# Patient Record
Sex: MALE | Race: WHITE | Employment: FULL TIME | URBAN - METROPOLITAN AREA
[De-identification: names, ages, dates, MRNs, and addresses within clinical notes are randomized per-mention and may not be internally consistent; named-entity substitution may affect disease eponyms.]

---
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
## 2017-02-24 RX ORDER — AMLODIPINE BESYLATE 10 MG/1
TABLET ORAL
Qty: 90 TAB | Refills: 3 | Status: SHIPPED | OUTPATIENT
Start: 2017-02-24 | End: 2018-03-04 | Stop reason: SDUPTHER

## 2017-04-07 RX ORDER — PEN NEEDLE, DIABETIC 31 GX5/16"
NEEDLE, DISPOSABLE MISCELLANEOUS
Qty: 200 PEN NEEDLE | Refills: 11 | Status: SHIPPED | OUTPATIENT
Start: 2017-04-07 | End: 2018-10-30 | Stop reason: ALTCHOICE

## 2017-04-17 ENCOUNTER — LAB ONLY (OUTPATIENT)
Dept: ENDOCRINOLOGY | Age: 47
End: 2017-04-17

## 2017-04-17 DIAGNOSIS — E55.9 VITAMIN D DEFICIENCY: ICD-10-CM

## 2017-04-17 DIAGNOSIS — E10.65 UNCONTROLLED TYPE 1 DIABETES MELLITUS WITH HYPERGLYCEMIA (HCC): Primary | ICD-10-CM

## 2017-04-17 DIAGNOSIS — E78.5 HYPERLIPIDEMIA LDL GOAL <70: ICD-10-CM

## 2017-04-18 LAB
25(OH)D3+25(OH)D2 SERPL-MCNC: 39.2 NG/ML (ref 30–100)
ALBUMIN SERPL-MCNC: 4.4 G/DL (ref 3.5–5.5)
ALBUMIN/GLOB SERPL: 1.6 {RATIO} (ref 1.2–2.2)
ALP SERPL-CCNC: 94 IU/L (ref 39–117)
ALT SERPL-CCNC: 14 IU/L (ref 0–44)
AST SERPL-CCNC: 18 IU/L (ref 0–40)
BILIRUB SERPL-MCNC: <0.2 MG/DL (ref 0–1.2)
BUN SERPL-MCNC: 27 MG/DL (ref 6–24)
BUN/CREAT SERPL: 25 (ref 9–20)
CALCIUM SERPL-MCNC: 9.1 MG/DL (ref 8.7–10.2)
CHLORIDE SERPL-SCNC: 98 MMOL/L (ref 96–106)
CHOLEST SERPL-MCNC: 204 MG/DL (ref 100–199)
CO2 SERPL-SCNC: 19 MMOL/L (ref 18–29)
CREAT SERPL-MCNC: 1.08 MG/DL (ref 0.76–1.27)
EST. AVERAGE GLUCOSE BLD GHB EST-MCNC: 192 MG/DL
GLOBULIN SER CALC-MCNC: 2.7 G/DL (ref 1.5–4.5)
GLUCOSE SERPL-MCNC: 204 MG/DL (ref 65–99)
HBA1C MFR BLD: 8.3 % (ref 4.8–5.6)
HDLC SERPL-MCNC: 36 MG/DL
INTERPRETATION, 910389: NORMAL
LDLC SERPL CALC-MCNC: 117 MG/DL (ref 0–99)
POTASSIUM SERPL-SCNC: 4.5 MMOL/L (ref 3.5–5.2)
PROT SERPL-MCNC: 7.1 G/DL (ref 6–8.5)
SODIUM SERPL-SCNC: 136 MMOL/L (ref 134–144)
TRIGL SERPL-MCNC: 253 MG/DL (ref 0–149)
VLDLC SERPL CALC-MCNC: 51 MG/DL (ref 5–40)

## 2017-04-21 ENCOUNTER — OFFICE VISIT (OUTPATIENT)
Dept: ENDOCRINOLOGY | Age: 47
End: 2017-04-21

## 2017-04-21 VITALS
DIASTOLIC BLOOD PRESSURE: 72 MMHG | HEART RATE: 96 BPM | HEIGHT: 64 IN | SYSTOLIC BLOOD PRESSURE: 142 MMHG | BODY MASS INDEX: 36.4 KG/M2 | WEIGHT: 213.2 LBS

## 2017-04-21 DIAGNOSIS — I10 ESSENTIAL HYPERTENSION, BENIGN: ICD-10-CM

## 2017-04-21 DIAGNOSIS — E78.5 HYPERLIPIDEMIA LDL GOAL <70: ICD-10-CM

## 2017-04-21 DIAGNOSIS — E10.65 UNCONTROLLED TYPE 1 DIABETES MELLITUS WITH HYPERGLYCEMIA (HCC): Primary | ICD-10-CM

## 2017-04-21 DIAGNOSIS — E55.9 VITAMIN D DEFICIENCY: ICD-10-CM

## 2017-04-21 NOTE — PROGRESS NOTES
Chief Complaint   Patient presents with    Diabetes     pcp and pharmacy confirmed     History of Present Illness: Corrinne Corning is a 48 y.o. male here for follow up of diabetes. Weight up 7 lbs since last visit in 11/16. After last visit, split the levemir to 40 units bid and after about a week went up to 45 bid and has stayed at this dose. Has stayed on the 9:30-6 shift so has been been able to eat dinner earlier. Checking sugars fasting and seeing 175-225 and before dinner is 105-120. Compliant with BP and lipid regimen. Has not been doing any walking and willing to start. Wife is now 2 years out from liver transplant and doing well. Current Outpatient Prescriptions   Medication Sig    insulin detemir (LEVEMIR FLEXTOUCH) 100 unit/mL (3 mL) inpn Inject 45 units twice daily. Increase as directed to max dose of 100 units per day--replaces lantus--Dose change 4/20/17--updated med list--did not send prescription to the pharmacy    BD INSULIN PEN NEEDLE UF MINI 31 gauge x 3/16\" ndle AS DIRECTED UP TO 5 TIMES DAILY FOR BOTH HUMALOG AND LANTUS    amLODIPine (NORVASC) 10 mg tablet TAKE 1 TABLET BY MOUTH DAILY    atorvastatin (LIPITOR) 40 mg tablet TAKE ONE (1) TABLET(S) DAILY    insulin lispro (HUMALOG KWIKPEN) 200 unit/mL (3 mL) inpn 20 Units by SubCUTAneous route three (3) times daily. + 4 units for every 50 mg/dl above 150 mg/dl. Max 100 units per day    hydrochlorothiazide (HYDRODIURIL) 25 mg tablet TAKE 1 TABLET BY MOUTH ONCE DAILY    lisinopril (PRINIVIL, ZESTRIL) 40 mg tablet TAKE 1 TABLET BY MOUTH ONCE DAILY    insulin syringe-needle U-100 1 mL 31 x 15/64\" syrg 1 Syringe by SubCUTAneous route five (5) times daily.  aspirin 81 mg chewable tablet Take 81 mg by mouth daily.  ONETOUCH ULTRA TEST strip Test up to 6 times daily. Dx: 250.03    ONE TOUCH DELICA 33 gauge misc Use as directed up to 6 times daily.   Dx: 250.03    cholecalciferol, vitamin D3, (VITAMIN D3) 2,000 unit Tab Take  by mouth daily.  MV-MN/FA/D3/LYCOPENE/LUT/COQ10 (DAILY MULTIVITAMIN PO) Take  by mouth.  cyanocobalamin (VITAMIN B-12) 500 mcg tablet Take 1,000 mcg by mouth daily. No current facility-administered medications for this visit. No Known Allergies     Review of Systems:  - Eyes: no blurry vision or double vision  - Cardiovascular: no chest pain  - Respiratory: no shortness of breath  - Musculoskeletal: no myalgias  - Neurological: no numbness/tingling in extremities    Physical Examination:  Blood pressure 143/72, pulse 96, height 5' 4\" (1.626 m), weight 213 lb 3.2 oz (96.7 kg). Repeat manually 142/72 in right arm.  - General: pleasant, no distress, good eye contact   - Neck: no carotid bruits  - Cardiovascular: regular, normal rate, nl s1 and s2, no m/r/g,   - Respiratory: clear bilaterally  - Integumentary: no edema,   - Psychiatric: normal mood and affect    Data Reviewed:   Component      Latest Ref Rng & Units 4/17/2017 4/17/2017 4/17/2017 4/17/2017          10:36 AM 10:36 AM 10:36 AM 10:36 AM   Glucose      65 - 99 mg/dL   204 (H)    BUN      6 - 24 mg/dL   27 (H)    Creatinine      0.76 - 1.27 mg/dL   1.08    GFR est non-AA      >59 mL/min/1.73   81    GFR est AA      >59 mL/min/1.73   93    BUN/Creatinine ratio      9 - 20   25 (H)    Sodium      134 - 144 mmol/L   136    Potassium      3.5 - 5.2 mmol/L   4.5    Chloride      96 - 106 mmol/L   98    CO2      18 - 29 mmol/L   19    Calcium      8.7 - 10.2 mg/dL   9.1    Protein, total      6.0 - 8.5 g/dL   7.1    Albumin      3.5 - 5.5 g/dL   4.4    GLOBULIN, TOTAL      1.5 - 4.5 g/dL   2.7    A-G Ratio      1.2 - 2.2   1.6    Bilirubin, total      0.0 - 1.2 mg/dL   <0.2    Alk.  phosphatase      39 - 117 IU/L   94    AST      0 - 40 IU/L   18    ALT      0 - 44 IU/L   14    Cholesterol, total      100 - 199 mg/dL  204 (H)     Triglyceride      0 - 149 mg/dL  253 (H)     HDL Cholesterol      >39 mg/dL  36 (L)     VLDL, calculated      5 - 40 mg/dL 51 (H)     LDL, calculated      0 - 99 mg/dL  117 (H)     Hemoglobin A1c, (calculated)      4.8 - 5.6 %    8.3 (H)   Estimated average glucose      mg/dL    192   VITAMIN D, 25-HYDROXY      30.0 - 100.0 ng/mL 39.2          Assessment/Plan:     1. DM w/o complication type I, uncontrolled (250.03) his most recent Hgb A1c was 8.3% in 4/17 down from 8.8% in 11/16 up from 8.3% in 6/16 down from 8.6% in 2/16 up from 8.3% in 9/15 down from 9.4% in 4/15 up from 9.3% in 1/14 down from 10.4% in 9/13 up from 10.1% in 5/13 up from 8.1% in 1/13 down from 8.7% in Oct up from 8.3% in May up from 7.7% in February 2012 up from 6.8% in November 2011 down from 9% in September. Overall control is improving with splitting the levemir and will try going up on night dose to get fasting sugars under 130.  - decrease levemir to 40 units before breakfast and increase night dose to 50 units  - cont humalog 20 units before each meal + 4 units for every 50 mg/dl above 150 mg/dl   - check bs up to 6 times per day due to fluctuating blood sugars  - foot exam done 6/16  - optho UTD 11/16  - microalbumin nl 2/12 and 5/12, up to 37 in 5/13, down to 23 in 6/16--repeat today  - check A1c and cmp prior to next visit     2. HTN (hypertension) (401. 9AF) his BP was just goal < 140/90. Will focus on weight loss. - cont norvasc 10 mg daily  - cont lisinopril 40 mg daily  - cont hctz 25 mg daily     3. Other and unspecified hyperlipidemia (272.4) Given DM and TIA, Goal LDL < 70, non-HDL < 100, and TG < 150. LDL 94 in November 2011 and 97 in February 2012. Up to 106 in May despite switching to lipitor but back to 97 in Oct 2012 and 65 in 1/13 but up to 131 in 5/13 due to diet. Had been off lipitor for 12 days in 9/13 and LDL was unable to be calculated due to TG of 565 and non-HDL of 228.  in 1/14. Couldn't be calculated in 4/15 due to high TG of 874 while off lipitor.  and  in 9/15.    and  in 2/16 and 175 and 231 and LDL 96 in 6/16.  and  in 11/16 due to being off lipitor for 1 week but down to TC of 204 and TG of 253 in 4/17.  - cont lipitor 40 mg daily  - check lipids prior to next visit     4. Vitamin D deficiency (268.9) His level was 17.2 in February and up to 29.9 in May and 39.4 in Oct 2012 and 45 in 1/13 and 36 in 5/13. Down to 25.6 in 9/13 but had been off vitamin D and up to 31.9 in 1/14. Currently off vitamin D and level 14.7 in 4/15 and 19.7 in 9/15. Back on since then and 43 in 2/16 and 41 in 6/16 and 39 in 4/17. Goal > 30.       - cont vitamin D 2000 units daily       - check Vitamin D 25-OH level in 4/18      Patient Instructions   1) Take 50 units of levemir at night and 40 units in the morning. Give this one week and if still having fasting sugars over 130, try 55 units at night and stay at 40 in the morning. My goal is to get your fasting sugars under 130 to get your A1c  to 7% or less. 2) Your cholesterol is 100 points lower. 3) Your BUN was just slightly high likely due to mild dehydration but your liver function was normal and vitamin D was normal.    4) Send me the name of your eye doctor and we'll track down your report. 5) Try to get back into walking 10-15 minutes a day 2-3 times a week and work up to 30 minutes 5 times a week. This does not have to be done altogether but can be split up throughout the day. 6) I will send you a reminder letter 3-4 weeks prior to your next visit and you will have the order already in the [facility] system so you can just go sometime in the 3-7 days before the next visit to have your labs drawn. Follow-up Disposition:  Return in about 5 months (around 9/21/2017). Copy sent to:  Dr. Surjit Brown [phone]    Lab follow up: 4/23/17  Component      Latest Ref Rng & Units 4/21/2017           4:37 PM   Creatinine, urine      Not Estab. mg/dL 102.5   Microalbumin, urine      Not Estab. ug/mL 48.3   Microalbumin/Creat.  Ratio 0.0 - 30.0 mg/g creat 47.1 (H)     Sent him the following message through backstitch:  Microalbumin/creatinine ratio is a marker of the amount of protein in your urine. Goal is less than 30. Your value is slightly abnormal. This indicates that your kidneys are being slightly affected by your uncontrolled diabetes and/or blood pressure. Hopefully with improving your A1c, we'll prevent your kidneys from getting any worse. Continue to take lisinopril to help protect your kidneys from the effects of diabetes and high blood pressure.

## 2017-04-21 NOTE — PATIENT INSTRUCTIONS
1) Take 50 units of levemir at night and 40 units in the morning. Give this one week and if still having fasting sugars over 130, try 55 units at night and stay at 40 in the morning. My goal is to get your fasting sugars under 130 to get your A1c  to 7% or less. 2) Your cholesterol is 100 points lower. 3) Your BUN was just slightly high likely due to mild dehydration but your liver function was normal and vitamin D was normal.    4) Send me the name of your eye doctor and we'll track down your report. 5) Try to get back into walking 10-15 minutes a day 2-3 times a week and work up to 30 minutes 5 times a week. This does not have to be done altogether but can be split up throughout the day. 6) I will send you a reminder letter 3-4 weeks prior to your next visit and you will have the order already in the [email or web address] system so you can just go sometime in the 3-7 days before the next visit to have your labs drawn.

## 2017-04-21 NOTE — MR AVS SNAPSHOT
Visit Information Date & Time Provider Department Dept. Phone Encounter #  
 4/21/2017  3:50 PM Chandra Cameron, [address] [place] [address] Diabetes and Endocrinology [phone] [id number] Follow-up Instructions Return in about 5 months (around 9/21/2017). Upcoming Health Maintenance Date Due Pneumococcal 19-64 Medium Risk (1 of 1 - PPSV23) 12/14/1987 DTaP/Tdap/Td series (1 - Tdap) 12/14/1989 EYE EXAM RETINAL OR DILATED Q1 2/1/2016 INFLUENZA AGE 9 TO ADULT 8/1/2016 MICROALBUMIN Q1 6/20/2017 FOOT EXAM Q1 6/24/2017 HEMOGLOBIN A1C Q6M 10/17/2017 LIPID PANEL Q1 4/17/2018 Allergies as of 4/21/2017  Review Complete On: 4/21/2017 By: Chandra Cameron MD  
 No Known Allergies Current Immunizations  Reviewed on 9/6/2011 No immunizations on file. Not reviewed this visit Vitals BP Pulse Height(growth percentile) Weight(growth percentile) BMI Smoking Status 143/72 (BP 1 Location: Left arm, BP Patient Position: Sitting) 96 5' 4\" (1.626 m) 213 lb 3.2 oz (96.7 kg) 36.6 kg/m2 Never Smoker Vitals History BMI and BSA Data Body Mass Index Body Surface Area  
 36.6 kg/m 2 2.09 m 2 Preferred Pharmacy Pharmacy Name Phone [facility], [address] [place] [address] AT [address] [place] [address] [phone] Your Updated Medication List  
  
   
This list is accurate as of: 4/21/17  4:14 PM.  Always use your most recent med list. amLODIPine 10 mg tablet Commonly known as:  Brigida Rafter TAKE 1 TABLET BY MOUTH DAILY  
  
 aspirin 81 mg chewable tablet Take 81 mg by mouth daily. atorvastatin 40 mg tablet Commonly known as:  LIPITOR  
TAKE ONE (1) TABLET(S) DAILY  
  
 BD INSULIN PEN NEEDLE UF MINI 31 gauge x 3/16\" Ndle Generic drug:  Insulin Needles (Disposable) AS DIRECTED UP TO 5 TIMES DAILY FOR BOTH HUMALOG AND LANTUS  
  
 DAILY MULTIVITAMIN PO Take  by mouth. hydroCHLOROthiazide 25 mg tablet Commonly known as:  HYDRODIURIL  
TAKE 1 TABLET BY MOUTH ONCE DAILY  
  
 insulin detemir 100 unit/mL (3 mL) Inpn Commonly known as:  Caron Severs Inject 40 units in the morning and 50 units at night. Increase as directed to max dose of 100 units per day--replaces lantus--Dose change 4/20/17--updated med list--did not send prescription to the pharmacy  
  
 insulin lispro 200 unit/mL (3 mL) Inpn Commonly known as:  HumaLOG KwikPen 20 Units by SubCUTAneous route three (3) times daily. + 4 units for every 50 mg/dl above 150 mg/dl. Max 100 units per day  
  
 insulin syringe-needle U-100 1 mL 31 gauge x 15/64\" Syrg 1 Syringe by SubCUTAneous route five (5) times daily. lisinopril 40 mg tablet Commonly known as:  PRINIVIL, ZESTRIL  
TAKE 1 TABLET BY MOUTH ONCE DAILY One Touch Delica 33 gauge Misc Generic drug:  lancets Use as directed up to 6 times daily. Dx: 250.03  
  
 ONETOUCH ULTRA TEST strip Generic drug:  glucose blood VI test strips Test up to 6 times daily. Dx: 250.03  
  
 VITAMIN B-12 500 mcg tablet Generic drug:  cyanocobalamin Take 1,000 mcg by mouth daily. VITAMIN D3 2,000 unit Tab Generic drug:  cholecalciferol (vitamin D3) Take  by mouth daily. Follow-up Instructions Return in about 5 months (around 9/21/2017). Patient Instructions 1) Take 50 units of levemir at night and 40 units in the morning. Give this one week and if still having fasting sugars over 130, try 55 units at night and stay at 40 in the morning. My goal is to get your fasting sugars under 130 to get your A1c  to 7% or less. 2) Your cholesterol is 100 points lower. 3) Your BUN was just slightly high likely due to mild dehydration but your liver function was normal and vitamin D was normal. 
 
4) Send me the name of your eye doctor and we'll track down your report. 5) Try to get back into walking 10-15 minutes a day 2-3 times a week and work up to 30 minutes 5 times a week. This does not have to be done altogether but can be split up throughout the day. 6) I will send you a reminder letter 3-4 weeks prior to your next visit and you will have the order already in the [facility] system so you can just go sometime in the 3-7 days before the next visit to have your labs drawn. Introducing [facility]! Dear Melanie Worthington: 
Thank you for requesting a [employer] account. Our records indicate that you already have an active In[employer] account. You can access your account anytime at [email or web address] Did you know that you can access your hospital and ER discharge instructions at any time in [employer]? You can also review all of your test results from your hospital stay or ER visit. Additional Information If you have questions, please visit the Frequently Asked Questions section of the Innot[employer] website at [email or web address] Remember, In[employer] is NOT to be used for urgent needs. For medical emergencies, dial 911. Now available from your iPhone and Android! Please provide this summary of care documentation to your next provider. Your primary care clinician is listed as Timothy Aleman. If you have any questions after today's visit, please call [phone].

## 2017-04-22 LAB
ALBUMIN/CREAT UR: 47.1 MG/G CREAT (ref 0–30)
CREAT UR-MCNC: 102.5 MG/DL
MICROALBUMIN UR-MCNC: 48.3 UG/ML

## 2017-06-02 RX ORDER — HYDROCHLOROTHIAZIDE 25 MG/1
TABLET ORAL
Qty: 90 TAB | Refills: 3 | Status: SHIPPED | OUTPATIENT
Start: 2017-06-02 | End: 2018-03-08 | Stop reason: SDUPTHER

## 2017-06-02 RX ORDER — LISINOPRIL 40 MG/1
TABLET ORAL
Qty: 90 TAB | Refills: 3 | Status: SHIPPED | OUTPATIENT
Start: 2017-06-02 | End: 2018-03-08 | Stop reason: SDUPTHER

## 2017-08-30 DIAGNOSIS — E10.65 UNCONTROLLED TYPE 1 DIABETES MELLITUS WITH HYPERGLYCEMIA (HCC): ICD-10-CM

## 2017-08-30 DIAGNOSIS — E55.9 VITAMIN D DEFICIENCY: ICD-10-CM

## 2017-08-30 DIAGNOSIS — E78.5 HYPERLIPIDEMIA LDL GOAL <70: ICD-10-CM

## 2017-08-30 DIAGNOSIS — I10 ESSENTIAL HYPERTENSION, BENIGN: ICD-10-CM

## 2017-09-12 LAB
ALBUMIN SERPL-MCNC: 4.8 G/DL (ref 3.5–5.5)
ALBUMIN/GLOB SERPL: 1.9 {RATIO} (ref 1.2–2.2)
ALP SERPL-CCNC: 102 IU/L (ref 39–117)
ALT SERPL-CCNC: 24 IU/L (ref 0–44)
AST SERPL-CCNC: 25 IU/L (ref 0–40)
BILIRUB SERPL-MCNC: 0.5 MG/DL (ref 0–1.2)
BUN SERPL-MCNC: 21 MG/DL (ref 6–24)
BUN/CREAT SERPL: 18 (ref 9–20)
CALCIUM SERPL-MCNC: 9.6 MG/DL (ref 8.7–10.2)
CHLORIDE SERPL-SCNC: 99 MMOL/L (ref 96–106)
CHOLEST SERPL-MCNC: 188 MG/DL (ref 100–199)
CO2 SERPL-SCNC: 24 MMOL/L (ref 18–29)
CREAT SERPL-MCNC: 1.16 MG/DL (ref 0.76–1.27)
EST. AVERAGE GLUCOSE BLD GHB EST-MCNC: 166 MG/DL
GLOBULIN SER CALC-MCNC: 2.5 G/DL (ref 1.5–4.5)
GLUCOSE SERPL-MCNC: 124 MG/DL (ref 65–99)
HBA1C MFR BLD: 7.4 % (ref 4.8–5.6)
HDLC SERPL-MCNC: 37 MG/DL
LDLC SERPL CALC-MCNC: 112 MG/DL (ref 0–99)
POTASSIUM SERPL-SCNC: 5.4 MMOL/L (ref 3.5–5.2)
PROT SERPL-MCNC: 7.3 G/DL (ref 6–8.5)
SODIUM SERPL-SCNC: 138 MMOL/L (ref 134–144)
TRIGL SERPL-MCNC: 195 MG/DL (ref 0–149)
VLDLC SERPL CALC-MCNC: 39 MG/DL (ref 5–40)

## 2017-09-25 ENCOUNTER — OFFICE VISIT (OUTPATIENT)
Dept: ENDOCRINOLOGY | Age: 47
End: 2017-09-25

## 2017-09-25 VITALS
HEART RATE: 91 BPM | SYSTOLIC BLOOD PRESSURE: 156 MMHG | BODY MASS INDEX: 36.33 KG/M2 | HEIGHT: 64 IN | WEIGHT: 212.8 LBS | DIASTOLIC BLOOD PRESSURE: 78 MMHG

## 2017-09-25 DIAGNOSIS — E78.5 HYPERLIPIDEMIA LDL GOAL <70: ICD-10-CM

## 2017-09-25 DIAGNOSIS — I10 ESSENTIAL HYPERTENSION, BENIGN: ICD-10-CM

## 2017-09-25 DIAGNOSIS — E10.65 UNCONTROLLED TYPE 1 DIABETES MELLITUS WITH HYPERGLYCEMIA (HCC): Primary | ICD-10-CM

## 2017-09-25 DIAGNOSIS — E55.9 VITAMIN D DEFICIENCY: ICD-10-CM

## 2017-09-25 NOTE — MR AVS SNAPSHOT
Visit Information Date & Time Provider Department Dept. Phone Encounter #  
 9/25/2017  4:10 PM Tamika Brown, [address] [facility] Diabetes and Endocrinology [phone] [id number] Follow-up Instructions Return in about 6 months (around 3/25/2018). Upcoming Health Maintenance Date Due Pneumococcal 19-64 Medium Risk (1 of 1 - PPSV23) 12/14/1987 DTaP/Tdap/Td series (1 - Tdap) 12/14/1989 EYE EXAM RETINAL OR DILATED Q1 2/1/2016 FOOT EXAM Q1 6/24/2017 INFLUENZA AGE 9 TO ADULT 8/1/2017 HEMOGLOBIN A1C Q6M 3/11/2018 MICROALBUMIN Q1 4/21/2018 LIPID PANEL Q1 9/11/2018 Allergies as of 9/25/2017  Review Complete On: 9/25/2017 By: Tamika Brown MD  
 No Known Allergies Current Immunizations  Reviewed on 9/6/2011 No immunizations on file. Not reviewed this visit You Were Diagnosed With   
  
 Codes Comments Uncontrolled type 1 diabetes mellitus with hyperglycemia (HCC)    -  Primary ICD-10-CM: E10.65 ICD-9-CM: 250.83, 790.29 Essential hypertension, benign     ICD-10-CM: I10 
ICD-9-CM: 401.1 Hyperlipidemia LDL goal <70     ICD-10-CM: E78.5 ICD-9-CM: 272.4 Vitamin D deficiency     ICD-10-CM: E55.9 ICD-9-CM: 268.9 Vitals BP Pulse Height(growth percentile) Weight(growth percentile) BMI Smoking Status 156/78 91 5' 4\" (1.626 m) 212 lb 12.8 oz (96.5 kg) 36.53 kg/m2 Never Smoker Vitals History BMI and BSA Data Body Mass Index Body Surface Area  
 36.53 kg/m 2 2.09 m 2 Preferred Pharmacy Pharmacy Name Phone [facility], [address] [facility] [address] [phone] Your Updated Medication List  
  
   
This list is accurate as of: 9/25/17  4:59 PM.  Always use your most recent med list. amLODIPine 10 mg tablet Commonly known as:  Jasmin Grebe TAKE 1 TABLET BY MOUTH DAILY  
  
 aspirin 81 mg chewable tablet Take 81 mg by mouth daily. atorvastatin 40 mg tablet Commonly known as:  LIPITOR  
TAKE ONE (1) TABLET(S) DAILY  
  
 BD INSULIN PEN NEEDLE UF MINI 31 gauge x 3/16\" Ndle Generic drug:  Insulin Needles (Disposable) AS DIRECTED UP TO 5 TIMES DAILY FOR BOTH HUMALOG AND LANTUS  
  
 DAILY MULTIVITAMIN PO Take  by mouth. hydroCHLOROthiazide 25 mg tablet Commonly known as:  HYDRODIURIL  
TAKE 1 TABLET BY MOUTH EVERY DAY  
  
 insulin detemir 100 unit/mL (3 mL) Inpn Commonly known as:  Sergo Clark Inject 46 units in the morning and 50 units at night. Increase as directed to max dose of 100 units per day--Dose change 9/25/17--updated med list--did not send prescription to the pharmacy  
  
 insulin lispro 200 unit/mL (3 mL) Inpn Commonly known as:  HumaLOG KwikPen 10 Units by SubCUTAneous route three (3) times daily. + 2 units for every 50 mg/dl above 150 mg/dl. Max 100 units per day--Dose change 9/25/17--updated med list--did not send prescription to the pharmacy  
  
 insulin syringe-needle U-100 1 mL 31 gauge x 15/64\" Syrg 1 Syringe by SubCUTAneous route five (5) times daily. lisinopril 40 mg tablet Commonly known as:  PRINIVIL, ZESTRIL  
TAKE 1 TABLET BY MOUTH EVERY DAY One Touch Delica 33 gauge Misc Generic drug:  lancets Use as directed up to 6 times daily. Dx: 250.03  
  
 ONETOUCH ULTRA TEST strip Generic drug:  glucose blood VI test strips Test up to 6 times daily. Dx: 250.03  
  
 VITAMIN B-12 500 mcg tablet Generic drug:  cyanocobalamin Take 1,000 mcg by mouth daily. VITAMIN D3 2,000 unit Tab Generic drug:  cholecalciferol (vitamin D3) Take  by mouth daily. Follow-up Instructions Return in about 6 months (around 3/25/2018). To-Do List   
 02/25/2018 Lab:  HEMOGLOBIN A1C WITH EAG   
  
 02/25/2018 Lab:  LIPID PANEL   
  
 02/25/2018 Lab:  METABOLIC PANEL, COMPREHENSIVE   
  
 02/25/2018 Lab: MICROALBUMIN, UR, RAND W/ MICROALBUMIN/CREA RATIO   
  
 02/25/2018 Lab:  VITAMIN D, 25 HYDROXY Patient Instructions 1) Dose your humalog as listed below: 
Blood sugar Breakfast/Lunch/Dinner Bedtime (or when not eating) Less than 90 Eat first, take 8    ---- 
   10       ---- 
151-200  12        Take 2 only 201-250  14       Take 4 only 251-300  16       Take 6 only 301-350  18       Take 8 only 351-400  20       Take 10 only 401-450  22       Take 12 only 2) Decrease your levemir to 46 units in the morning and 50 units at night. If you are still having low sugars under 80 during the day when you are not eating, then further decrease the morning dose by 4 units as needed. 3) Start monitoring blood pressure about 2-3 times per week at alternating times either in the morning or evening after resting for 5 minutes and sitting upright in a chair with your arm at heart level. Please let me know if you are having readings over 140 on the top number or 90 on the bottom number. 4) Try to shoot for 30 grams of carbs or less per meal.  If you eat a larger meal with 45-60 grams like going out to eat, then plan on using the old scale for humalog. 5) Your cholesterol has come down with better diabetes control. Introducing [facility] & [facility]! Dear Marty Ortiz: 
Thank you for requesting a [employer] account. Our records indicate that you already have an active [employer] account. You can access your account anytime at [email or web address] Did you know that you can access your hospital and ER discharge instructions at any time in [employer]? You can also review all of your test results from your hospital stay or ER visit. Additional Information If you have questions, please visit the Frequently Asked Questions section of the [employer] website at [email or web address] Remember, [employer] is NOT to be used for urgent needs.  For medical emergencies, dial 911. Now available from your iPhone and Android! Please provide this summary of care documentation to your next provider. Your primary care clinician is listed as Timothy Aleman. If you have any questions after today's visit, please call [phone].

## 2017-09-25 NOTE — PROGRESS NOTES
Chief Complaint   Patient presents with    Diabetes     pcp and pharmacy confirmed    Diabetic Foot Exam     due    Other     consent form signed for eye report     History of Present Illness: David Curry is a 48 y.o. male here for follow up of diabetes. Weight down 1 lbs since last visit in 4/17. Has increased the morning dose of levemir up to 50 units and stayed at 50 at night and this helped eliminate the afternoon higher sugars. About 3 weeks ago, started cutting back on carbs to eat more protein and veggies but will still have fruit as his carb and this had allowed his fasting sugars to improve to  and can be  during the day. Has had some readings under 70 with not eating as many carbs but still following the same scale for the humalog. Compliant with BP and lipid regimen. Hasn't checked his BP at home but is willing to start doing so. Has had more arthritis pain in his hands recently. Current Outpatient Prescriptions   Medication Sig    insulin detemir (LEVEMIR FLEXTOUCH) 100 unit/mL (3 mL) inpn Inject 50 units in the morning and 50 units at night. Increase as directed to max dose of 100 units per day--Dose change 9/25/17--updated med list--did not send prescription to the pharmacy    insulin lispro (HUMALOG KWIKPEN) 200 unit/mL (3 mL) inpn 20 Units by SubCUTAneous route three (3) times daily. + 4 units for every 50 mg/dl above 150 mg/dl. Max 100 units per day    hydroCHLOROthiazide (HYDRODIURIL) 25 mg tablet TAKE 1 TABLET BY MOUTH EVERY DAY    lisinopril (PRINIVIL, ZESTRIL) 40 mg tablet TAKE 1 TABLET BY MOUTH EVERY DAY    BD INSULIN PEN NEEDLE UF MINI 31 gauge x 3/16\" ndle AS DIRECTED UP TO 5 TIMES DAILY FOR BOTH HUMALOG AND LANTUS    amLODIPine (NORVASC) 10 mg tablet TAKE 1 TABLET BY MOUTH DAILY    atorvastatin (LIPITOR) 40 mg tablet TAKE ONE (1) TABLET(S) DAILY    insulin syringe-needle U-100 1 mL 31 x 15/64\" syrg 1 Syringe by SubCUTAneous route five (5) times daily.     aspirin 81 mg chewable tablet Take 81 mg by mouth daily.  ONETOUCH ULTRA TEST strip Test up to 6 times daily. Dx: 250.03    ONE TOUCH DELICA 33 gauge misc Use as directed up to 6 times daily. Dx: 250.03    cholecalciferol, vitamin D3, (VITAMIN D3) 2,000 unit Tab Take  by mouth daily.  MV-MN/FA/D3/LYCOPENE/LUT/COQ10 (DAILY MULTIVITAMIN PO) Take  by mouth.  cyanocobalamin (VITAMIN B-12) 500 mcg tablet Take 1,000 mcg by mouth daily. No current facility-administered medications for this visit. No Known Allergies     Review of Systems:  - Eyes: no blurry vision or double vision  - Cardiovascular: no chest pain  - Respiratory: no shortness of breath  - Musculoskeletal: no myalgias  - Neurological: no numbness/tingling in extremities    Physical Examination:  Blood pressure 156/78, pulse 91, height 5' 4\" (1.626 m), weight 212 lb 12.8 oz (96.5 kg).   - General: pleasant, no distress, good eye contact   - Neck: no carotid bruits  - Cardiovascular: regular, normal rate, nl s1 and s2, no m/r/g,   - Respiratory: clear bilaterally  - Integumentary: no edema,   - Psychiatric: normal mood and affect         Diabetic foot exam performed by Karuna Freed MD     Measurement  Response Nurse Comment Physician Comment   Monofilament  R - reduced sensation with micro filament  L - reduced sensation with micro filament     Pulse DP R - 2+ (normal)  L - 2+ (normal)     Pulse TP R -  slightly decreased    L -  slightly decreased       Structural deformity R - None  L - None     Skin Integrity / Deformity R - Mild - callus  L - Mild - callus        Reviewed by:                 Data Reviewed: Component      Latest Ref Rng & Units 9/11/2017 9/11/2017 9/11/2017           8:55 AM  8:55 AM  8:55 AM   Glucose      65 - 99 mg/dL  124 (H)    BUN      6 - 24 mg/dL  21    Creatinine      0.76 - 1.27 mg/dL  1.16    GFR est non-AA      >59 mL/min/1.73  74    GFR est AA      >59 mL/min/1.73  86    BUN/Creatinine ratio      9 - 20  18    Sodium      134 - 144 mmol/L  138    Potassium      3.5 - 5.2 mmol/L  5.4 (H)    Chloride      96 - 106 mmol/L  99    CO2      18 - 29 mmol/L  24    Calcium      8.7 - 10.2 mg/dL  9.6    Protein, total      6.0 - 8.5 g/dL  7.3    Albumin      3.5 - 5.5 g/dL  4.8    GLOBULIN, TOTAL      1.5 - 4.5 g/dL  2.5    A-G Ratio      1.2 - 2.2  1.9    Bilirubin, total      0.0 - 1.2 mg/dL  0.5    Alk. phosphatase      39 - 117 IU/L  102    AST      0 - 40 IU/L  25    ALT (SGPT)      0 - 44 IU/L  24    Cholesterol, total      100 - 199 mg/dL 188     Triglyceride      0 - 149 mg/dL 195 (H)     HDL Cholesterol      >39 mg/dL 37 (L)     VLDL, calculated      5 - 40 mg/dL 39     LDL, calculated      0 - 99 mg/dL 112 (H)     Hemoglobin A1c, (calculated)      4.8 - 5.6 %   7.4 (H)   Estimated average glucose      mg/dL   166       Assessment/Plan:     1. DM w/o complication type I, uncontrolled (250.03) his most recent Hgb A1c was 7.4% in 9/17 down from 8.3% in 4/17 down from 8.8% in 11/16 up from 8.3% in 6/16 down from 8.6% in 2/16 up from 8.3% in 9/15 down from 9.4% in 4/15 up from 9.3% in 1/14 down from 10.4% in 9/13 up from 10.1% in 5/13 up from 8.1% in 1/13 down from 8.7% in Oct up from 8.3% in May up from 7.7% in February 2012 up from 6.8% in November 2011 down from 9% in September. A1c is the best it's been in 4 years so praised him for this. Will slightly decrease morning levemir to avoid lows now that he is eating less carbs and make his humalog scale less aggressive  - decrease levemir to 46 units before breakfast and cont night dose of 50 units  - decrease humalog to 10 units before each meal + 2 units for every 50 mg/dl above 150 mg/dl   - check bs up to 6 times per day due to fluctuating blood sugars  - foot exam done 9/17  - optho UTD 11/16  - microalbumin nl 2/12 and 5/12, up to 37 in 5/13, down to 23 in 6/16, up to 47 in 4/17  - check A1c and cmp and microalbumin prior to next visit     2.  HTN (hypertension) (401. 9AF) his BP was above goal < 140/90. Will focus on weight loss. - cont norvasc 10 mg daily  - cont lisinopril 40 mg daily  - cont hctz 25 mg daily  - monitor home readings       3. Other and unspecified hyperlipidemia (272.4) Given DM and TIA, Goal LDL < 70, non-HDL < 100, and TG < 150. LDL 94 in November 2011 and 97 in February 2012. Up to 106 in May despite switching to lipitor but back to 97 in Oct 2012 and 65 in 1/13 but up to 131 in 5/13 due to diet. Had been off lipitor for 12 days in 9/13 and LDL was unable to be calculated due to TG of 565 and non-HDL of 228.  in 1/14. Couldn't be calculated in 4/15 due to high TG of 874 while off lipitor.  and  in 9/15.  and  in 2/16 and 175 and 231 and LDL 96 in 6/16.  and  in 11/16 due to being off lipitor for 1 week but down to TC of 204 and TG of 253 in 4/17.  and  in 9/17  - cont lipitor 40 mg daily  - check lipids prior to next visit     4. Vitamin D deficiency (268.9) His level was 17.2 in February and up to 29.9 in May and 39.4 in Oct 2012 and 45 in 1/13 and 36 in 5/13. Down to 25.6 in 9/13 but had been off vitamin D and up to 31.9 in 1/14. Currently off vitamin D and level 14.7 in 4/15 and 19.7 in 9/15. Back on since then and 43 in 2/16 and 41 in 6/16 and 39 in 4/17. Goal > 30.       - cont vitamin D 2000 units daily       - check Vitamin D 25-OH level prior to next visit          Patient Instructions   1) Dose your humalog as listed below:  Blood sugar Breakfast/Lunch/Dinner Bedtime (or when not eating)  Less than 90 Eat first, take 8    ----     10       ----  151-200  12        Take 2 only   201-250  14       Take 4 only  251-300  16       Take 6 only  301-350  18       Take 8 only  351-400  20       Take 10 only  401-450  22       Take 12 only    2) Decrease your levemir to 46 units in the morning and 50 units at night.   If you are still having low sugars under 80 during the day when you are not eating, then further decrease the morning dose by 4 units as needed. 3) Start monitoring blood pressure about 2-3 times per week at alternating times either in the morning or evening after resting for 5 minutes and sitting upright in a chair with your arm at heart level. Please let me know if you are having readings over 140 on the top number or 90 on the bottom number. 4) Try to shoot for 30 grams of carbs or less per meal.  If you eat a larger meal with 45-60 grams like going out to eat, then plan on using the old scale for humalog. 5) Your cholesterol has come down with better diabetes control. Follow-up Disposition:  Return in about 6 months (around 3/25/2018).     Copy sent to:  Dr. Ester Loja [phone]

## 2017-09-25 NOTE — PATIENT INSTRUCTIONS
1) Dose your humalog as listed below:  Blood sugar Breakfast/Lunch/Dinner Bedtime (or when not eating)  Less than 90 Eat first, take 8    ----     10       ----  151-200  12        Take 2 only   201-250  14       Take 4 only  251-300  16       Take 6 only  301-350  18       Take 8 only  351-400  20       Take 10 only  401-450  22       Take 12 only    2) Decrease your levemir to 46 units in the morning and 50 units at night. If you are still having low sugars under 80 during the day when you are not eating, then further decrease the morning dose by 4 units as needed. 3) Start monitoring blood pressure about 2-3 times per week at alternating times either in the morning or evening after resting for 5 minutes and sitting upright in a chair with your arm at heart level. Please let me know if you are having readings over 140 on the top number or 90 on the bottom number. 4) Try to shoot for 30 grams of carbs or less per meal.  If you eat a larger meal with 45-60 grams like going out to eat, then plan on using the old scale for humalog. 5) Your cholesterol has come down with better diabetes control.

## 2017-11-14 ENCOUNTER — TELEPHONE (OUTPATIENT)
Dept: ENDOCRINOLOGY | Age: 47
End: 2017-11-14

## 2017-11-14 NOTE — TELEPHONE ENCOUNTER
----- Message from Beena Silver sent at 11/13/2017  5:14 PM EST -----  Regarding: Dr. Jeanette Mixon, with [facility].  [phone] Fax [phone] is requesting clarification on the application for medication assistance for the Humalog Quick Pen. There was no dosage listed.

## 2017-11-14 NOTE — TELEPHONE ENCOUNTER
Per Gemma Cox with [employer] they do not have the front application with the humalog sig written on it. He asked that it be refaxed. He also stated that they need for Ms. Ranjana Sanders to submit a document showing proof that he does not have insurance. I left a message for patient to call.

## 2017-11-15 ENCOUNTER — TELEPHONE (OUTPATIENT)
Dept: ENDOCRINOLOGY | Age: 47
End: 2017-11-15

## 2017-11-16 NOTE — TELEPHONE ENCOUNTER
We had the following e-mail exchange:    [id number] Lisa Dalton I printed a prescription for you to  and have the savings card with it. We'll see if this works.  ===View-only below this line===      ----- Message -----     From: Leandra Sicard     Sent: 11/16/2017  3:23 AM EST       To: Gauri Mcgregor MD  Subject: Prescription Question    Dr Lavern Vazquez  I need a written prescription for Humalog so I can pick it up. The other route I was trying to take denied me. I am going to see if I can use the $25 discount card from [employer]. The levemir was approved so they will be shipping the insulin to your office so I can pick it up. 4 months worth. They   will also be sending a refill notice to your office too. Thanks for all your help with this and let me know when the prescription is   ready to be picked up.

## 2017-11-19 ENCOUNTER — PATIENT MESSAGE (OUTPATIENT)
Dept: ENDOCRINOLOGY | Age: 47
End: 2017-11-19

## 2017-11-20 NOTE — TELEPHONE ENCOUNTER
From: Jo Ann Langley  To: Oliva Juarez MD  Sent: 11/19/2017 12:40 PM EST  Subject: Prescription Question    Well the saving card only would save me $100 so Humalog would still cost me $1100 for three months. I don't have that kind of money. The pharmacist said I could get novolin R at [facility] for $25 a vial over the counter. Is it alright for me to switch back to that until something else comes along? I guess I will need a prescription for that if it is okay to take. Let me know.  Thanks

## 2017-11-27 ENCOUNTER — TELEPHONE (OUTPATIENT)
Dept: ENDOCRINOLOGY | Age: 47
End: 2017-11-27

## 2017-11-27 RX ORDER — INSULIN ASPART 100 [IU]/ML
INJECTION, SOLUTION INTRAVENOUS; SUBCUTANEOUS
Qty: 12 VIAL | Refills: 3 | Status: SHIPPED | OUTPATIENT
Start: 2017-11-27 | End: 2018-10-30 | Stop reason: ALTCHOICE

## 2017-11-27 NOTE — TELEPHONE ENCOUNTER
Please call [employer] patient assistance [phone] and ask if it's possible to add novolog vials onto his already approved application for levemir. Does he need a prescription faxed or can a verbal order be taken over the phone? I have printed a prescription to be faxed but if verbal can be taken over the phone, it can be read directly from the prescription.

## 2017-11-27 NOTE — TELEPHONE ENCOUNTER
Per Jeancarlos Ran with [employer] a form will need to be filled out and faxed back. He stated that we could call back in 24-48 hours to see if it was approved and it would then take 5-7 business days for it to be shipped out.

## 2017-12-07 ENCOUNTER — TELEPHONE (OUTPATIENT)
Dept: ENDOCRINOLOGY | Age: 47
End: 2017-12-07

## 2017-12-07 NOTE — TELEPHONE ENCOUNTER
Sent him the following message through [employer]:    I understand your novolog arrived today. I'm pretty sure my nurse who was covering for [facility] (named Bhupinder Ellsi) called you today to let you know but if not, please come pick this up at your convenience. Take care.

## 2018-02-22 ENCOUNTER — TELEPHONE (OUTPATIENT)
Dept: ENDOCRINOLOGY | Age: 48
End: 2018-02-22

## 2018-03-04 RX ORDER — AMLODIPINE BESYLATE 10 MG/1
TABLET ORAL
Qty: 90 TAB | Refills: 3 | Status: SHIPPED | OUTPATIENT
Start: 2018-03-04 | End: 2018-03-05 | Stop reason: SDUPTHER

## 2018-03-05 RX ORDER — ATORVASTATIN CALCIUM 40 MG/1
TABLET, FILM COATED ORAL
Qty: 90 TAB | Refills: 3 | Status: SHIPPED | OUTPATIENT
Start: 2018-03-05 | End: 2018-03-08 | Stop reason: SDUPTHER

## 2018-03-05 RX ORDER — AMLODIPINE BESYLATE 10 MG/1
TABLET ORAL
Qty: 90 TAB | Refills: 3 | Status: SHIPPED | OUTPATIENT
Start: 2018-03-05 | End: 2018-03-08 | Stop reason: SDUPTHER

## 2018-03-05 NOTE — TELEPHONE ENCOUNTER
From: Corrinne Griffins  To: Gabrielle Stanley MD  Sent: 3/4/2018 4:07 PM EST  Subject: Medication Renewal Request    Original authorizing provider: Lorren Pia, MD Corrinne Griffins would like a refill of the following medications:  atorvastatin (LIPITOR) 40 mg tablet Gabrielle Stanley MD]    Preferred pharmacy: [address] [place] [address], [address] [place] [address] AT [address]:

## 2018-03-05 NOTE — TELEPHONE ENCOUNTER
From: Garcia Ball  To: Mirain Siddiqui MD  Sent: 3/4/2018 4:10 PM EST  Subject: Medication Renewal Request    Original authorizing provider: MD Garcia Keller would like a refill of the following medications:  amLODIPine (NORVASC) 10 mg tablet Mirian Siddiqui MD]    Preferred pharmacy: [facility], [address] [place] [address] AT [address]:

## 2018-03-08 RX ORDER — HYDROCHLOROTHIAZIDE 25 MG/1
TABLET ORAL
Qty: 90 TAB | Refills: 3 | Status: SHIPPED | OUTPATIENT
Start: 2018-03-08 | End: 2018-10-30 | Stop reason: SDUPTHER

## 2018-03-08 RX ORDER — AMLODIPINE BESYLATE 10 MG/1
TABLET ORAL
Qty: 90 TAB | Refills: 3 | Status: SHIPPED | OUTPATIENT
Start: 2018-03-08 | End: 2018-10-30 | Stop reason: SDUPTHER

## 2018-03-08 RX ORDER — ATORVASTATIN CALCIUM 40 MG/1
TABLET, FILM COATED ORAL
Qty: 90 TAB | Refills: 3 | Status: SHIPPED | OUTPATIENT
Start: 2018-03-08 | End: 2018-10-30 | Stop reason: ALTCHOICE

## 2018-03-08 RX ORDER — LISINOPRIL 40 MG/1
TABLET ORAL
Qty: 90 TAB | Refills: 3 | Status: SHIPPED | OUTPATIENT
Start: 2018-03-08 | End: 2018-10-30 | Stop reason: SDUPTHER

## 2018-03-08 NOTE — TELEPHONE ENCOUNTER
----- Message from Lawrence Warner sent at 3/8/2018  1:19 PM EST -----  Regarding: Prescription Question  Contact: [phone]  Dr Martinez Henderson rx said they sent you guys refills for the pills that I take. I just wanted to let you know to look out for them. They wouldn't let me get them at [facility] without paying full price for them so this is my only option.  Thanks

## 2018-03-22 ENCOUNTER — TELEPHONE (OUTPATIENT)
Dept: ENDOCRINOLOGY | Age: 48
End: 2018-03-22

## 2018-03-22 DIAGNOSIS — E78.5 HYPERLIPIDEMIA LDL GOAL <70: ICD-10-CM

## 2018-03-22 DIAGNOSIS — I10 ESSENTIAL HYPERTENSION, BENIGN: ICD-10-CM

## 2018-03-22 DIAGNOSIS — E55.9 VITAMIN D DEFICIENCY: ICD-10-CM

## 2018-03-22 DIAGNOSIS — E10.65 UNCONTROLLED TYPE 1 DIABETES MELLITUS WITH HYPERGLYCEMIA (HCC): Primary | ICD-10-CM

## 2018-03-22 NOTE — TELEPHONE ENCOUNTER
----- Message from Aure Barrett sent at 3/22/2018  4:07 PM EDT -----  Regarding: Non-Urgent Medical Question  Contact: [phone]  Dr Mayte De La Rosa can you send Perry Jasmine a copy of my lab order for next week. I will be doing labs for him too and he doesn't want to duplicate the labs. His fax number is [phone] in case you need it.  Thanks

## 2018-03-29 LAB
CREATININE, EXTERNAL: 1
HBA1C MFR BLD HPLC: 8.1 %
LDL-C, EXTERNAL: 129
MICROALBUMIN UR TEST STR-MCNC: 148 MG/DL

## 2018-03-30 ENCOUNTER — OFFICE VISIT (OUTPATIENT)
Dept: ENDOCRINOLOGY | Age: 48
End: 2018-03-30

## 2018-03-30 VITALS
HEIGHT: 64 IN | DIASTOLIC BLOOD PRESSURE: 80 MMHG | WEIGHT: 203.2 LBS | SYSTOLIC BLOOD PRESSURE: 142 MMHG | HEART RATE: 92 BPM | BODY MASS INDEX: 34.69 KG/M2

## 2018-03-30 DIAGNOSIS — E78.5 HYPERLIPIDEMIA LDL GOAL <70: ICD-10-CM

## 2018-03-30 DIAGNOSIS — I10 ESSENTIAL HYPERTENSION, BENIGN: ICD-10-CM

## 2018-03-30 DIAGNOSIS — E55.9 VITAMIN D DEFICIENCY: ICD-10-CM

## 2018-03-30 DIAGNOSIS — E10.65 UNCONTROLLED TYPE 1 DIABETES MELLITUS WITH HYPERGLYCEMIA (HCC): Primary | ICD-10-CM

## 2018-03-30 PROBLEM — E11.21 TYPE 2 DIABETES WITH NEPHROPATHY (HCC): Status: ACTIVE | Noted: 2018-03-30

## 2018-03-30 NOTE — PROGRESS NOTES
Chief Complaint   Patient presents with    Diabetes     pcp and pharmacy confirmed     History of Present Illness: Maria De Jesus Scott is a 50 y.o. male here for follow up of diabetes. Weight down 9 lbs since last visit in 9/17. Went back up in the morning to 50 units of levemir and still taking 50 units and is not having low sugars on a regular basis during the day. Still following scale for novolog. Started working 2 jobs in 10/17. Goes to bed around 6pm and wakes up at 1am so his fasting sugars can vary between  but most are around 180. Tends to eat dinner right before going to bed. Sugars are around 120 at 5pm and then eats dinner. Did get the flu and bronchitis for most of the month of February and his sugars were higher that month. Compliant with BP and lipid regimen. Current Outpatient Prescriptions   Medication Sig    insulin detemir U-100 (LEVEMIR FLEXTOUCH U-100 INSULN) 100 unit/mL (3 mL) inpn Inject 50 units in the morning and 50 units at night. Increase as directed to max dose of 100 units per day--Dose change 3/30/18--updated med list--did not send prescription to the pharmacy    amLODIPine (NORVASC) 10 mg tablet TAKE 1 TABLET BY MOUTH DAILY    lisinopril (PRINIVIL, ZESTRIL) 40 mg tablet TAKE 1 TABLET BY MOUTH EVERY DAY    atorvastatin (LIPITOR) 40 mg tablet TAKE ONE (1) TABLET(S) DAILY    hydroCHLOROthiazide (HYDRODIURIL) 25 mg tablet TAKE 1 TABLET BY MOUTH EVERY DAY    NOVOLOG 100 unit/mL injection 10 Units by SubCUTAneous route three (3) times daily. + 2 units for every 50 mg/dl above 150 mg/dl. Max 100 units per day    BD INSULIN PEN NEEDLE UF MINI 31 gauge x 3/16\" ndle AS DIRECTED UP TO 5 TIMES DAILY FOR BOTH HUMALOG AND LANTUS    insulin syringe-needle U-100 1 mL 31 x 15/64\" syrg 1 Syringe by SubCUTAneous route five (5) times daily.  aspirin 81 mg chewable tablet Take 81 mg by mouth daily.  ONETOUCH ULTRA TEST strip Test up to 6 times daily.   Dx: 250.03   [facility] DELICA 33 gauge misc Use as directed up to 6 times daily. Dx: 250.03    cholecalciferol, vitamin D3, (VITAMIN D3) 2,000 unit Tab Take  by mouth daily.  MV-MN/FA/D3/LYCOPENE/LUT/COQ10 (DAILY MULTIVITAMIN PO) Take  by mouth.  cyanocobalamin (VITAMIN B-12) 500 mcg tablet Take 1,000 mcg by mouth daily. No current facility-administered medications for this visit. No Known Allergies     Review of Systems:  - Eyes: no blurry vision or double vision  - Cardiovascular: no chest pain  - Respiratory: no shortness of breath  - Musculoskeletal: no myalgias  - Neurological: occ numbness/tingling in extremities    Physical Examination:  Blood pressure 142/80, pulse 92, height 5' 4\" (1.626 m), weight 203 lb 3.2 oz (92.2 kg). - General: pleasant, no distress, good eye contact   - Neck: no carotid bruits  - Cardiovascular: regular, normal rate, nl s1 and s2, no m/r/g,   - Respiratory: clear bilaterally  - Integumentary: no edema,   - Psychiatric: normal mood and affect    Data Reviewed:   - Hgb A1c 8.1%  - lipids: total 219 ,  HDL 43, ,   - ALT 17, AST 29  - BUN/Cr 25/1.00  - microalbumin 148  - vitamin D 33      Assessment/Plan:     1. DM w/o complication type I, uncontrolled (250.03) his most recent Hgb A1c was 8.1% in 3/18 up from 7.4% in 9/17 down from 8.3% in 4/17 down from 8.8% in 11/16 up from 8.3% in 6/16 down from 8.6% in 2/16 up from 8.3% in 9/15 down from 9.4% in 4/15 up from 9.3% in 1/14 down from 10.4% in 9/13 up from 10.1% in 5/13 up from 8.1% in 1/13 down from 8.7% in Oct up from 8.3% in May up from 7.7% in February 2012 up from 6.8% in November 2011 down from 9% in September. A1c is up a little bit due to flu in 2/18 so will keep his doses the same and discussed sick day management.   - cont levemir 50 units before breakfast and 50 units at night  - cont novolog 10 units before each meal + 2 units for every 50 mg/dl above 150 mg/dl   - check bs up to 6 times per day due to fluctuating blood sugars  - foot exam done 9/17  - optho UTD 11/16  - microalbumin nl 2/12 and 5/12, up to 37 in 5/13, down to 23 in 6/16, up to 47 in 4/17 and 148 in 3/18  - check A1c and cmp prior to next visit (send to [address])       2. HTN (hypertension) (401. 9AF) his BP was just above goal < 140/90. Will focus on weight loss. - cont norvasc 10 mg daily  - cont lisinopril 40 mg daily  - cont hctz 25 mg daily  - monitor home readings       3. Other and unspecified hyperlipidemia (272.4) Given DM and TIA, Goal LDL < 70, non-HDL < 100, and TG < 150. LDL 94 in November 2011 and 97 in February 2012. Up to 106 in May despite switching to lipitor but back to 97 in Oct 2012 and 65 in 1/13 but up to 131 in 5/13 due to diet. Had been off lipitor for 12 days in 9/13 and LDL was unable to be calculated due to TG of 565 and non-HDL of 228.  in 1/14. Couldn't be calculated in 4/15 due to high TG of 874 while off lipitor.  and  in 9/15.  and  in 2/16 and 175 and 231 and LDL 96 in 6/16.  and  in 11/16 due to being off lipitor for 1 week but down to TC of 204 and TG of 253 in 4/17.  and  in 9/17.  and  in 3/18  - cont lipitor 40 mg daily  - check lipids prior to next visit     4. Vitamin D deficiency (268.9) His level was 17.2 in February and up to 29.9 in May and 39.4 in Oct 2012 and 45 in 1/13 and 36 in 5/13. Down to 25.6 in 9/13 but had been off vitamin D and up to 31.9 in 1/14. Currently off vitamin D and level 14.7 in 4/15 and 19.7 in 9/15. Back on since then and 43 in 2/16 and 41 in 6/16 and 39 in 4/17 and 33 in 3/18. Goal > 30.       - cont vitamin D 2000 units daily       - check Vitamin D 25-OH level in 3/19      Patient Instructions   1) In the future when you are sick or on antibiotics, plan on increasing your levemir to 60 units twice daily for the duration of the illness and then go back to 50 twice daily.     2) Your A1c is likely slightly higher due to the infection in 2/18. 3) Your cholesterol looks good. Your blood pressure is almost at goal.        Follow-up Disposition:  Return in about 6 months (around 9/30/2018).     Copy sent to:  Dr. Amanda Ferraro [phone]

## 2018-03-30 NOTE — MR AVS SNAPSHOT
[address] [id number] [phone] Patient: Cyndi Plunkett MRN: [id number] :1970 Visit Information Date & Time Provider Department Dept. Phone Encounter #  
 3/30/2018  4:10 PM Tristan Wilson, [address] [place] [address] Diabetes and Endocrinology [phone] [id number] Follow-up Instructions Return in about 6 months (around 2018). Upcoming Health Maintenance Date Due Pneumococcal 19-64 Medium Risk (1 of 1 - PPSV23) 1987 DTaP/Tdap/Td series (1 - Tdap) 1989 Influenza Age 5 to Adult 2017 EYE EXAM RETINAL OR DILATED Q1 11/10/2017 HEMOGLOBIN A1C Q6M 3/11/2018 MICROALBUMIN Q1 2018 LIPID PANEL Q1 2018 FOOT EXAM Q1 2018 Allergies as of 3/30/2018  Review Complete On: 3/30/2018 By: Tristan Wilson MD  
 Not on File Current Immunizations  Reviewed on 2011 No immunizations on file. Not reviewed this visit You Were Diagnosed With   
  
 Codes Comments Uncontrolled type 1 diabetes mellitus with hyperglycemia (HCC)    -  Primary ICD-10-CM: E10.65 ICD-9-CM: 250.83, 790.29 Essential hypertension, benign     ICD-10-CM: I10 
ICD-9-CM: 401.1 Hyperlipidemia LDL goal <70     ICD-10-CM: E78.5 ICD-9-CM: 272.4 Vitamin D deficiency     ICD-10-CM: E55.9 ICD-9-CM: 268.9 Vitals BP Pulse Height(growth percentile) Weight(growth percentile) BMI Smoking Status 142/80 92 5' 4\" (1.626 m) 203 lb 3.2 oz (92.2 kg) 34.88 kg/m2 Never Smoker Vitals History BMI and BSA Data Body Mass Index Body Surface Area 34.88 kg/m 2 2.04 m 2 Preferred Pharmacy Pharmacy Name Phone [address] [facility], [address] [address] [place] [address] [address] Yahir Forrester 134 Your Updated Medication List  
  
   
This list is accurate as of 3/30/18  5:13 PM.  Always use your most recent med list.  
  
  
  
  
 amLODIPine 10 mg tablet Commonly known as:  Alvarado Boast TAKE 1 TABLET BY MOUTH DAILY  
  
 aspirin 81 mg chewable tablet Take 81 mg by mouth daily. atorvastatin 40 mg tablet Commonly known as:  LIPITOR  
TAKE ONE (1) TABLET(S) DAILY  
  
 BD INSULIN PEN NEEDLE UF MINI 31 gauge x 3/16\" Ndle Generic drug:  Insulin Needles (Disposable) AS DIRECTED UP TO 5 TIMES DAILY FOR BOTH HUMALOG AND LANTUS  
  
 DAILY MULTIVITAMIN PO Take  by mouth. hydroCHLOROthiazide 25 mg tablet Commonly known as:  HYDRODIURIL  
TAKE 1 TABLET BY MOUTH EVERY DAY  
  
 insulin detemir U-100 100 unit/mL (3 mL) Inpn Commonly known as:  LEVEMIR FLEXTOUCH U-100 INSULN Inject 50 units in the morning and 50 units at night. Increase as directed to max dose of 100 units per day--Dose change 3/30/18--updated med list--did not send prescription to the pharmacy  
  
 insulin syringe-needle U-100 1 mL 31 gauge x 15/64\" Syrg 1 Syringe by SubCUTAneous route five (5) times daily. lisinopril 40 mg tablet Commonly known as:  PRINIVIL, ZESTRIL  
TAKE 1 TABLET BY MOUTH EVERY DAY NovoLOG U-100 Insulin aspart 100 unit/mL injection Generic drug:  insulin aspart U-100  
10 Units by SubCUTAneous route three (3) times daily. + 2 units for every 50 mg/dl above 150 mg/dl. Max 100 units per day One Touch Delica 33 gauge Misc Generic drug:  lancets Use as directed up to 6 times daily. Dx: 250.03  
  
 ONETOUCH ULTRA TEST strip Generic drug:  glucose blood VI test strips Test up to 6 times daily. Dx: 250.03  
  
 VITAMIN B-12 500 mcg tablet Generic drug:  cyanocobalamin Take 1,000 mcg by mouth daily. VITAMIN D3 2,000 unit Tab Generic drug:  cholecalciferol (vitamin D3) Take  by mouth daily. We Performed the Following HEMOGLOBIN A1C WITH EAG [22313 CPT(R)] LIPID PANEL [52178 CPT(R)] METABOLIC PANEL, COMPREHENSIVE [51784 CPT(R)] Follow-up Instructions Return in about 6 months (around 9/30/2018). Patient Instructions 1) In the future when you are sick or on antibiotics, plan on increasing your levemir to 60 units twice daily for the duration of the illness and then go back to 50 twice daily. 2) Your A1c is likely slightly higher due to the infection in 2/18. 3) Your cholesterol looks good. Your blood pressure is almost at goal. 
 
 
 
 
  
Introducing [facility] SERVICES! Dear Amberly Hyde: 
Thank you for requesting a mywaves account. Our records indicate that you already have an active mywaves account. You can access your account anytime at [email or web address] Did you know that you can access your hospital and ER discharge instructions at any time in mywaves? You can also review all of your test results from your hospital stay or ER visit. Additional Information If you have questions, please visit the Frequently Asked Questions section of the mywaves website at [email or web address] Remember, mywaves is NOT to be used for urgent needs. For medical emergencies, dial 911. Now available from your iPhone and Android! Please provide this summary of care documentation to your next provider. Your primary care clinician is listed as Timothy Aleman. If you have any questions after today's visit, please call [phone].

## 2018-06-07 ENCOUNTER — TELEPHONE (OUTPATIENT)
Dept: ENDOCRINOLOGY | Age: 48
End: 2018-06-07

## 2018-06-07 NOTE — TELEPHONE ENCOUNTER
Patient's Levemir and Novolog vials have arrived from [facility] as 12 vials each.

## 2018-07-02 RX ORDER — SYRINGE AND NEEDLE,INSULIN,1ML 31GX15/64"
1 SYRINGE, EMPTY DISPOSABLE MISCELLANEOUS
Qty: 200 PEN NEEDLE | Refills: 11 | Status: SHIPPED | OUTPATIENT
Start: 2018-07-02

## 2018-07-03 NOTE — TELEPHONE ENCOUNTER
From: Phong Pacheco  To: Emory Manjarrez MD  Sent: 7/2/2018 3:26 PM EDT  Subject: Medication Renewal Request    Original authorizing provider: MD Phong Ochoa would like a refill of the following medications:  insulin syringe-needle U-100 1 mL 31 x 15/64\" syrg Emory Manjarrez MD]    Preferred pharmacy: [facility], [address] [place] [address] AT [address]:

## 2018-10-12 ENCOUNTER — TELEPHONE (OUTPATIENT)
Dept: ENDOCRINOLOGY | Age: 48
End: 2018-10-12

## 2018-10-12 NOTE — TELEPHONE ENCOUNTER
Please call him to schedule a fasting lab appointment prior to his visit as his insurance now requires [facility].  Thanks.

## 2018-10-16 ENCOUNTER — LAB ONLY (OUTPATIENT)
Dept: ENDOCRINOLOGY | Age: 48
End: 2018-10-16

## 2018-10-16 DIAGNOSIS — E11.21 TYPE 2 DIABETES WITH NEPHROPATHY (HCC): Primary | ICD-10-CM

## 2018-10-16 DIAGNOSIS — E78.5 HYPERLIPIDEMIA LDL GOAL <70: ICD-10-CM

## 2018-10-17 LAB
ALB/GLOBRATIO, 58C: 1.8 (CALC) (ref 1–2.5)
ALBUMIN SERPL-MCNC: 4.6 G/DL (ref 3.6–5.1)
ALP SERPL-CCNC: 106 U/L (ref 40–115)
ALT SERPL-CCNC: 13 U/L (ref 9–46)
AST SERPL W P-5'-P-CCNC: 19 U/L (ref 10–40)
BILIRUB SERPL-MCNC: 0.4 MG/DL (ref 0.2–1.2)
BUN SERPL-MCNC: 24 MG/DL (ref 7–25)
BUN/CREATININE RATIO,BUCR: ABNORMAL (CALC) (ref 6–22)
CALCIUM SERPL-MCNC: 9.6 MG/DL (ref 8.6–10.3)
CHLORIDE SERPL-SCNC: 98 MMOL/L (ref 98–110)
CHOL/HDL RATIO,CHHDX: 6 (CALC)
CHOLEST SERPL-MCNC: 197 MG/DL
CO2 SERPL-SCNC: 28 MMOL/L (ref 20–32)
CREAT SERPL-MCNC: 1.16 MG/DL (ref 0.6–1.35)
EAG (MG/DL),9916804: 174 (CALC)
EAG (MMOL/L),9916805: 9.7 (CALC)
GLOBULIN,GLOB: 2.5 G/DL (CALC) (ref 1.9–3.7)
GLUCOSE SERPL-MCNC: 235 MG/DL (ref 65–99)
HBA1C MFR BLD HPLC: 7.7 % OF TOTAL HGB
HDLC SERPL-MCNC: 33 MG/DL
LDL-CHOLESTEROL: 120 MG/DL (CALC)
NON-HDL CHOLESTEROL, 011976: 164 MG/DL (CALC)
POTASSIUM SERPL-SCNC: 5 MMOL/L (ref 3.5–5.3)
PROT SERPL-MCNC: 7.1 G/DL (ref 6.1–8.1)
SODIUM SERPL-SCNC: 135 MMOL/L (ref 135–146)
TRIGL SERPL-MCNC: 283 MG/DL (ref ?–150)

## 2018-10-30 ENCOUNTER — OFFICE VISIT (OUTPATIENT)
Dept: ENDOCRINOLOGY | Age: 48
End: 2018-10-30

## 2018-10-30 VITALS
BODY MASS INDEX: 35.94 KG/M2 | HEART RATE: 94 BPM | SYSTOLIC BLOOD PRESSURE: 153 MMHG | DIASTOLIC BLOOD PRESSURE: 77 MMHG | HEIGHT: 64 IN | WEIGHT: 210.5 LBS

## 2018-10-30 DIAGNOSIS — E78.5 HYPERLIPIDEMIA LDL GOAL <70: ICD-10-CM

## 2018-10-30 DIAGNOSIS — I10 ESSENTIAL HYPERTENSION, BENIGN: ICD-10-CM

## 2018-10-30 DIAGNOSIS — E10.65 UNCONTROLLED TYPE 1 DIABETES MELLITUS WITH HYPERGLYCEMIA (HCC): Primary | ICD-10-CM

## 2018-10-30 DIAGNOSIS — E55.9 VITAMIN D DEFICIENCY: ICD-10-CM

## 2018-10-30 PROBLEM — E66.01 SEVERE OBESITY (HCC): Status: ACTIVE | Noted: 2018-10-30

## 2018-10-30 RX ORDER — HYDROCHLOROTHIAZIDE 25 MG/1
TABLET ORAL
Qty: 90 TAB | Refills: 3 | Status: SHIPPED | OUTPATIENT
Start: 2018-10-30 | End: 2019-11-07 | Stop reason: SDUPTHER

## 2018-10-30 RX ORDER — INSULIN DEGLUDEC INJECTION 200 U/ML
INJECTION, SOLUTION SUBCUTANEOUS
Qty: 54 ML | Refills: 3 | Status: SHIPPED | OUTPATIENT
Start: 2018-10-30 | End: 2019-11-02 | Stop reason: SDUPTHER

## 2018-10-30 RX ORDER — PEN NEEDLE, DIABETIC 31 GX3/16"
NEEDLE, DISPOSABLE MISCELLANEOUS
Qty: 300 PEN NEEDLE | Refills: 3 | Status: SHIPPED | OUTPATIENT
Start: 2018-10-30 | End: 2020-12-01 | Stop reason: SDUPTHER

## 2018-10-30 RX ORDER — LISINOPRIL 40 MG/1
TABLET ORAL
Qty: 90 TAB | Refills: 3 | Status: SHIPPED | OUTPATIENT
Start: 2018-10-30 | End: 2019-11-02 | Stop reason: SDUPTHER

## 2018-10-30 RX ORDER — INSULIN ASPART 100 [IU]/ML
INJECTION, SOLUTION INTRAVENOUS; SUBCUTANEOUS
Qty: 90 ML | Refills: 3 | Status: SHIPPED | OUTPATIENT
Start: 2018-10-30 | End: 2019-11-02 | Stop reason: SDUPTHER

## 2018-10-30 RX ORDER — ROSUVASTATIN CALCIUM 20 MG/1
20 TABLET, COATED ORAL
Qty: 90 TAB | Refills: 3 | Status: SHIPPED | OUTPATIENT
Start: 2018-10-30 | End: 2019-01-17

## 2018-10-30 RX ORDER — AMLODIPINE BESYLATE 10 MG/1
TABLET ORAL
Qty: 90 TAB | Refills: 3 | Status: SHIPPED | OUTPATIENT
Start: 2018-10-30 | End: 2019-11-02 | Stop reason: SDUPTHER

## 2018-10-30 NOTE — PROGRESS NOTES
Chief Complaint   Patient presents with    Diabetes     pcp and pharmacy confirmed    Other     eye exam is due     History of Present Illness: Akshat Palacios is a 50 y.o. male here for follow up of diabetes. Weight up 7 lbs since last visit in 3/18. Still working 2 jobs and takes a dose at [employer] and then can't get his next dose until close to 4pm and tends to notice his sugars are rising by the time of the next dose of levemir. Has increased morning dose of levemir to 60 units and still taking 50 units at night. His insurance changed to [facility] and he would like to get back on the pens. His fasting sugars are usually around 110 but can rise over 200 in the afternoon prior to the evening meal.  Only eating 2 meals at this time. Compliant with BP regimen. Current Outpatient Medications   Medication Sig    insulin syringe-needle U-100 1 mL 31 gauge x 15/64\" syrg 1 Syringe by SubCUTAneous route five (5) times daily.  insulin detemir U-100 (LEVEMIR FLEXTOUCH U-100 INSULN) 100 unit/mL (3 mL) inpn Inject 50 units in the morning and 50 units at night. Increase as directed to max dose of 100 units per day--Dose change 3/30/18--updated med list--did not send prescription to the pharmacy    amLODIPine (NORVASC) 10 mg tablet TAKE 1 TABLET BY MOUTH DAILY    lisinopril (PRINIVIL, ZESTRIL) 40 mg tablet TAKE 1 TABLET BY MOUTH EVERY DAY    atorvastatin (LIPITOR) 40 mg tablet TAKE ONE (1) TABLET(S) DAILY    hydroCHLOROthiazide (HYDRODIURIL) 25 mg tablet TAKE 1 TABLET BY MOUTH EVERY DAY    NOVOLOG 100 unit/mL injection 10 Units by SubCUTAneous route three (3) times daily. + 2 units for every 50 mg/dl above 150 mg/dl. Max 100 units per day    BD INSULIN PEN NEEDLE UF MINI 31 gauge x 3/16\" ndle AS DIRECTED UP TO 5 TIMES DAILY FOR BOTH HUMALOG AND LANTUS    aspirin 81 mg chewable tablet Take 81 mg by mouth daily.  ONETOUCH ULTRA TEST strip Test up to 6 times daily.   Dx: 250.03    ONE TOUCH DELICA 33 gauge misc Use as directed up to 6 times daily. Dx: 250.03    cholecalciferol, vitamin D3, (VITAMIN D3) 2,000 unit Tab Take  by mouth daily.  MV-MN/FA/D3/LYCOPENE/LUT/COQ10 (DAILY MULTIVITAMIN PO) Take  by mouth.  cyanocobalamin (VITAMIN B-12) 500 mcg tablet Take 1,000 mcg by mouth daily. No current facility-administered medications for this visit. No Known Allergies     Review of Systems:  - Eyes: no blurry vision or double vision  - Cardiovascular: no chest pain  - Respiratory: no shortness of breath  - Musculoskeletal: no myalgias  - Neurological: no numbness/tingling in extremities    Physical Examination:  Blood pressure 153/77, pulse 94, height 5' 4\" (1.626 m), weight 210 lb 8 oz (95.5 kg). - General: pleasant, no distress, good eye contact   - Neck: no carotid bruits  - Cardiovascular: regular, normal rate, nl s1 and s2, no m/r/g,   - Respiratory: clear bilaterally  - Integumentary: no edema,   - Psychiatric: normal mood and affect    Data Reviewed:   Component      Latest Ref Rng & Units 10/16/2018 10/16/2018 10/16/2018          12:37 PM 12:37 PM 12:37 PM   Glucose      65 - 99 mg/dL  235 (H)    BUN      7 - 25 mg/dL  24    Creatinine      0.60 - 1.35 mg/dL  1.16    GFR est non-AA      > OR = 60 mL/min/1.73m2  74    GFR est AA      > OR = 60 mL/min/1.73m2  85    BUN/Creatinine ratio      6 - 22 (calc)  NOT APPLICABLE    Sodium      135 - 146 mmol/L  135    Potassium      3.5 - 5.3 mmol/L  5.0    Chloride      98 - 110 mmol/L  98    CO2      20 - 32 mmol/L  28    Calcium      8.6 - 10.3 mg/dL  9.6    Protein, total      6.1 - 8.1 g/dL  7.1    Albumin      3.6 - 5.1 g/dL  4.6    Globulin      1.9 - 3.7 g/dL (calc)  2.5    ALB/GLOBRATIO      1.0 - 2.5 (calc)  1.8    Bilirubin, total      0.2 - 1.2 mg/dL  0.4    Alk.  phosphatase      40 - 115 U/L  106    AST      10 - 40 U/L  19    ALT (SGPT)      9 - 46 U/L  13    Cholesterol, total      <200 mg/dL 197     HDL Cholesterol      >40 mg/dL 33 (L) Triglyceride      <150 mg/dL 283 (H)     LDL-CHOLESTEROL      mg/dL (calc) 120 (H)     Cholesterol/HDL ratio      <5.0 (calc) 6.0 (H)     Non-HDL Cholesterol      <130 mg/dL (calc) 164 (H)     Hemoglobin A1c, (calculated)      <5.7 % of total Hgb   7.7 (H)   eAG (mg/dL)      (calc)   174   eAG (mmol/L)      (calc)   9.7       Assessment/Plan:     1. DM w/o complication type I, uncontrolled (250.03) his most recent Hgb A1c was 7.7% in 10/18 down from 8.1% in 3/18 up from 7.4% in 9/17 down from 8.3% in 4/17 down from 8.8% in 11/16 up from 8.3% in 6/16 down from 8.6% in 2/16 up from 8.3% in 9/15 down from 9.4% in 4/15 up from 9.3% in 1/14 down from 10.4% in 9/13 up from 10.1% in 5/13 up from 8.1% in 1/13 down from 8.7% in Oct up from 8.3% in May up from 7.7% in February 2012 up from 6.8% in November 2011 down from 9% in September. A1c is coming down but will change to tresiba for better basal insulin coverage  - stop levemir 50 units before breakfast and 50 units at night  - begin tresiba 110 units in the morning  - cont novolog 10 units before each meal + 2 units for every 50 mg/dl above 150 mg/dl   - check bs up to 6 times per day due to fluctuating blood sugars  - foot exam done 7/18 by podiatry  [facility]  - microalbumin nl 2/12 and 5/12, up to 37 in 5/13, down to 23 in 6/16, up to 47 in 4/17 and 148 in 3/18  - check A1c and cmp and microalbumin prior to next visit (send to [address] [place])       2. HTN (hypertension) (401. 9AF) his BP was above goal < 140/90. Will focus on weight loss. - cont norvasc 10 mg daily  - cont lisinopril 40 mg daily  - cont hctz 25 mg daily  - monitor home readings       3. Other and unspecified hyperlipidemia (272.4) Given DM and TIA, Goal LDL < 70, non-HDL < 100, and TG < 150. LDL 94 in November 2011 and 97 in February 2012. Up to 106 in May despite switching to lipitor but back to 97 in Oct 2012 and 65 in 1/13 but up to 131 in 5/13 due to diet.   Had been off lipitor for 12 days in 9/13 and LDL was unable to be calculated due to TG of 565 and non-HDL of 228.  in 1/14. Couldn't be calculated in 4/15 due to high TG of 874 while off lipitor.  and  in 9/15.  and  in 2/16 and 175 and 231 and LDL 96 in 6/16.  and  in 11/16 due to being off lipitor for 1 week but down to TC of 204 and TG of 253 in 4/17.  and  in 9/17.  and  in 3/18.  and  in 10/18 so will stop lipitor and change to crestor 20 mg   - begin crestor 20 mg at night  - stop lipitor 40 mg daily  - check lipids prior to next visit     4. Vitamin D deficiency (268.9) His level was 17.2 in February and up to 29.9 in May and 39.4 in Oct 2012 and 45 in 1/13 and 36 in 5/13. Down to 25.6 in 9/13 but had been off vitamin D and up to 31.9 in 1/14. Currently off vitamin D and level 14.7 in 4/15 and 19.7 in 9/15. Back on since then and 43 in 2/16 and 41 in 6/16 and 39 in 4/17 and 33 in 3/18. Goal > 30.       - cont vitamin D 2000 units daily       - check Vitamin D 25-OH level prior to next visit      Patient Instructions   1) Your LDL (bad cholesterol) is 120 and goal is still to get this under 100 as close to 70 or less as possible. Let's stop the atorvastatin 40 mg tabs when the new prescription for rosuvastatin (generic for crestor) arrives. You will take one 20 mg tab at night. 2) Your A1c has dropped from 8.1% to 7.7%    3) We will use tresiba in a pen to replace the levemir as your long acting insulin. Try taking 110 units at 1am and see if this helps keep your sugar under 130 at 1am and if so, this is a good dose. If you are dropping under 90 without eating, this is too much and try cutting back to 100 units daily. If you are rising over 150 consistently without eating, try 120 units in the morning. 4) Your BUN and creatinine are markers of kidney function. Your values are normal.    5) Your ALT and AST are markers of liver function. Your values are normal.    6) I will send novolog pens and pen needles to [facility]. Follow-up Disposition:  Return in about 6 months (around 4/30/2019) for fasting labs--[facility].     Copy sent to:  Dr. Coates Goes [phone]

## 2018-10-30 NOTE — PATIENT INSTRUCTIONS
1) Your LDL (bad cholesterol) is 120 and goal is still to get this under 100 as close to 70 or less as possible. Let's stop the atorvastatin 40 mg tabs when the new prescription for rosuvastatin (generic for crestor) arrives. You will take one 20 mg tab at night. 2) Your A1c has dropped from 8.1% to 7.7%    3) We will use tresiba in a pen to replace the levemir as your long acting insulin. Try taking 110 units at 1am and see if this helps keep your sugar under 130 at 1am and if so, this is a good dose. If you are dropping under 90 without eating, this is too much and try cutting back to 100 units daily. If you are rising over 150 consistently without eating, try 120 units in the morning. 4) Your BUN and creatinine are markers of kidney function. Your values are normal.    5) Your ALT and AST are markers of liver function. Your values are normal.    6) I will send novolog pens and pen needles to [facility].

## 2019-01-16 ENCOUNTER — TELEPHONE (OUTPATIENT)
Dept: ENDOCRINOLOGY | Age: 49
End: 2019-01-16

## 2019-01-17 RX ORDER — ROSUVASTATIN CALCIUM 20 MG/1
TABLET, COATED ORAL
Qty: 90 TAB | Refills: 3
Start: 2019-01-17 | End: 2020-12-01 | Stop reason: SDUPTHER

## 2019-01-17 NOTE — TELEPHONE ENCOUNTER
Please call Dr. Matias Vee office to confirm receipt of my last office note that was electronically faxed tonight. If not received, please manually fax.

## 2019-01-17 NOTE — TELEPHONE ENCOUNTER
Called Dr. Talon Dye office this morning and spoke to [place]. They had not received your last office note, so I faxed it manually. Confirmed receipt with Lay when I called back.

## 2019-04-23 ENCOUNTER — LAB ONLY (OUTPATIENT)
Dept: ENDOCRINOLOGY | Age: 49
End: 2019-04-23

## 2019-04-23 DIAGNOSIS — E78.5 HYPERLIPIDEMIA LDL GOAL <70: ICD-10-CM

## 2019-04-23 DIAGNOSIS — E10.65 UNCONTROLLED TYPE 1 DIABETES MELLITUS WITH HYPERGLYCEMIA (HCC): Primary | ICD-10-CM

## 2019-04-24 LAB
25(OH)D3 SERPL-MCNC: 36 NG/ML (ref 30–100)
ALB/GLOBRATIO, 58C: 1.7 (CALC) (ref 1–2.5)
ALBUMIN SERPL-MCNC: 4.5 G/DL (ref 3.6–5.1)
ALP SERPL-CCNC: 93 U/L (ref 40–115)
ALT SERPL-CCNC: 14 U/L (ref 9–46)
AST SERPL W P-5'-P-CCNC: 19 U/L (ref 10–35)
BILIRUB SERPL-MCNC: 0.3 MG/DL (ref 0.2–1.2)
BUN SERPL-MCNC: 30 MG/DL (ref 7–25)
BUN/CREATININE RATIO,BUCR: 26 (CALC) (ref 6–22)
CALCIUM SERPL-MCNC: 9.9 MG/DL (ref 8.6–10.3)
CHLORIDE SERPL-SCNC: 97 MMOL/L (ref 98–110)
CHOL/HDL RATIO,CHHDX: 6.1 (CALC)
CHOLEST SERPL-MCNC: 218 MG/DL
CO2 SERPL-SCNC: 27 MMOL/L (ref 20–32)
CREAT SERPL-MCNC: 1.16 MG/DL (ref 0.7–1.33)
EAG (MG/DL),9916804: 237 (CALC)
EAG (MMOL/L),9916805: 13.2 (CALC)
GLOBULIN,GLOB: 2.6 G/DL (CALC) (ref 1.9–3.7)
GLUCOSE SERPL-MCNC: 284 MG/DL (ref 65–99)
HBA1C MFR BLD HPLC: 9.9 % OF TOTAL HGB
HDLC SERPL-MCNC: 36 MG/DL
LDL-CHOLESTEROL: 141 MG/DL (CALC)
NON-HDL CHOLESTEROL, 011976: 182 MG/DL (CALC)
POTASSIUM SERPL-SCNC: 5.3 MMOL/L (ref 3.5–5.3)
PROT SERPL-MCNC: 7.1 G/DL (ref 6.1–8.1)
SODIUM SERPL-SCNC: 132 MMOL/L (ref 135–146)
TRIGL SERPL-MCNC: 264 MG/DL (ref ?–150)

## 2019-04-30 ENCOUNTER — OFFICE VISIT (OUTPATIENT)
Dept: ENDOCRINOLOGY | Age: 49
End: 2019-04-30

## 2019-04-30 VITALS
HEART RATE: 96 BPM | DIASTOLIC BLOOD PRESSURE: 74 MMHG | SYSTOLIC BLOOD PRESSURE: 141 MMHG | BODY MASS INDEX: 36.6 KG/M2 | WEIGHT: 214.4 LBS | HEIGHT: 64 IN

## 2019-04-30 DIAGNOSIS — E55.9 VITAMIN D DEFICIENCY: ICD-10-CM

## 2019-04-30 DIAGNOSIS — E10.65 UNCONTROLLED TYPE 1 DIABETES MELLITUS WITH HYPERGLYCEMIA (HCC): Primary | ICD-10-CM

## 2019-04-30 DIAGNOSIS — I10 ESSENTIAL HYPERTENSION, BENIGN: ICD-10-CM

## 2019-04-30 DIAGNOSIS — E78.5 HYPERLIPIDEMIA LDL GOAL <70: ICD-10-CM

## 2019-04-30 NOTE — PATIENT INSTRUCTIONS
1) Starting tomorrow take 110 units of tresiba and see if this helps keep your sugar under 130 at 1am and if so, this is a good dose. If you are dropping under 90 without eating, this is too much and try cutting back to 100 units daily. If you are rising over 150 consistently without eating, try 120 units in the morning. 2) Your A1c was higher but hopefully with going back on the tresiba, your value will come back down. 3) Your cholesterol was higher due to the higher A1c.

## 2019-04-30 NOTE — PROGRESS NOTES
Chief Complaint   Patient presents with    Diabetes     pcp and pharmacy confirmed     History of Present Illness: Cami Barrera is a 46 y.o. male here for follow up of diabetes. Weight up 4 lbs since last visit in 10/18. Tried to start the keto diet after last visit and was having a lot of lows so he cut his tresiba down to 84 units daily and then got off track with the diet with the holidays and never increased his tresiba back up and started having more readings over 200. Intermittently had to fast for a colonoscopy and then for a cataract surgery and during these times, his blood sugar was better but mostly has been running high. Has been taking crestor but contacted me in 1/19 that he was having more myalgias so advised to taking 1/2 tabs daily and has been on this dose. Compliant with BP regimen and states his BP has been under 140 at other locations recently. Current Outpatient Medications   Medication Sig    rosuvastatin (CRESTOR) 20 mg tablet Take 1/2 tab daily--Dose change 1/17/19--updated med list--did not send prescription to the pharmacy    TRESIBA FLEXTOUCH U-200 200 unit/mL (3 mL) inpn Inject 110-120 units in the morning--dispense 18 pens for 90 day supply    insulin aspart U-100 (NOVOLOG FLEXPEN U-100 INSULIN) 100 unit/mL inpn 10 Units by SubCUTAneous route three (3) times daily. + 2 units for every 50 mg/dl above 150 mg/dl. Max 100 units per day    Insulin Needles, Disposable, (BD ULTRA-FINE MINI PEN NEEDLE) 31 gauge x 3/16\" ndle Use as directed 3 times daily    lisinopril (PRINIVIL, ZESTRIL) 40 mg tablet TAKE 1 TABLET BY MOUTH EVERY DAY    hydroCHLOROthiazide (HYDRODIURIL) 25 mg tablet TAKE 1 TABLET BY MOUTH EVERY DAY    amLODIPine (NORVASC) 10 mg tablet TAKE 1 TABLET BY MOUTH DAILY    insulin syringe-needle U-100 1 mL 31 gauge x 15/64\" syrg 1 Syringe by SubCUTAneous route five (5) times daily.  aspirin 81 mg chewable tablet Take 81 mg by mouth daily.     ONETOUCH ULTRA TEST strip Test up to 6 times daily. Dx: 250.03    ONE TOUCH DELICA 33 gauge misc Use as directed up to 6 times daily. Dx: 250.03    cholecalciferol, vitamin D3, (VITAMIN D3) 2,000 unit Tab Take  by mouth daily.  MV-MN/FA/D3/LYCOPENE/LUT/COQ10 (DAILY MULTIVITAMIN PO) Take  by mouth.  cyanocobalamin (VITAMIN B-12) 500 mcg tablet Take 1,000 mcg by mouth daily. No current facility-administered medications for this visit. No Known Allergies     Review of Systems:  - Eyes: no blurry vision or double vision  - Cardiovascular: no chest pain  - Respiratory: no shortness of breath  - Musculoskeletal: no myalgias  - Neurological: no numbness/tingling in extremities    Physical Examination:  Blood pressure 141/74, pulse 96, height 5' 4\" (1.626 m), weight 214 lb 6.4 oz (97.3 kg). - General: pleasant, no distress, good eye contact   - Neck: no carotid bruits  - Cardiovascular: regular, normal rate, nl s1 and s2, no m/r/g,   - Respiratory: clear bilaterally  - Integumentary: no edema,   - Psychiatric: normal mood and affect    Data Reviewed:   Component      Latest Ref Rng & Units 4/23/2019 4/23/2019 4/23/2019 4/23/2019           4:15 PM  4:15 PM  4:15 PM  4:15 PM   Glucose      65 - 99 mg/dL   284 (H)    BUN      7 - 25 mg/dL   30 (H)    Creatinine      0.70 - 1.33 mg/dL   1.16    GFR est non-AA      > OR = 60 mL/min/1.73m2   73    GFR est AA      > OR = 60 mL/min/1.73m2   85    BUN/Creatinine ratio      6 - 22 (calc)   26 (H)    Sodium      135 - 146 mmol/L   132 (L)    Potassium      3.5 - 5.3 mmol/L   5.3    Chloride      98 - 110 mmol/L   97 (L)    CO2      20 - 32 mmol/L   27    Calcium      8.6 - 10.3 mg/dL   9.9    Protein, total      6.1 - 8.1 g/dL   7.1    Albumin      3.6 - 5.1 g/dL   4.5    Globulin      1.9 - 3.7 g/dL (calc)   2.6    ALB/GLOBRATIO      1.0 - 2.5 (calc)   1.7    Bilirubin, total      0.2 - 1.2 mg/dL   0.3    Alk.  phosphatase      40 - 115 U/L   93    AST      10 - 35 U/L   19    ALT (SGPT)      9 - 46 U/L   14    Cholesterol, total      <200 mg/dL  218 (H)     HDL Cholesterol      >40 mg/dL  36 (L)     Triglyceride      <150 mg/dL  264 (H)     LDL-CHOLESTEROL      mg/dL (calc)  141 (H)     Cholesterol/HDL ratio      <5.0 (calc)  6.1 (H)     Non-HDL Cholesterol      <130 mg/dL (calc)  182 (H)     Hemoglobin A1c, (calculated)      <5.7 % of total Hgb    9.9 (H)   eAG (mg/dL)      (calc)    237   eAG (mmol/L)      (calc)    13.2   VITAMIN D, 25-HYDROXY      30 - 100 ng/mL 36          Assessment/Plan:     1. DM w/o complication type I, uncontrolled (250.03) his most recent Hgb A1c was 9.9% in 4/19 up from 7.7% in 10/18 down from 8.1% in 3/18 up from 7.4% in 9/17 down from 8.3% in 4/17 down from 8.8% in 11/16 up from 8.3% in 6/16 down from 8.6% in 2/16 up from 8.3% in 9/15 down from 9.4% in 4/15 up from 9.3% in 1/14 down from 10.4% in 9/13 up from 10.1% in 5/13 up from 8.1% in 1/13 down from 8.7% in Oct up from 8.3% in May up from 7.7% in February 2012 up from 6.8% in November 2011 down from 9% in September. A1c is up due to underdosing his tresiba so will go back up on his dose to gain better control  - increase tresiba to 110 units in the morning  - cont novolog 10 units before each meal + 2 units for every 50 mg/dl above 150 mg/dl   - check bs up to 6 times per day due to fluctuating blood sugars  - foot exam done 7/18 by podiatry  - kristina ALFORD 2/19  - microalbumin nl 2/12 and 5/12, up to 37 in 5/13, down to 23 in 6/16, up to 47 in 4/17 and 148 in 3/18--repeat today  - check A1c and cmp prior to next visit (send to [address])       2. HTN (hypertension) (401. 9AF) his BP was just above goal < 140/90. Will focus on weight loss. - cont norvasc 10 mg daily  - cont lisinopril 40 mg daily  - cont hctz 25 mg daily  - monitor home readings       3. Other and unspecified hyperlipidemia (272.4) Given DM and TIA, Goal LDL < 70, non-HDL < 100, and TG < 150. LDL 94 in November 2011 and 97 in February 2012.   Up to 106 in May despite switching to lipitor but back to 97 in Oct 2012 and 65 in 1/13 but up to 131 in 5/13 due to diet. Had been off lipitor for 12 days in 9/13 and LDL was unable to be calculated due to TG of 565 and non-HDL of 228.  in 1/14. Couldn't be calculated in 4/15 due to high TG of 874 while off lipitor.  and  in 9/15.  and  in 2/16 and 175 and 231 and LDL 96 in 6/16.  and  in 11/16 due to being off lipitor for 1 week but down to TC of 204 and TG of 253 in 4/17.  and  in 9/17.  and  in 3/18.  and  in 10/18 so stopped lipitor and changed to crestor 20 mg but had myalgias so decreased to 1/2 tab in 1/19 and  and  in 4/19 with higher A1c.  - cont crestor 20 mg 1/2 tab at night  - check lipids prior to next visit     4. Vitamin D deficiency (268.9) His level was 17.2 in February and up to 29.9 in May and 39.4 in Oct 2012 and 45 in 1/13 and 36 in 5/13. Down to 25.6 in 9/13 but had been off vitamin D and up to 31.9 in 1/14. Currently off vitamin D and level 14.7 in 4/15 and 19.7 in 9/15. Back on since then and 43 in 2/16 and 41 in 6/16 and 39 in 4/17 and 33 in 3/18 and 36 in 4/19. Goal > 30.       - cont vitamin D 2000 units daily       - check Vitamin D 25-OH level in 4/20      Patient Instructions   1) Starting tomorrow take 110 units of tresiba and see if this helps keep your sugar under 130 at 1am and if so, this is a good dose. If you are dropping under 90 without eating, this is too much and try cutting back to 100 units daily. If you are rising over 150 consistently without eating, try 120 units in the morning. 2) Your A1c was higher but hopefully with going back on the tresiba, your value will come back down. 3) Your cholesterol was higher due to the higher A1c. Follow-up and Dispositions    · Return in about 6 months (around 10/30/2019) for fasting labs--[facility].                Copy sent to:  Dr. Nikita Davis     Lab follow up: 5/1/19    Component      Latest Ref Rng & Units 4/30/2019           4:43 PM   Creatinine, urine      Not Estab. mg/dL 55.9   Microalbumin, urine      Not Estab. ug/mL 78.9   Microalbumin/Creat. Ratio      0.0 - 30.0 mg/g creat 141.1 (H)     Sent him the following message through [facility]:  Microalbumin/creatinine ratio is a marker of the amount of protein in your urine. Goal is less than 30. Your value is abnormal at 141 but improved from 148 in 3/18. This indicates that your kidneys are  being slightly less affected by your uncontrolled diabetes and/or blood pressure than last year.

## 2019-05-01 LAB
ALBUMIN/CREAT UR: 141.1 MG/G CREAT (ref 0–30)
CREAT UR-MCNC: 55.9 MG/DL
MICROALBUMIN UR-MCNC: 78.9 UG/ML

## 2019-10-09 ENCOUNTER — TELEPHONE (OUTPATIENT)
Dept: ENDOCRINOLOGY | Age: 49
End: 2019-10-09

## 2019-10-09 NOTE — TELEPHONE ENCOUNTER
Pt has an appointment scheduled in November. He called stated he needs an order to do his lab. He can be reach @ [phone].

## 2019-10-09 NOTE — TELEPHONE ENCOUNTER
Ova Alberta, it appears he did not have a fasting lab scheduled along with his office visit after his last appointment in April. Please give him a fasting lab for sometime in the week before his visit with me. Thanks.

## 2019-10-22 ENCOUNTER — LAB ONLY (OUTPATIENT)
Dept: ENDOCRINOLOGY | Age: 49
End: 2019-10-22

## 2019-10-22 DIAGNOSIS — E78.5 HYPERLIPIDEMIA LDL GOAL <70: ICD-10-CM

## 2019-10-22 DIAGNOSIS — E10.65 UNCONTROLLED TYPE 1 DIABETES MELLITUS WITH HYPERGLYCEMIA (HCC): Primary | ICD-10-CM

## 2019-10-23 LAB
ALB/GLOBRATIO, 58C: 1.8 (CALC) (ref 1–2.5)
ALBUMIN SERPL-MCNC: 4.6 G/DL (ref 3.6–5.1)
ALP SERPL-CCNC: 86 U/L (ref 40–115)
ALT SERPL-CCNC: 16 U/L (ref 9–46)
AST SERPL W P-5'-P-CCNC: 26 U/L (ref 10–35)
BILIRUB SERPL-MCNC: 0.4 MG/DL (ref 0.2–1.2)
BUN SERPL-MCNC: 32 MG/DL (ref 7–25)
BUN/CREATININE RATIO,BUCR: 27 (CALC) (ref 6–22)
CALCIUM SERPL-MCNC: 10.2 MG/DL (ref 8.6–10.3)
CHLORIDE SERPL-SCNC: 100 MMOL/L (ref 98–110)
CHOL/HDL RATIO,CHHDX: 5.1 (CALC)
CHOLEST SERPL-MCNC: 190 MG/DL
CO2 SERPL-SCNC: 29 MMOL/L (ref 20–32)
CREAT SERPL-MCNC: 1.19 MG/DL (ref 0.7–1.33)
EAG (MG/DL),9916804: 186 (CALC)
EAG (MMOL/L),9916805: 10.3 (CALC)
GLOBULIN,GLOB: 2.5 G/DL (CALC) (ref 1.9–3.7)
GLUCOSE SERPL-MCNC: 43 MG/DL (ref 65–99)
HBA1C MFR BLD HPLC: 8.1 % OF TOTAL HGB
HDLC SERPL-MCNC: 37 MG/DL
LDL-CHOLESTEROL: 123 MG/DL (CALC)
NON-HDL CHOLESTEROL, 011976: 153 MG/DL (CALC)
POTASSIUM SERPL-SCNC: 4.6 MMOL/L (ref 3.5–5.3)
PROT SERPL-MCNC: 7.1 G/DL (ref 6.1–8.1)
SODIUM SERPL-SCNC: 137 MMOL/L (ref 135–146)
TRIGL SERPL-MCNC: 186 MG/DL (ref ?–150)

## 2019-11-01 ENCOUNTER — OFFICE VISIT (OUTPATIENT)
Dept: ENDOCRINOLOGY | Age: 49
End: 2019-11-01

## 2019-11-01 VITALS
HEART RATE: 87 BPM | OXYGEN SATURATION: 97 % | SYSTOLIC BLOOD PRESSURE: 148 MMHG | WEIGHT: 213 LBS | RESPIRATION RATE: 16 BRPM | HEIGHT: 64 IN | DIASTOLIC BLOOD PRESSURE: 78 MMHG | TEMPERATURE: 95.7 F | BODY MASS INDEX: 36.37 KG/M2

## 2019-11-01 DIAGNOSIS — E10.65 UNCONTROLLED TYPE 1 DIABETES MELLITUS WITH HYPERGLYCEMIA (HCC): Primary | ICD-10-CM

## 2019-11-01 DIAGNOSIS — E78.5 HYPERLIPIDEMIA LDL GOAL <70: ICD-10-CM

## 2019-11-01 DIAGNOSIS — I10 ESSENTIAL HYPERTENSION, BENIGN: ICD-10-CM

## 2019-11-01 DIAGNOSIS — E55.9 VITAMIN D DEFICIENCY: ICD-10-CM

## 2019-11-01 RX ORDER — MULTIVIT WITH MINERALS/HERBS
1 TABLET ORAL DAILY
COMMUNITY

## 2019-11-01 RX ORDER — INSULIN DEGLUDEC INJECTION 200 U/ML
INJECTION, SOLUTION SUBCUTANEOUS
Qty: 54 ML | Refills: 3
Start: 2019-11-01 | End: 2020-07-17

## 2019-11-01 RX ORDER — DULOXETIN HYDROCHLORIDE 30 MG/1
30 CAPSULE, DELAYED RELEASE ORAL DAILY
Qty: 90 CAP | Refills: 3 | Status: SHIPPED | OUTPATIENT
Start: 2019-11-01 | End: 2019-11-22

## 2019-11-01 RX ORDER — INSULIN DEGLUDEC INJECTION 200 U/ML
INJECTION, SOLUTION SUBCUTANEOUS
Qty: 54 ML | Refills: 3
Start: 2019-11-01 | End: 2019-11-01

## 2019-11-01 RX ORDER — NAPROXEN 500 MG/1
500 TABLET ORAL AS NEEDED
COMMUNITY
Start: 2019-10-30 | End: 2019-12-08

## 2019-11-01 NOTE — PROGRESS NOTES
Chief Complaint   Patient presents with    Diabetes     History of Present Illness: Olivia Tom is a 46 y.o. male here for follow up of diabetes. Weight down 1 lbs since last visit in 4/19. Went back up to 110 units of tresiba and then 112 units and has stayed on this dose. Has had times where his sugars are in the 30s-40s if he doesn't eat for long stretches and is more active. Was hypoglycemic on day of lab draw after fasting. He has been able to keep his sugars under better control on the higher dose of tresiba with most readings under 200. Compliant with BP and lipid regimen. Has had more trouble with neuropathy despite taking the b-complex vitamin and would like to start a med to help. Current Outpatient Medications   Medication Sig    naproxen (NAPROSYN) 500 mg tablet Take 500 mg by mouth as needed.  TRESIBA FLEXTOUCH U-200 200 unit/mL (3 mL) inpn Inject 112 units in the morning--dispense 18 pens for 90 day supply--Dose change 11/1/19--updated med list--did not send prescription to the pharmacy    b complex vitamins (B COMPLEX 1) tablet Take 1 Tab by mouth daily.  rosuvastatin (CRESTOR) 20 mg tablet Take 1/2 tab daily--Dose change 1/17/19--updated med list--did not send prescription to the pharmacy    insulin aspart U-100 (NOVOLOG FLEXPEN U-100 INSULIN) 100 unit/mL inpn 10 Units by SubCUTAneous route three (3) times daily. + 2 units for every 50 mg/dl above 150 mg/dl. Max 100 units per day    Insulin Needles, Disposable, (BD ULTRA-FINE MINI PEN NEEDLE) 31 gauge x 3/16\" ndle Use as directed 3 times daily    lisinopril (PRINIVIL, ZESTRIL) 40 mg tablet TAKE 1 TABLET BY MOUTH EVERY DAY    hydroCHLOROthiazide (HYDRODIURIL) 25 mg tablet TAKE 1 TABLET BY MOUTH EVERY DAY    amLODIPine (NORVASC) 10 mg tablet TAKE 1 TABLET BY MOUTH DAILY    insulin syringe-needle U-100 1 mL 31 gauge x 15/64\" syrg 1 Syringe by SubCUTAneous route five (5) times daily.     aspirin 81 mg chewable tablet Take 81 mg by mouth daily.  ONETOUCH ULTRA TEST strip Test up to 6 times daily. Dx: 250.03    ONE TOUCH DELICA 33 gauge misc Use as directed up to 6 times daily. Dx: 250.03    cholecalciferol, vitamin D3, (VITAMIN D3) 2,000 unit Tab Take  by mouth daily.  MV-MN/FA/D3/LYCOPENE/LUT/COQ10 (DAILY MULTIVITAMIN PO) Take  by mouth. No current facility-administered medications for this visit. No Known Allergies     Review of Systems:  - Eyes: no blurry vision or double vision  - Cardiovascular: no chest pain  - Respiratory: no shortness of breath  - Musculoskeletal: no myalgias  - Neurological: (+) numbness/tingling in extremities    Physical Examination:  Blood pressure 148/78, pulse 87, temperature 95.7 °F (35.4 °C), temperature source Oral, resp. rate 16, height 5' 4\" (1.626 m), weight 213 lb (96.6 kg), SpO2 97 %. - General: pleasant, no distress, good eye contact   - Neck: no carotid bruits  - Cardiovascular: regular, normal rate, nl s1 and s2, no m/r/g,   - Respiratory: clear bilaterally  - Integumentary: no edema,   - Psychiatric: normal mood and affect    Data Reviewed:   Component      Latest Ref Rng & Units 10/22/2019 10/22/2019 10/22/2019           9:01 AM  9:01 AM  9:01 AM   Glucose      65 - 99 mg/dL  43 (L)    BUN      7 - 25 mg/dL  32 (H)    Creatinine      0.70 - 1.33 mg/dL  1.19    GFR est non-AA      > OR = 60 mL/min/1.73m2  71    GFR est AA      > OR = 60 mL/min/1.73m2  82    BUN/Creatinine ratio      6 - 22 (calc)  27 (H)    Sodium      135 - 146 mmol/L  137    Potassium      3.5 - 5.3 mmol/L  4.6    Chloride      98 - 110 mmol/L  100    CO2      20 - 32 mmol/L  29    Calcium      8.6 - 10.3 mg/dL  10.2    Protein, total      6.1 - 8.1 g/dL  7.1    Albumin      3.6 - 5.1 g/dL  4.6    Globulin      1.9 - 3.7 g/dL (calc)  2.5    ALB/GLOBRATIO      1.0 - 2.5 (calc)  1.8    Bilirubin, total      0.2 - 1.2 mg/dL  0.4    Alk.  phosphatase      40 - 115 U/L  86    AST      10 - 35 U/L  26    ALT (SGPT)      9 - 46 U/L  16    Cholesterol, total      <200 mg/dL 190     HDL Cholesterol      >40 mg/dL 37 (L)     Triglyceride      <150 mg/dL 186 (H)     LDL-CHOLESTEROL      mg/dL (calc) 123 (H)     Cholesterol/HDL ratio      <5.0 (calc) 5.1 (H)     Non-HDL Cholesterol      <130 mg/dL (calc) 153 (H)     Hemoglobin A1c, (calculated)      <5.7 % of total Hgb   8.1 (H)   eAG (mg/dL)      (calc)   186   eAG (mmol/L)      (calc)   10.3       Assessment/Plan:     1. DM w/o complication type I, uncontrolled (250.03) his most recent Hgb A1c was 8.1% in 10/19 down from 9.9% in 4/19 up from 7.7% in 10/18 down from 8.1% in 3/18 up from 7.4% in 9/17 down from 8.3% in 4/17 down from 8.8% in 11/16 up from 8.3% in 6/16 down from 8.6% in 2/16 up from 8.3% in 9/15 down from 9.4% in 4/15 up from 9.3% in 1/14 down from 10.4% in 9/13 up from 10.1% in 5/13 up from 8.1% in 1/13 down from 8.7% in Oct up from 8.3% in May up from 7.7% in February 2012 up from 6.8% in November 2011 down from 9% in September. A1c is much better but still above goal of <7.5% given hypoglycemia unawareness. Will decrease tresiba to help with low blood sugars. - decrease tresiba to 100 units in the morning  - cont novolog 10 units before each meal + 2 units for every 50 mg/dl above 150 mg/dl   - check bs up to 6 times per day due to fluctuating blood sugars  - foot exam done 7/18 by podiatry  - kristina ALFORD 2/19  - microalbumin nl 2/12 and 5/12, up to 37 in 5/13, down to 23 in 6/16, up to 47 in 4/17 and 148 in 3/18 and 141 in 4/19  - check A1c and cmp and microalbumin prior to next visit (send to [facility])       2. HTN (hypertension) (401. 9AF) his BP was just above goal < 140/90. Will focus on weight loss. - cont norvasc 10 mg daily  - cont lisinopril 40 mg daily  - cont hctz 25 mg daily  - monitor home readings       3. Other and unspecified hyperlipidemia (272.4) Given DM and TIA, Goal LDL < 70, non-HDL < 100, and TG < 150.   LDL 94 in November 2011 and 97 in February 2012. Up to 106 in May despite switching to lipitor but back to 97 in Oct 2012 and 65 in 1/13 but up to 131 in 5/13 due to diet. Had been off lipitor for 12 days in 9/13 and LDL was unable to be calculated due to TG of 565 and non-HDL of 228.  in 1/14. Couldn't be calculated in 4/15 due to high TG of 874 while off lipitor.  and  in 9/15.  and  in 2/16 and 175 and 231 and LDL 96 in 6/16.  and  in 11/16 due to being off lipitor for 1 week but down to TC of 204 and TG of 253 in 4/17.  and  in 9/17.  and  in 3/18.  and  in 10/18 so stopped lipitor and changed to crestor 20 mg but had myalgias so decreased to 1/2 tab in 1/19 and  and  in 4/19 with higher A1c.  and  in 10/19 with lower A1c.  - cont crestor 20 mg 1/2 tab at night  - check lipids prior to next visit     4. Vitamin D deficiency (268.9) His level was 17.2 in February and up to 29.9 in May and 39.4 in Oct 2012 and 45 in 1/13 and 36 in 5/13. Down to 25.6 in 9/13 but had been off vitamin D and up to 31.9 in 1/14. Currently off vitamin D and level 14.7 in 4/15 and 19.7 in 9/15. Back on since then and 43 in 2/16 and 41 in 6/16 and 39 in 4/17 and 33 in 3/18 and 36 in 4/19. Goal > 30.       - cont vitamin D 2000 units daily       - check Vitamin D 25-OH level prior to next visit      Patient Instructions   1) We try cymbalta 30 mg at bedtime for nerve pain. This will be ready for  at the pharmacy today. If you have taken for 2 weeks and are still having pain, you can increase to 2 caps at bedtime and if this is working better, let me know so I can rewrite the prescription for 60 mg capsules and let me know if you want this sent to [facility]. 2) Your A1c is much better at 8.1% but I'm concerned about low sugars. Cut back on tresiba to 100 units daily. 3) Your cholesterol is much better with lower sugar.     4) Your creatinine (kidney test) is normal but your BUN (marker of hydration) is still slightly high so keep drinking plenty of water. 5) ALT and AST are markers of liver function. Your values are normal.    6) Your blood pressure was up but normally is better. Follow-up and Dispositions    · Return in about 6 months (around 5/1/2020) for fasting labs--[facility].                Copy sent to:  Dr. Deepa Reynoso

## 2019-11-01 NOTE — PROGRESS NOTES
Identified pt with two pt identifiers(name and ). Reviewed record in preparation for visit and have obtained necessary documentation. Chief Complaint   Patient presents with    Diabetes        Visit Vitals  /78 (BP 1 Location: Left arm, BP Patient Position: Sitting)   Pulse 87   Temp 95.7 °F (35.4 °C) (Oral)   Resp 16   Ht 5' 4\" (1.626 m)   Wt 213 lb (96.6 kg)   SpO2 97%   BMI 36.56 kg/m²       Pain Scale: 0 - No pain/10  Pain Location:     1) Have you been to an emergency room, urgent care, or hospitalized since your last visit? If yes, where when, and reason for visit? no       2. Have seen or consulted any other health care provider since your last visit? If yes, where when, and reason for visit?   NO

## 2019-11-01 NOTE — PATIENT INSTRUCTIONS
1) We try cymbalta 30 mg at bedtime for nerve pain. This will be ready for  at the pharmacy today. If you have taken for 2 weeks and are still having pain, you can increase to 2 caps at bedtime and if this is working better, let me know so I can rewrite the prescription for 60 mg capsules and let me know if you want this sent to [facility]. 2) Your A1c is much better at 8.1% but I'm concerned about low sugars. Cut back on tresiba to 100 units daily. 3) Your cholesterol is much better with lower sugar. 4) Your creatinine (kidney test) is normal but your BUN (marker of hydration) is still slightly high so keep drinking plenty of water. 5) ALT and AST are markers of liver function. Your values are normal.    6) Your blood pressure was up but normally is better.

## 2019-11-02 RX ORDER — AMLODIPINE BESYLATE 10 MG/1
TABLET ORAL
Qty: 90 TAB | Refills: 3 | Status: ON HOLD | OUTPATIENT
Start: 2019-11-02 | End: 2019-12-08 | Stop reason: SDUPTHER

## 2019-11-02 RX ORDER — LISINOPRIL 40 MG/1
TABLET ORAL
Qty: 90 TAB | Refills: 3 | Status: ON HOLD | OUTPATIENT
Start: 2019-11-02 | End: 2019-12-08 | Stop reason: SDUPTHER

## 2019-11-02 RX ORDER — INSULIN ASPART 100 [IU]/ML
INJECTION, SOLUTION INTRAVENOUS; SUBCUTANEOUS
Qty: 90 ML | Refills: 3 | Status: SHIPPED | OUTPATIENT
Start: 2019-11-02 | End: 2020-07-17

## 2019-11-02 RX ORDER — INSULIN DEGLUDEC INJECTION 200 U/ML
INJECTION, SOLUTION SUBCUTANEOUS
Qty: 54 ML | Refills: 3 | Status: SHIPPED | OUTPATIENT
Start: 2019-11-02 | End: 2019-12-08

## 2019-11-07 RX ORDER — HYDROCHLOROTHIAZIDE 25 MG/1
TABLET ORAL
Qty: 90 TAB | Refills: 3 | Status: SHIPPED | OUTPATIENT
Start: 2019-11-07 | End: 2020-07-17

## 2019-11-22 ENCOUNTER — TELEPHONE (OUTPATIENT)
Dept: ENDOCRINOLOGY | Age: 49
End: 2019-11-22

## 2019-11-22 RX ORDER — DULOXETIN HYDROCHLORIDE 30 MG/1
30 CAPSULE, DELAYED RELEASE ORAL 2 TIMES DAILY
Qty: 180 CAP | Refills: 3 | Status: SHIPPED | OUTPATIENT
Start: 2019-11-22 | End: 2020-07-17

## 2019-11-22 NOTE — TELEPHONE ENCOUNTER
----- Message from Korey Carrera sent at 11/22/2019  3:15 AM EST -----  Regarding: Prescription Question  Contact: [phone]  Dr Nacho Schneider I started taking two pills one in the morning and one before I go to bed for my foot pain. So I don't know if that is alright but it seems to be working if you want to send the prescription to mail order.  Thanks

## 2019-11-29 ENCOUNTER — APPOINTMENT (OUTPATIENT)
Dept: GENERAL RADIOLOGY | Age: 49
DRG: 871 | End: 2019-11-29
Attending: EMERGENCY MEDICINE
Payer: COMMERCIAL

## 2019-11-29 ENCOUNTER — HOSPITAL ENCOUNTER (INPATIENT)
Age: 49
LOS: 9 days | Discharge: HOME | DRG: 871 | End: 2019-12-08
Attending: EMERGENCY MEDICINE | Admitting: INTERNAL MEDICINE
Payer: COMMERCIAL

## 2019-11-29 DIAGNOSIS — J18.9 PNEUMONIA OF BOTH LUNGS DUE TO INFECTIOUS ORGANISM, UNSPECIFIED PART OF LUNG: Primary | ICD-10-CM

## 2019-11-29 PROBLEM — N17.9 AKI (ACUTE KIDNEY INJURY) (HCC): Status: ACTIVE | Noted: 2019-11-29

## 2019-11-29 PROBLEM — A41.9 SEVERE SEPSIS (HCC): Status: ACTIVE | Noted: 2019-11-29

## 2019-11-29 PROBLEM — R65.20 SEVERE SEPSIS (HCC): Status: ACTIVE | Noted: 2019-11-29

## 2019-11-29 LAB
ALBUMIN SERPL-MCNC: 3.3 G/DL (ref 3.5–5)
ALBUMIN/GLOB SERPL: 0.8 {RATIO} (ref 1.1–2.2)
ALP SERPL-CCNC: 103 U/L (ref 45–117)
ALT SERPL-CCNC: 17 U/L (ref 12–78)
ANION GAP SERPL CALC-SCNC: 9 MMOL/L (ref 5–15)
APPEARANCE UR: CLEAR
AST SERPL-CCNC: 36 U/L (ref 15–37)
BACTERIA URNS QL MICRO: ABNORMAL /HPF
BASOPHILS # BLD: 0 K/UL (ref 0–0.1)
BASOPHILS NFR BLD: 0 % (ref 0–1)
BILIRUB SERPL-MCNC: 0.6 MG/DL (ref 0.2–1)
BILIRUB UR QL: NEGATIVE
BUN SERPL-MCNC: 49 MG/DL (ref 6–20)
BUN/CREAT SERPL: 25 (ref 12–20)
CALCIUM SERPL-MCNC: 9 MG/DL (ref 8.5–10.1)
CHLORIDE SERPL-SCNC: 102 MMOL/L (ref 97–108)
CO2 SERPL-SCNC: 27 MMOL/L (ref 21–32)
COLOR UR: ABNORMAL
CREAT SERPL-MCNC: 1.96 MG/DL (ref 0.7–1.3)
DIFFERENTIAL METHOD BLD: ABNORMAL
EOSINOPHIL # BLD: 0 K/UL (ref 0–0.4)
EOSINOPHIL NFR BLD: 0 % (ref 0–7)
EPITH CASTS URNS QL MICRO: ABNORMAL /LPF
ERYTHROCYTE [DISTWIDTH] IN BLOOD BY AUTOMATED COUNT: 12.5 % (ref 11.5–14.5)
FLUAV AG NPH QL IA: NEGATIVE
FLUBV AG NOSE QL IA: NEGATIVE
GLOBULIN SER CALC-MCNC: 4.1 G/DL (ref 2–4)
GLUCOSE BLD STRIP.AUTO-MCNC: 331 MG/DL (ref 65–100)
GLUCOSE SERPL-MCNC: 317 MG/DL (ref 65–100)
GLUCOSE UR STRIP.AUTO-MCNC: >1000 MG/DL
HCT VFR BLD AUTO: 38.9 % (ref 36.6–50.3)
HGB BLD-MCNC: 12.9 G/DL (ref 12.1–17)
HGB UR QL STRIP: NEGATIVE
HYALINE CASTS URNS QL MICRO: ABNORMAL /LPF (ref 0–5)
IMM GRANULOCYTES # BLD AUTO: 0 K/UL (ref 0–0.04)
IMM GRANULOCYTES NFR BLD AUTO: 0 % (ref 0–0.5)
KETONES UR QL STRIP.AUTO: NEGATIVE MG/DL
LACTATE BLD-SCNC: 2.15 MMOL/L (ref 0.4–2)
LEUKOCYTE ESTERASE UR QL STRIP.AUTO: NEGATIVE
LYMPHOCYTES # BLD: 1.2 K/UL (ref 0.8–3.5)
LYMPHOCYTES NFR BLD: 6 % (ref 12–49)
MCH RBC QN AUTO: 30.9 PG (ref 26–34)
MCHC RBC AUTO-ENTMCNC: 33.2 G/DL (ref 30–36.5)
MCV RBC AUTO: 93.3 FL (ref 80–99)
MONOCYTES # BLD: 1 K/UL (ref 0–1)
MONOCYTES NFR BLD: 5 % (ref 5–13)
NEUTS BAND NFR BLD MANUAL: 13 %
NEUTS SEG # BLD: 17.1 K/UL (ref 1.8–8)
NEUTS SEG NFR BLD: 76 % (ref 32–75)
NITRITE UR QL STRIP.AUTO: NEGATIVE
NRBC # BLD: 0 K/UL (ref 0–0.01)
NRBC BLD-RTO: 0 PER 100 WBC
PH UR STRIP: 5 [PH] (ref 5–8)
PLATELET # BLD AUTO: 275 K/UL (ref 150–400)
PMV BLD AUTO: 11.1 FL (ref 8.9–12.9)
POTASSIUM SERPL-SCNC: 4.8 MMOL/L (ref 3.5–5.1)
PROT SERPL-MCNC: 7.4 G/DL (ref 6.4–8.2)
PROT UR STRIP-MCNC: 30 MG/DL
RBC # BLD AUTO: 4.17 M/UL (ref 4.1–5.7)
RBC #/AREA URNS HPF: ABNORMAL /HPF (ref 0–5)
RBC MORPH BLD: ABNORMAL
SERVICE CMNT-IMP: ABNORMAL
SODIUM SERPL-SCNC: 138 MMOL/L (ref 136–145)
SP GR UR REFRACTOMETRY: 1.02 (ref 1–1.03)
UA: UC IF INDICATED,UAUC: ABNORMAL
UROBILINOGEN UR QL STRIP.AUTO: 0.2 EU/DL (ref 0.2–1)
WBC # BLD AUTO: 19.3 K/UL (ref 4.1–11.1)
WBC URNS QL MICRO: ABNORMAL /HPF (ref 0–4)

## 2019-11-29 PROCEDURE — 94640 AIRWAY INHALATION TREATMENT: CPT

## 2019-11-29 PROCEDURE — 81001 URINALYSIS AUTO W/SCOPE: CPT

## 2019-11-29 PROCEDURE — 74011000258 HC RX REV CODE- 258: Performed by: EMERGENCY MEDICINE

## 2019-11-29 PROCEDURE — 96365 THER/PROPH/DIAG IV INF INIT: CPT

## 2019-11-29 PROCEDURE — 74011250637 HC RX REV CODE- 250/637: Performed by: EMERGENCY MEDICINE

## 2019-11-29 PROCEDURE — 80053 COMPREHEN METABOLIC PANEL: CPT

## 2019-11-29 PROCEDURE — 65660000000 HC RM CCU STEPDOWN

## 2019-11-29 PROCEDURE — 85025 COMPLETE CBC W/AUTO DIFF WBC: CPT

## 2019-11-29 PROCEDURE — 74011636637 HC RX REV CODE- 636/637: Performed by: INTERNAL MEDICINE

## 2019-11-29 PROCEDURE — 96375 TX/PRO/DX INJ NEW DRUG ADDON: CPT

## 2019-11-29 PROCEDURE — 71046 X-RAY EXAM CHEST 2 VIEWS: CPT

## 2019-11-29 PROCEDURE — 74011250636 HC RX REV CODE- 250/636: Performed by: INTERNAL MEDICINE

## 2019-11-29 PROCEDURE — 74011000250 HC RX REV CODE- 250: Performed by: EMERGENCY MEDICINE

## 2019-11-29 PROCEDURE — 87040 BLOOD CULTURE FOR BACTERIA: CPT

## 2019-11-29 PROCEDURE — 36415 COLL VENOUS BLD VENIPUNCTURE: CPT

## 2019-11-29 PROCEDURE — 87804 INFLUENZA ASSAY W/OPTIC: CPT

## 2019-11-29 PROCEDURE — 74011250636 HC RX REV CODE- 250/636: Performed by: EMERGENCY MEDICINE

## 2019-11-29 PROCEDURE — 83605 ASSAY OF LACTIC ACID: CPT

## 2019-11-29 PROCEDURE — 74011250637 HC RX REV CODE- 250/637: Performed by: INTERNAL MEDICINE

## 2019-11-29 PROCEDURE — 87086 URINE CULTURE/COLONY COUNT: CPT

## 2019-11-29 PROCEDURE — 82962 GLUCOSE BLOOD TEST: CPT

## 2019-11-29 RX ORDER — INSULIN GLARGINE 100 [IU]/ML
45 INJECTION, SOLUTION SUBCUTANEOUS DAILY
Status: DISCONTINUED | OUTPATIENT
Start: 2019-11-30 | End: 2019-11-29

## 2019-11-29 RX ORDER — MAGNESIUM SULFATE 100 %
4 CRYSTALS MISCELLANEOUS AS NEEDED
Status: DISCONTINUED | OUTPATIENT
Start: 2019-11-29 | End: 2019-11-30 | Stop reason: SDUPTHER

## 2019-11-29 RX ORDER — GUAIFENESIN 600 MG/1
600 TABLET, EXTENDED RELEASE ORAL EVERY 12 HOURS
Status: DISCONTINUED | OUTPATIENT
Start: 2019-11-29 | End: 2019-12-08 | Stop reason: HOSPADM

## 2019-11-29 RX ORDER — HEPARIN SODIUM 5000 [USP'U]/ML
5000 INJECTION, SOLUTION INTRAVENOUS; SUBCUTANEOUS EVERY 8 HOURS
Status: DISCONTINUED | OUTPATIENT
Start: 2019-11-29 | End: 2019-12-08 | Stop reason: HOSPADM

## 2019-11-29 RX ORDER — INSULIN GLARGINE 100 [IU]/ML
90 INJECTION, SOLUTION SUBCUTANEOUS DAILY
Status: DISCONTINUED | OUTPATIENT
Start: 2019-11-30 | End: 2019-11-29

## 2019-11-29 RX ORDER — INSULIN GLARGINE 100 [IU]/ML
45 INJECTION, SOLUTION SUBCUTANEOUS DAILY
Status: DISCONTINUED | OUTPATIENT
Start: 2019-11-30 | End: 2019-11-30

## 2019-11-29 RX ORDER — SODIUM CHLORIDE 0.9 % (FLUSH) 0.9 %
5-40 SYRINGE (ML) INJECTION AS NEEDED
Status: DISCONTINUED | OUTPATIENT
Start: 2019-11-29 | End: 2019-12-08 | Stop reason: HOSPADM

## 2019-11-29 RX ORDER — DULOXETIN HYDROCHLORIDE 30 MG/1
30 CAPSULE, DELAYED RELEASE ORAL 2 TIMES DAILY
Status: DISCONTINUED | OUTPATIENT
Start: 2019-11-29 | End: 2019-12-08 | Stop reason: HOSPADM

## 2019-11-29 RX ORDER — GUAIFENESIN 100 MG/5ML
81 LIQUID (ML) ORAL DAILY
Status: DISCONTINUED | OUTPATIENT
Start: 2019-11-30 | End: 2019-12-08 | Stop reason: HOSPADM

## 2019-11-29 RX ORDER — INSULIN LISPRO 100 [IU]/ML
10 INJECTION, SOLUTION INTRAVENOUS; SUBCUTANEOUS
Status: DISCONTINUED | OUTPATIENT
Start: 2019-11-29 | End: 2019-11-30

## 2019-11-29 RX ORDER — ACETAMINOPHEN 500 MG
1000 TABLET ORAL ONCE
Status: COMPLETED | OUTPATIENT
Start: 2019-11-29 | End: 2019-11-29

## 2019-11-29 RX ORDER — ACETAMINOPHEN 325 MG/1
650 TABLET ORAL
Status: DISCONTINUED | OUTPATIENT
Start: 2019-11-29 | End: 2019-12-08 | Stop reason: HOSPADM

## 2019-11-29 RX ORDER — GUAIFENESIN/DEXTROMETHORPHAN 100-10MG/5
10 SYRUP ORAL
Status: DISCONTINUED | OUTPATIENT
Start: 2019-11-29 | End: 2019-12-08 | Stop reason: HOSPADM

## 2019-11-29 RX ORDER — SODIUM CHLORIDE 9 MG/ML
125 INJECTION, SOLUTION INTRAVENOUS CONTINUOUS
Status: DISCONTINUED | OUTPATIENT
Start: 2019-11-29 | End: 2019-12-01

## 2019-11-29 RX ORDER — IPRATROPIUM BROMIDE 0.5 MG/2.5ML
0.5 SOLUTION RESPIRATORY (INHALATION)
Status: COMPLETED | OUTPATIENT
Start: 2019-11-29 | End: 2019-11-29

## 2019-11-29 RX ORDER — DEXTROSE MONOHYDRATE 25 G/50ML
12.5-25 INJECTION, SOLUTION INTRAVENOUS AS NEEDED
Status: DISCONTINUED | OUTPATIENT
Start: 2019-11-29 | End: 2019-12-07 | Stop reason: SDUPTHER

## 2019-11-29 RX ORDER — INSULIN LISPRO 100 [IU]/ML
INJECTION, SOLUTION INTRAVENOUS; SUBCUTANEOUS
Status: DISCONTINUED | OUTPATIENT
Start: 2019-11-29 | End: 2019-11-30

## 2019-11-29 RX ORDER — ONDANSETRON 2 MG/ML
4 INJECTION INTRAMUSCULAR; INTRAVENOUS
Status: DISCONTINUED | OUTPATIENT
Start: 2019-11-29 | End: 2019-12-08 | Stop reason: HOSPADM

## 2019-11-29 RX ORDER — INSULIN GLARGINE 100 [IU]/ML
30 INJECTION, SOLUTION SUBCUTANEOUS
Status: COMPLETED | OUTPATIENT
Start: 2019-11-29 | End: 2019-11-29

## 2019-11-29 RX ORDER — ALBUTEROL SULFATE 0.83 MG/ML
7.5 SOLUTION RESPIRATORY (INHALATION)
Status: COMPLETED | OUTPATIENT
Start: 2019-11-29 | End: 2019-11-29

## 2019-11-29 RX ORDER — SODIUM CHLORIDE 0.9 % (FLUSH) 0.9 %
5-40 SYRINGE (ML) INJECTION EVERY 8 HOURS
Status: DISCONTINUED | OUTPATIENT
Start: 2019-11-29 | End: 2019-12-08 | Stop reason: HOSPADM

## 2019-11-29 RX ADMIN — SODIUM CHLORIDE 1000 ML: 900 INJECTION, SOLUTION INTRAVENOUS at 15:32

## 2019-11-29 RX ADMIN — ACETAMINOPHEN 1000 MG: 500 TABLET ORAL at 15:34

## 2019-11-29 RX ADMIN — INSULIN LISPRO 10 UNITS: 100 INJECTION, SOLUTION INTRAVENOUS; SUBCUTANEOUS at 21:33

## 2019-11-29 RX ADMIN — GUAIFENESIN 600 MG: 600 TABLET, EXTENDED RELEASE ORAL at 20:08

## 2019-11-29 RX ADMIN — ALBUTEROL SULFATE 7.5 MG: 2.5 SOLUTION RESPIRATORY (INHALATION) at 14:33

## 2019-11-29 RX ADMIN — SODIUM CHLORIDE 125 ML/HR: 900 INJECTION, SOLUTION INTRAVENOUS at 16:58

## 2019-11-29 RX ADMIN — HEPARIN SODIUM 5000 UNITS: 5000 INJECTION INTRAVENOUS; SUBCUTANEOUS at 20:09

## 2019-11-29 RX ADMIN — IPRATROPIUM BROMIDE 0.5 MG: 0.5 SOLUTION RESPIRATORY (INHALATION) at 14:33

## 2019-11-29 RX ADMIN — CEFTRIAXONE SODIUM 2 G: 2 INJECTION, POWDER, FOR SOLUTION INTRAMUSCULAR; INTRAVENOUS at 15:33

## 2019-11-29 RX ADMIN — Medication 10 ML: at 21:34

## 2019-11-29 RX ADMIN — METHYLPREDNISOLONE SODIUM SUCCINATE 125 MG: 125 INJECTION, POWDER, FOR SOLUTION INTRAMUSCULAR; INTRAVENOUS at 15:35

## 2019-11-29 RX ADMIN — INSULIN GLARGINE 30 UNITS: 100 INJECTION, SOLUTION SUBCUTANEOUS at 20:08

## 2019-11-29 RX ADMIN — AZITHROMYCIN MONOHYDRATE 500 MG: 500 INJECTION, POWDER, LYOPHILIZED, FOR SOLUTION INTRAVENOUS at 16:57

## 2019-11-29 RX ADMIN — DULOXETINE 30 MG: 30 CAPSULE, DELAYED RELEASE ORAL at 20:08

## 2019-11-29 NOTE — ROUTINE PROCESS
TRANSFER - OUT REPORT: 
 
Verbal report given to [facility] (name) on Thi Julian  being transferred to [address] [place] [address](unit) for routine progression of care Report consisted of patients Situation, Background, Assessment and  
Recommendations(SBAR). Information from the following report(s) SBAR, Kardex, ED Summary, Intake/Output, MAR, Accordion, Recent Results, Med Rec Status and Cardiac Rhythm NsR was reviewed with the receiving nurse. Lines:  
Peripheral IV 11/29/19 Left Antecubital (Active) Site Assessment Clean, dry, & intact 11/29/2019  3:25 PM  
Phlebitis Assessment 0 11/29/2019  3:25 PM  
Infiltration Assessment 0 11/29/2019  3:25 PM  
Dressing Status Clean, dry, & intact 11/29/2019  3:25 PM  
Dressing Type Transparent 11/29/2019  3:25 PM  
Hub Color/Line Status Pink;Capped;Flushed;Patent 11/29/2019  3:25 PM  
Action Taken Blood drawn 11/29/2019  3:25 PM  
Alcohol Cap Used No 11/29/2019  3:25 PM  
  
 
Opportunity for questions and clarification was provided. Patient transported with: 
 Oxygen 2lpm nasal cannula

## 2019-11-29 NOTE — H&P
Hospitalist Admission Note    NAME: David Louie   :  1970   MRN:  [id number]     Date/Time:  2019 4:21 PM    Patient PCP: Manuel Guardado MD  ______________________________________________________________________  Given the patient's current clinical presentation, I have a high level of concern for decompensation if discharged from the emergency department. Complex decision making was performed, which includes reviewing the patient's available past medical records, laboratory results, and x-ray films. My assessment of this patient's clinical condition and my plan of care is as follows. Assessment / Plan:  Bilateral pneumonia POA  Causing severe sepsis POA  Causing Acute respiratory failure with hypoxia POA- needing 4 L of oxygen via nasal cannula at present  WBC= 19.3  Sinus tachycardia  Lactate = 2.1  CXR= Bronchitis with bilateral lower lobe pneumonia or alveolitis    Admit to medical telemetry bed  Oxygen to keep sats more than 89%, taper as able in the next 24 to 48 hours  IV antibiotics-IV ceftriaxone, IV azithromycin  Mucinex twice daily  Robitussin-DM as needed  IV fluids, serial lactate until less than 2.0  Follow blood culture    GABY POA  IV fluids as above  BMP in a.m.    DM type II uncontrolled with hyperglycemia POA  Fingersticks q. before meals and at bedtime  Continue long-acting insulin as at home lower than home dose for now   SSI lispro here        Code Status: Full code  Surrogate Decision Maker: Wife [facility] [phone]    DVT Prophylaxis: SQ heparin  GI Prophylaxis: not indicated    Baseline: Patient is ambulatory at baseline      Subjective:   CHIEF COMPLAINT: Worsening cough with sputum x 2 weeks    HISTORY OF PRESENT ILLNESS:     Dany Reese is a 46 y.o.  male who presents with above complaint from home with wife ambulatory.   Patient presents with chief complaint of progressive worsening of cough with sputum for the past 2 weeks  History of associated low-grade fever, malaise. History of diabetes managed by endocrinology Dr.Douglas Shields Job  History of wife been sick 2 weeks ago-recovering from respiratory illness now    Patient was found to have severe sepsis with mild GABY with chest x-ray showing bilateral pneumonia in the ER. We were asked to admit for work up and evaluation of the above problems. Past Medical History:   Diagnosis Date    Diabetes ([address]     Other and unspecified hyperlipidemia     Stroke [facility])     TIA September 2011    TIA (transient ischemic attack) Sept 2011    Type I (juvenile type) diabetes mellitus without mention of complication, uncontrolled Age 8    Unspecified essential hypertension     Unspecified vitamin D deficiency 2/27/2012        Past Surgical History:   Procedure Laterality Date    HX CATARACT REMOVAL Left 03/2019       Social History     Tobacco Use    Smoking status: Never Smoker    Smokeless tobacco: Never Used   Substance Use Topics    Alcohol use: Yes     Alcohol/week: 1.0 standard drinks     Types: 1 Cans of beer per week     Comment: Rare beer        Family History   Problem Relation Age of Onset    Cancer Father         Bone cancer    Heart Attack Maternal Grandmother     Heart Attack Maternal Grandfather     Stroke Maternal Grandfather     Heart Attack Paternal Grandmother     Heart Attack Paternal Grandfather     Diabetes Other         2 nieces with Type 1 diabetes    Heart Disease Neg Hx      No Known Allergies     Prior to Admission medications    Medication Sig Start Date End Date Taking? Authorizing Provider   DULoxetine (CYMBALTA) 30 mg capsule Take 1 Cap by mouth two (2) times a day.  11/22/19  Yes Don Malloy MD   hydroCHLOROthiazide (HYDRODIURIL) 25 mg tablet TAKE 1 TABLET BY MOUTH EVERY DAY 11/7/19  Yes Don aMlloy MD   lisinopril (PRINIVIL, ZESTRIL) 40 mg tablet TAKE 1 TABLET DAILY 11/2/19  Yes Don Malloy MD   insulin aspart U-100 (NOVOLOG FLEXPEN U-100 INSULIN) 100 unit/mL (3 mL) inpn FOR DIRECTIONS ON HOW TO   TAKE THIS MEDICINE, READ   THE ENCLOSED MEDICATION    INFORMATION FORM 11/2/19  Yes Mikayla Kirby MD   amLODIPine (NORVASC) 10 mg tablet TAKE 1 TABLET DAILY 11/2/19  Yes Mikayla Kirby MD   naproxen (NAPROSYN) 500 mg tablet Take 500 mg by mouth as needed. 10/30/19  Yes Provider, Historical   b complex vitamins (B COMPLEX 1) tablet Take 1 Tab by mouth daily. Yes Provider, Historical   TRESIBA FLEXTOUCH U-200 200 unit/mL (3 mL) inpn Inject 100 units in the morning--dispense 18 pens for 90 day supply--Dose change 11/1/19--updated med list--did not send prescription to the pharmacy 11/1/19  Yes Mikayla Kirby MD   rosuvastatin (CRESTOR) 20 mg tablet Take 1/2 tab daily--Dose change 1/17/19--updated med list--did not send prescription to the pharmacy 1/17/19  Yes Mikayla Kirby MD   Insulin Needles, Disposable, (BD ULTRA-FINE MINI PEN NEEDLE) 31 gauge x 3/16\" ndle Use as directed 3 times daily 10/30/18  Yes Mikayla Kirby MD   insulin syringe-needle U-100 1 mL 31 gauge x 15/64\" syrg 1 Syringe by SubCUTAneous route five (5) times daily. 7/2/18  Yes Mikayla Kirby MD   aspirin 81 mg chewable tablet Take 81 mg by mouth daily. Yes Provider, Historical   ONETOUCH ULTRA TEST strip Test up to 6 times daily. Dx: 250.03 4/2/15  Yes Mikayla Kirby MD   [facility]. 33 gauge misc Use as directed up to 6 times daily. Dx: 250.03 4/2/15  Yes Mikayla Kirby MD   cholecalciferol, vitamin D3, (VITAMIN D3) 2,000 unit Tab Take  by mouth daily. Yes Provider, Historical   MV-MN/FA/D3/LYCOPENE/LUT/COQ10 (DAILY MULTIVITAMIN PO) Take  by mouth.    Yes Provider, Historical   TRESIBA FLEXTOUCH U-200 200 unit/mL (3 mL) inpn INJECT 110  UNITS IN THE MORNING 11/2/19   Mikayla Kirby MD       REVIEW OF SYSTEMS:         Total of 12 systems reviewed as follows:       POSITIVE= underlined text  Negative = text not underlined  General:  fever, chills, sweats, generalized weakness, weight loss/gain,      loss of appetite   Eyes:    blurred vision, eye pain, loss of vision, double vision  ENT:    rhinorrhea, pharyngitis   Respiratory:   cough, sputum production, SOB, PETERSEN, wheezing, pleuritic pain   Cardiology:   chest pain, palpitations, orthopnea, PND, edema, syncope   Gastrointestinal:  abdominal pain , N/V, diarrhea, dysphagia, constipation, bleeding   Genitourinary:  frequency, urgency, dysuria, hematuria, incontinence   Muskuloskeletal :  arthralgia, myalgia, back pain  Hematology:  easy bruising, nose or gum bleeding, lymphadenopathy   Dermatological: rash, ulceration, pruritis, color change / jaundice  Endocrine:   hot flashes or polydipsia   Neurological:  headache, dizziness, confusion, focal weakness, paresthesia,     Speech difficulties, memory loss, gait difficulty  Psychological: Feelings of anxiety, depression, agitation    Objective:   VITALS:    Visit Vitals  /67 (BP 1 Location: Left arm, BP Patient Position: Sitting)   Pulse (!) 105   Temp 99 °F (37.2 °C)   Resp 18   SpO2 92%       PHYSICAL EXAM:    General:    Alert, cooperative, no distress, appears stated age. Obese +     HEENT: Atraumatic, anicteric sclerae, pink conjunctivae     No oral ulcers, mucosa moist, throat clear, dentition fair  Neck:  Supple, symmetrical,  thyroid: non tender  Lungs:   Coarse breath sounds in both lung field noted +. No rales. Chest wall:  No tenderness  No Accessory muscle use. Heart:   Regular  rhythm,  No  murmur   No edema  Abdomen:   Soft, non-tender. Not distended. Bowel sounds normal  Extremities: No cyanosis. No clubbing,      Skin turgor normal, Capillary refill normal, Radial dial pulse 2+  Skin:     Not pale. Not Jaundiced  No rashes   Psych:  Good insight. Not depressed. Not anxious or agitated. Neurologic: EOMs intact. No facial asymmetry. No aphasia or slurred speech.  Symmetrical strength, Sensation grossly intact. Alert and oriented X 4.     _______________________________________________________________________  Care Plan discussed with:    Comments   Patient x    Family  x  wife at bedside in ER   RN x    Care Manager                    Consultant:  mojgan ED MD [place]encil   _______________________________________________________________________  Expected  Disposition:   Home with Family x   HH/PT/OT/RN    SNF/LTC    MU    ________________________________________________________________________  TOTAL TIME:  64 Minutes    Critical Care Provided     Minutes non procedure based      Comments    x Reviewed previous records   >50% of visit spent in counseling and coordination of care x Discussion with patient and family and questions answered       ________________________________________________________________________  Signed: Uriel Singh MD    Procedures: see electronic medical records for all procedures/Xrays and details which were not copied into this note but were reviewed prior to creation of Plan. LAB DATA REVIEWED:    Recent Results (from the past 24 hour(s))   INFLUENZA A & B AG (RAPID TEST)    Collection Time: 11/29/19 11:00 AM   Result Value Ref Range    Influenza A Antigen NEGATIVE  NEG      Influenza B Antigen NEGATIVE  NEG     CBC WITH AUTOMATED DIFF    Collection Time: 11/29/19 12:43 PM   Result Value Ref Range    WBC 19.3 (H) 4.1 - 11.1 K/uL    RBC 4.17 4.10 - 5.70 M/uL    HGB 12.9 12.1 - 17.0 g/dL    HCT 38.9 36.6 - 50.3 %    MCV 93.3 80.0 - 99.0 FL    MCH 30.9 26.0 - 34.0 PG    MCHC 33.2 30.0 - 36.5 g/dL    RDW 12.5 11.5 - 14.5 %    PLATELET 997 363 - 896 K/uL    MPV 11.1 8.9 - 12.9 FL    NRBC 0.0 0  WBC    ABSOLUTE NRBC 0.00 0.00 - 0.01 K/uL    NEUTROPHILS 76 (H) 32 - 75 %    BAND NEUTROPHILS 13 %    LYMPHOCYTES 6 (L) 12 - 49 %    MONOCYTES 5 5 - 13 %    EOSINOPHILS 0 0 - 7 %    BASOPHILS 0 0 - 1 %    IMMATURE GRANULOCYTES 0 0.0 - 0.5 %    ABS.  NEUTROPHILS 17.1 (H) 1.8 - 8.0 K/UL    ABS. LYMPHOCYTES 1.2 0.8 - 3.5 K/UL    ABS. MONOCYTES 1.0 0.0 - 1.0 K/UL    ABS. EOSINOPHILS 0.0 0.0 - 0.4 K/UL    ABS. BASOPHILS 0.0 0.0 - 0.1 K/UL    ABS. IMM. GRANS. 0.0 0.00 - 0.04 K/UL    DF MANUAL      RBC COMMENTS NORMOCYTIC, NORMOCHROMIC     METABOLIC PANEL, COMPREHENSIVE    Collection Time: 11/29/19 12:43 PM   Result Value Ref Range    Sodium 138 136 - 145 mmol/L    Potassium 4.8 3.5 - 5.1 mmol/L    Chloride 102 97 - 108 mmol/L    CO2 27 21 - 32 mmol/L    Anion gap 9 5 - 15 mmol/L    Glucose 317 (H) 65 - 100 mg/dL    BUN 49 (H) 6 - 20 MG/DL    Creatinine 1.96 (H) 0.70 - 1.30 MG/DL    BUN/Creatinine ratio 25 (H) 12 - 20      GFR est AA 44 (L) >60 ml/min/1.73m2    GFR est non-AA 36 (L) >60 ml/min/1.73m2    Calcium 9.0 8.5 - 10.1 MG/DL    Bilirubin, total 0.6 0.2 - 1.0 MG/DL    ALT (SGPT) 17 12 - 78 U/L    AST (SGOT) 36 15 - 37 U/L    Alk.  phosphatase 103 45 - 117 U/L    Protein, total 7.4 6.4 - 8.2 g/dL    Albumin 3.3 (L) 3.5 - 5.0 g/dL    Globulin 4.1 (H) 2.0 - 4.0 g/dL    A-G Ratio 0.8 (L) 1.1 - 2.2     URINALYSIS W/ REFLEX CULTURE    Collection Time: 11/29/19 12:43 PM   Result Value Ref Range    Color YELLOW/STRAW      Appearance CLEAR CLEAR      Specific gravity 1.024 1.003 - 1.030      pH (UA) 5.0 5.0 - 8.0      Protein 30 (A) NEG mg/dL    Glucose >1,000 (A) NEG mg/dL    Ketone NEGATIVE  NEG mg/dL    Bilirubin NEGATIVE  NEG      Blood NEGATIVE  NEG      Urobilinogen 0.2 0.2 - 1.0 EU/dL    Nitrites NEGATIVE  NEG      Leukocyte Esterase NEGATIVE  NEG      WBC 0-4 0 - 4 /hpf    RBC 0-5 0 - 5 /hpf    Epithelial cells FEW FEW /lpf    Bacteria 1+ (A) NEG /hpf    UA:UC IF INDICATED URINE CULTURE ORDERED (A) CNI      Hyaline cast 5-10 0 - 5 /lpf   POC LACTIC ACID    Collection Time: 11/29/19  3:42 PM   Result Value Ref Range    Lactic Acid (POC) 2.15 (HH) 0.40 - 2.00 mmol/L

## 2019-11-29 NOTE — ED PROVIDER NOTES
EMERGENCY DEPARTMENT HISTORY AND PHYSICAL EXAM      Date: 11/29/2019  Patient Name: Aleshia Johnson    History of Presenting Illness     Chief Complaint   Patient presents with    Flu     The patient [facility] with [facility] like symptoms. History Provided By: Patient    HPI: Aleshia Johnson, 46 y.o. male with PMHx significant for diabetes, TIA, presents to the ED with cc of severe shortness of breath, productive cough, fevers, chills, and weakness over the last 24 to 48 hours. Patient reports 2 to 3 weeks of mild cough but symptoms began to worsen acutely 48 hours ago. He is unable to eat in the last 24 hours and feels severely short of breath. He was seen in a local urgent care and due to his hypoxia was sent to the ER for further evaluation. He reports multiple sick contacts with similar complaints including his wife. He has had no recent travel and no prior similar complaints. He has no other associated symptoms. No his other exacerbating or militating factors. No recent antibiotic use. There are no other complaints, changes, or physical findings at this time. PCP: Cedrick Ward MD    No current facility-administered medications on file prior to encounter. Current Outpatient Medications on File Prior to Encounter   Medication Sig Dispense Refill    DULoxetine (CYMBALTA) 30 mg capsule Take 1 Cap by mouth two (2) times a day. 180 Cap 3    hydroCHLOROthiazide (HYDRODIURIL) 25 mg tablet TAKE 1 TABLET BY MOUTH EVERY DAY 90 Tab 3    lisinopril (PRINIVIL, ZESTRIL) 40 mg tablet TAKE 1 TABLET DAILY 90 Tab 3    insulin aspart U-100 (NOVOLOG FLEXPEN U-100 INSULIN) 100 unit/mL (3 mL) inpn FOR DIRECTIONS ON HOW TO   TAKE THIS MEDICINE, READ   THE ENCLOSED MEDICATION    INFORMATION FORM 90 mL 3    amLODIPine (NORVASC) 10 mg tablet TAKE 1 TABLET DAILY 90 Tab 3    naproxen (NAPROSYN) 500 mg tablet Take 500 mg by mouth as needed.       b complex vitamins (B COMPLEX 1) tablet Take 1 Tab by mouth daily.  TRESIBA FLEXTOUCH U-200 200 unit/mL (3 mL) inpn Inject 100 units in the morning--dispense 18 pens for 90 day supply--Dose change 11/1/19--updated med list--did not send prescription to the pharmacy 54 mL 3    rosuvastatin (CRESTOR) 20 mg tablet Take 1/2 tab daily--Dose change 1/17/19--updated med list--did not send prescription to the pharmacy 90 Tab 3    Insulin Needles, Disposable, (BD ULTRA-FINE MINI PEN NEEDLE) 31 gauge x 3/16\" ndle Use as directed 3 times daily 300 Pen Needle 3    insulin syringe-needle U-100 1 mL 31 gauge x 15/64\" syrg 1 Syringe by SubCUTAneous route five (5) times daily. 200 Pen Needle 11    aspirin 81 mg chewable tablet Take 81 mg by mouth daily.  ONETOUCH ULTRA TEST strip Test up to 6 times daily. Dx: 250.03 200 Strip 11    ONE TOUCH DELICA 33 gauge misc Use as directed up to 6 times daily. Dx: 250.03 200 Package 11    cholecalciferol, vitamin D3, (VITAMIN D3) 2,000 unit Tab Take  by mouth daily.  MV-MN/FA/D3/LYCOPENE/LUT/COQ10 (DAILY MULTIVITAMIN PO) Take  by mouth.       TRESIBA FLEXTOUCH U-200 200 unit/mL (3 mL) inpn INJECT 110  UNITS IN THE MORNING 54 mL 3       Past History     Past Medical History:  Past Medical History:   Diagnosis Date    Diabetes ([facility] [address]     Other and unspecified hyperlipidemia     Stroke [facility])     TIA September 2011    TIA (transient ischemic attack) Sept 2011    Type I (juvenile type) diabetes mellitus without mention of complication, uncontrolled Age 8    Unspecified essential hypertension     Unspecified vitamin D deficiency 2/27/2012       Past Surgical History:  Past Surgical History:   Procedure Laterality Date    HX CATARACT REMOVAL Left 03/2019       Family History:  Family History   Problem Relation Age of Onset    Cancer Father         Bone cancer    Heart Attack Maternal Grandmother     Heart Attack Maternal Grandfather     Stroke Maternal Grandfather     Heart Attack Paternal Grandmother     Heart Attack Paternal Grandfather     Diabetes Other         2 nieces with Type 1 diabetes    Heart Disease Neg Hx        Social History:  Social History     Tobacco Use    Smoking status: Never Smoker    Smokeless tobacco: Never Used   Substance Use Topics    Alcohol use: Yes     Alcohol/week: 1.0 standard drinks     Types: 1 Cans of beer per week     Comment: Rare beer    Drug use: No       Allergies:  No Known Allergies      Review of Systems   Review of Systems   Constitutional: Positive for chills and fever. Negative for diaphoresis and fatigue. HENT: Negative for ear pain and sore throat. Eyes: Negative for pain and redness. Respiratory: Positive for cough and shortness of breath. Cardiovascular: Negative for chest pain and leg swelling. Gastrointestinal: Negative for abdominal pain, diarrhea, nausea and vomiting. Endocrine: Negative for cold intolerance and heat intolerance. Genitourinary: Negative for flank pain and hematuria. Musculoskeletal: Negative for back pain and neck stiffness. Skin: Negative for rash and wound. Neurological: Negative for dizziness, syncope and headaches. All other systems reviewed and are negative. Physical Exam   Physical Exam  Vitals signs and nursing note reviewed. Constitutional:       Appearance: He is well-developed. Comments: Moderate distress secondary to respiratory distress. Actively coughing. HENT:      Head: Normocephalic and atraumatic. Mouth/Throat:      Pharynx: No oropharyngeal exudate. Eyes:      Conjunctiva/sclera: Conjunctivae normal.      Pupils: Pupils are equal, round, and reactive to light. Neck:      Musculoskeletal: Normal range of motion. Cardiovascular:      Rate and Rhythm: Normal rate and regular rhythm. Heart sounds: No murmur. Pulmonary:      Effort: Pulmonary effort is normal. No respiratory distress. Breath sounds: Normal breath sounds. No wheezing. Comments: Severe wheezing with both inspiration and expiration in all lung fields. Crackles at both bases. Mild sensory muscle use and mild tachypnea with respiratory rate of 22 breaths/min. Speaking in 3-4 word sentences. Abdominal:      General: Bowel sounds are normal. There is no distension. Palpations: Abdomen is soft. Tenderness: There is no tenderness. Musculoskeletal: Normal range of motion. General: No deformity. Skin:     General: Skin is warm and dry. Findings: No rash. Neurological:      Mental Status: He is alert and oriented to person, place, and time. Coordination: Coordination normal.   Psychiatric:         Behavior: Behavior normal.         Diagnostic Study Results     Labs -     Recent Results (from the past 24 hour(s))   INFLUENZA A & B AG (RAPID TEST)    Collection Time: 11/29/19 11:00 AM   Result Value Ref Range    Influenza A Antigen NEGATIVE  NEG      Influenza B Antigen NEGATIVE  NEG     CBC WITH AUTOMATED DIFF    Collection Time: 11/29/19 12:43 PM   Result Value Ref Range    WBC 19.3 (H) 4.1 - 11.1 K/uL    RBC 4.17 4.10 - 5.70 M/uL    HGB 12.9 12.1 - 17.0 g/dL    HCT 38.9 36.6 - 50.3 %    MCV 93.3 80.0 - 99.0 FL    MCH 30.9 26.0 - 34.0 PG    MCHC 33.2 30.0 - 36.5 g/dL    RDW 12.5 11.5 - 14.5 %    PLATELET 025 119 - 112 K/uL    MPV 11.1 8.9 - 12.9 FL    NRBC 0.0 0  WBC    ABSOLUTE NRBC 0.00 0.00 - 0.01 K/uL    NEUTROPHILS 76 (H) 32 - 75 %    BAND NEUTROPHILS 13 %    LYMPHOCYTES 6 (L) 12 - 49 %    MONOCYTES 5 5 - 13 %    EOSINOPHILS 0 0 - 7 %    BASOPHILS 0 0 - 1 %    IMMATURE GRANULOCYTES 0 0.0 - 0.5 %    ABS. NEUTROPHILS 17.1 (H) 1.8 - 8.0 K/UL    ABS. LYMPHOCYTES 1.2 0.8 - 3.5 K/UL    ABS. MONOCYTES 1.0 0.0 - 1.0 K/UL    ABS. EOSINOPHILS 0.0 0.0 - 0.4 K/UL    ABS. BASOPHILS 0.0 0.0 - 0.1 K/UL    ABS. IMM.  GRANS. 0.0 0.00 - 0.04 K/UL    DF MANUAL      RBC COMMENTS NORMOCYTIC, NORMOCHROMIC     METABOLIC PANEL, COMPREHENSIVE    Collection Time: 11/29/19 12:43 PM   Result Value Ref Range    Sodium 138 136 - 145 mmol/L    Potassium 4.8 3.5 - 5.1 mmol/L    Chloride 102 97 - 108 mmol/L    CO2 27 21 - 32 mmol/L    Anion gap 9 5 - 15 mmol/L    Glucose 317 (H) 65 - 100 mg/dL    BUN 49 (H) 6 - 20 MG/DL    Creatinine 1.96 (H) 0.70 - 1.30 MG/DL    BUN/Creatinine ratio 25 (H) 12 - 20      GFR est AA 44 (L) >60 ml/min/1.73m2    GFR est non-AA 36 (L) >60 ml/min/1.73m2    Calcium 9.0 8.5 - 10.1 MG/DL    Bilirubin, total 0.6 0.2 - 1.0 MG/DL    ALT (SGPT) 17 12 - 78 U/L    AST (SGOT) 36 15 - 37 U/L    Alk.  phosphatase 103 45 - 117 U/L    Protein, total 7.4 6.4 - 8.2 g/dL    Albumin 3.3 (L) 3.5 - 5.0 g/dL    Globulin 4.1 (H) 2.0 - 4.0 g/dL    A-G Ratio 0.8 (L) 1.1 - 2.2     URINALYSIS W/ REFLEX CULTURE    Collection Time: 11/29/19 12:43 PM   Result Value Ref Range    Color YELLOW/STRAW      Appearance CLEAR CLEAR      Specific gravity 1.024 1.003 - 1.030      pH (UA) 5.0 5.0 - 8.0      Protein 30 (A) NEG mg/dL    Glucose >1,000 (A) NEG mg/dL    Ketone NEGATIVE  NEG mg/dL    Bilirubin NEGATIVE  NEG      Blood NEGATIVE  NEG      Urobilinogen 0.2 0.2 - 1.0 EU/dL    Nitrites NEGATIVE  NEG      Leukocyte Esterase NEGATIVE  NEG      WBC 0-4 0 - 4 /hpf    RBC 0-5 0 - 5 /hpf    Epithelial cells FEW FEW /lpf    Bacteria 1+ (A) NEG /hpf    UA:UC IF INDICATED URINE CULTURE ORDERED (A) CNI      Hyaline cast 5-10 0 - 5 /lpf   POC LACTIC ACID    Collection Time: 11/29/19  3:42 PM   Result Value Ref Range    Lactic Acid (POC) 2.15 (HH) 0.40 - 2.00 mmol/L       Radiologic Studies -   XR CHEST PA LAT   Final Result   IMPRESSION:       Bronchitis with bilateral lower lobe pneumonia or alveolitis        CT Results  (Last 48 hours)    None        CXR Results  (Last 48 hours)               11/29/19 1406  XR CHEST PA LAT Final result    Impression:  IMPRESSION:        Bronchitis with bilateral lower lobe pneumonia or alveolitis       Narrative:  EXAM: XR CHEST PA LAT       INDICATION: cough COMPARISON: 1/12/2016. FINDINGS: PA and lateral radiographs of the chest demonstrate peribronchial   cuffing with vague subcentimeter opacities in both lower lobes. There is   thickening of the posterior tracheal surface. The cardiac and mediastinal   contours and pulmonary vascularity are stable. Heavy costochondral calcification   is seen                   Medical Decision Making   I am the first provider for this patient. I reviewed the vital signs, available nursing notes, past medical history, past surgical history, family history and social history. Vital Signs-Reviewed the patient's vital signs. Patient Vitals for the past 24 hrs:   Temp Pulse Resp BP SpO2   11/29/19 1436     92 %   11/29/19 1433     (!) 85 %   11/29/19 1053 99 °F (37.2 °C) (!) 105 18 125/67 95 %       Pulse Oximetry Analysis -85 % on room air    Cardiac Monitor:   Rate: 105 bpm  Rhythm: Sinus Tachycardia        Records Reviewed: Nursing Notes and Old Medical Records    Differential Diagnosis:    Patient presents with acute dyspnea. DDx: asthma, copd, pna, pulmonary edema, acute bronchitis, ACS, ptx, pna. Will obtain EKG, labs, CXR, provide O2 as needed for hypoxia, treat symptomatically and reassess. Will continue to monitor closely in ED. Provider Notes (Medical Decision Making):   Patient seen and evaluated. Patient meets criteria for severe sepsis based on his elevated lactic acid level. Patient received IV fluids and broad-spectrum antibiotics. Patient has room air hypoxia so that we will admit for IV antibiotics, fluid resuscitation, and supplemental oxygen. Serial nebulizers appear to improve his breathing. ED Course:     Initial assessment performed. The patients presenting problems have been discussed, and they are in agreement with the care plan formulated and outlined with them. I have encouraged them to ask questions as they arise throughout their visit.          Critical Care Time:     CRITICAL CARE NOTE :    4:29 PM      IMPENDING DETERIORATION -Respiratory and Cardiovascular    ASSOCIATED RISK FACTORS - Hypotension and Shock    MANAGEMENT- Bedside Assessment and Supervision of Care    INTERPRETATION -  Xrays     INTERVENTIONS - hemodynamic mngmt    CASE REVIEW - Hospitalist    TREATMENT RESPONSE -Improved    PERFORMED BY - Self        NOTES   :      I have spent 35 minutes of critical care time involved in lab review, consultations with specialist, family decision- making, bedside attention and documentation. During this entire length of time I was immediately available to the patient . Mary Mendez MD      Disposition:  Admit Note:  4:30 PM  Pt is being admitted by hospitalist. The results of their tests and reason(s) for their admission have been discussed with pt and/or available family. They convey agreement and understanding for the need to be admitted and for admission diagnosis. PLAN:  1. Current Discharge Medication List        2. Follow-up Information    None       Return to ED if worse     Diagnosis     Clinical Impression:   1.  Pneumonia of both lungs due to infectious organism, unspecified part of lung

## 2019-11-30 LAB
ANION GAP SERPL CALC-SCNC: 7 MMOL/L (ref 5–15)
BUN SERPL-MCNC: 48 MG/DL (ref 6–20)
BUN/CREAT SERPL: 32 (ref 12–20)
CALCIUM SERPL-MCNC: 8.1 MG/DL (ref 8.5–10.1)
CHLORIDE SERPL-SCNC: 105 MMOL/L (ref 97–108)
CO2 SERPL-SCNC: 23 MMOL/L (ref 21–32)
CREAT SERPL-MCNC: 1.5 MG/DL (ref 0.7–1.3)
ERYTHROCYTE [DISTWIDTH] IN BLOOD BY AUTOMATED COUNT: 12.4 % (ref 11.5–14.5)
GLUCOSE BLD STRIP.AUTO-MCNC: 104 MG/DL (ref 65–100)
GLUCOSE BLD STRIP.AUTO-MCNC: 126 MG/DL (ref 65–100)
GLUCOSE BLD STRIP.AUTO-MCNC: 157 MG/DL (ref 65–100)
GLUCOSE BLD STRIP.AUTO-MCNC: 283 MG/DL (ref 65–100)
GLUCOSE BLD STRIP.AUTO-MCNC: 333 MG/DL (ref 65–100)
GLUCOSE BLD STRIP.AUTO-MCNC: 364 MG/DL (ref 65–100)
GLUCOSE BLD STRIP.AUTO-MCNC: 380 MG/DL (ref 65–100)
GLUCOSE BLD STRIP.AUTO-MCNC: 393 MG/DL (ref 65–100)
GLUCOSE BLD STRIP.AUTO-MCNC: 431 MG/DL (ref 65–100)
GLUCOSE BLD STRIP.AUTO-MCNC: 48 MG/DL (ref 65–100)
GLUCOSE BLD STRIP.AUTO-MCNC: 49 MG/DL (ref 65–100)
GLUCOSE BLD STRIP.AUTO-MCNC: 75 MG/DL (ref 65–100)
GLUCOSE SERPL-MCNC: 382 MG/DL (ref 65–100)
HCT VFR BLD AUTO: 35.7 % (ref 36.6–50.3)
HGB BLD-MCNC: 11.7 G/DL (ref 12.1–17)
LACTATE SERPL-SCNC: 1.2 MMOL/L (ref 0.4–2)
LACTATE SERPL-SCNC: 2 MMOL/L (ref 0.4–2)
MCH RBC QN AUTO: 30.9 PG (ref 26–34)
MCHC RBC AUTO-ENTMCNC: 32.8 G/DL (ref 30–36.5)
MCV RBC AUTO: 94.2 FL (ref 80–99)
NRBC # BLD: 0 K/UL (ref 0–0.01)
NRBC BLD-RTO: 0 PER 100 WBC
PLATELET # BLD AUTO: 270 K/UL (ref 150–400)
PMV BLD AUTO: 11.4 FL (ref 8.9–12.9)
POTASSIUM SERPL-SCNC: 4.7 MMOL/L (ref 3.5–5.1)
RBC # BLD AUTO: 3.79 M/UL (ref 4.1–5.7)
SERVICE CMNT-IMP: ABNORMAL
SERVICE CMNT-IMP: NORMAL
SODIUM SERPL-SCNC: 135 MMOL/L (ref 136–145)
WBC # BLD AUTO: 19.7 K/UL (ref 4.1–11.1)

## 2019-11-30 PROCEDURE — 74011250637 HC RX REV CODE- 250/637: Performed by: INTERNAL MEDICINE

## 2019-11-30 PROCEDURE — 82962 GLUCOSE BLOOD TEST: CPT

## 2019-11-30 PROCEDURE — 80048 BASIC METABOLIC PNL TOTAL CA: CPT

## 2019-11-30 PROCEDURE — 94640 AIRWAY INHALATION TREATMENT: CPT

## 2019-11-30 PROCEDURE — 36415 COLL VENOUS BLD VENIPUNCTURE: CPT

## 2019-11-30 PROCEDURE — 74011636637 HC RX REV CODE- 636/637: Performed by: INTERNAL MEDICINE

## 2019-11-30 PROCEDURE — 74011000258 HC RX REV CODE- 258: Performed by: INTERNAL MEDICINE

## 2019-11-30 PROCEDURE — 74011250636 HC RX REV CODE- 250/636: Performed by: INTERNAL MEDICINE

## 2019-11-30 PROCEDURE — 65660000000 HC RM CCU STEPDOWN

## 2019-11-30 PROCEDURE — 83605 ASSAY OF LACTIC ACID: CPT

## 2019-11-30 PROCEDURE — 77010033711 HC HIGH FLOW OXYGEN

## 2019-11-30 PROCEDURE — 74011636637 HC RX REV CODE- 636/637: Performed by: HOSPITALIST

## 2019-11-30 PROCEDURE — 74011000250 HC RX REV CODE- 250: Performed by: INTERNAL MEDICINE

## 2019-11-30 PROCEDURE — 94760 N-INVAS EAR/PLS OXIMETRY 1: CPT

## 2019-11-30 PROCEDURE — 85027 COMPLETE CBC AUTOMATED: CPT

## 2019-11-30 PROCEDURE — 74011636637 HC RX REV CODE- 636/637: Performed by: FAMILY MEDICINE

## 2019-11-30 RX ORDER — LEVALBUTEROL INHALATION SOLUTION 0.63 MG/3ML
0.63 SOLUTION RESPIRATORY (INHALATION)
Status: DISCONTINUED | OUTPATIENT
Start: 2019-11-30 | End: 2019-12-03

## 2019-11-30 RX ORDER — INSULIN LISPRO 100 [IU]/ML
INJECTION, SOLUTION INTRAVENOUS; SUBCUTANEOUS EVERY 4 HOURS
Status: DISCONTINUED | OUTPATIENT
Start: 2019-11-30 | End: 2019-12-04

## 2019-11-30 RX ORDER — INSULIN GLARGINE 100 [IU]/ML
50 INJECTION, SOLUTION SUBCUTANEOUS DAILY
Status: DISCONTINUED | OUTPATIENT
Start: 2019-12-01 | End: 2019-12-02

## 2019-11-30 RX ORDER — DEXTROSE 50 % IN WATER (D50W) INTRAVENOUS SYRINGE
12.5-25 AS NEEDED
Status: DISCONTINUED | OUTPATIENT
Start: 2019-11-30 | End: 2019-12-08 | Stop reason: HOSPADM

## 2019-11-30 RX ORDER — INSULIN GLARGINE 100 [IU]/ML
50 INJECTION, SOLUTION SUBCUTANEOUS DAILY
Status: DISCONTINUED | OUTPATIENT
Start: 2019-12-01 | End: 2019-11-30 | Stop reason: SDUPTHER

## 2019-11-30 RX ORDER — INSULIN LISPRO 100 [IU]/ML
10 INJECTION, SOLUTION INTRAVENOUS; SUBCUTANEOUS ONCE
Status: COMPLETED | OUTPATIENT
Start: 2019-11-30 | End: 2019-11-30

## 2019-11-30 RX ORDER — METOPROLOL TARTRATE 25 MG/1
12.5 TABLET, FILM COATED ORAL 2 TIMES DAILY
Status: DISCONTINUED | OUTPATIENT
Start: 2019-11-30 | End: 2019-12-05

## 2019-11-30 RX ORDER — ZOLPIDEM TARTRATE 5 MG/1
5 TABLET ORAL
Status: DISCONTINUED | OUTPATIENT
Start: 2019-11-30 | End: 2019-12-08 | Stop reason: HOSPADM

## 2019-11-30 RX ORDER — MAGNESIUM SULFATE 100 %
4 CRYSTALS MISCELLANEOUS AS NEEDED
Status: DISCONTINUED | OUTPATIENT
Start: 2019-11-30 | End: 2019-12-08 | Stop reason: HOSPADM

## 2019-11-30 RX ORDER — INSULIN LISPRO 100 [IU]/ML
16 INJECTION, SOLUTION INTRAVENOUS; SUBCUTANEOUS
Status: DISCONTINUED | OUTPATIENT
Start: 2019-11-30 | End: 2019-12-02

## 2019-11-30 RX ADMIN — INSULIN GLARGINE 45 UNITS: 100 INJECTION, SOLUTION SUBCUTANEOUS at 09:01

## 2019-11-30 RX ADMIN — ONDANSETRON 4 MG: 2 INJECTION INTRAMUSCULAR; INTRAVENOUS at 18:52

## 2019-11-30 RX ADMIN — HEPARIN SODIUM 5000 UNITS: 5000 INJECTION INTRAVENOUS; SUBCUTANEOUS at 12:08

## 2019-11-30 RX ADMIN — SODIUM CHLORIDE 125 ML/HR: 900 INJECTION, SOLUTION INTRAVENOUS at 02:46

## 2019-11-30 RX ADMIN — HEPARIN SODIUM 5000 UNITS: 5000 INJECTION INTRAVENOUS; SUBCUTANEOUS at 03:49

## 2019-11-30 RX ADMIN — METHYLPREDNISOLONE SODIUM SUCCINATE 40 MG: 125 INJECTION, POWDER, FOR SOLUTION INTRAMUSCULAR; INTRAVENOUS at 17:19

## 2019-11-30 RX ADMIN — INSULIN LISPRO 10 UNITS: 100 INJECTION, SOLUTION INTRAVENOUS; SUBCUTANEOUS at 00:53

## 2019-11-30 RX ADMIN — SODIUM CHLORIDE 125 ML/HR: 900 INJECTION, SOLUTION INTRAVENOUS at 20:21

## 2019-11-30 RX ADMIN — LEVALBUTEROL HYDROCHLORIDE 0.63 MG: 0.63 SOLUTION RESPIRATORY (INHALATION) at 14:29

## 2019-11-30 RX ADMIN — DULOXETINE 30 MG: 30 CAPSULE, DELAYED RELEASE ORAL at 17:20

## 2019-11-30 RX ADMIN — GUAIFENESIN AND DEXTROMETHORPHAN 10 ML: 100; 10 SYRUP ORAL at 17:18

## 2019-11-30 RX ADMIN — DULOXETINE 30 MG: 30 CAPSULE, DELAYED RELEASE ORAL at 09:03

## 2019-11-30 RX ADMIN — INSULIN LISPRO 2 UNITS: 100 INJECTION, SOLUTION INTRAVENOUS; SUBCUTANEOUS at 09:03

## 2019-11-30 RX ADMIN — CEFTRIAXONE SODIUM 2 G: 2 INJECTION, POWDER, FOR SOLUTION INTRAMUSCULAR; INTRAVENOUS at 14:37

## 2019-11-30 RX ADMIN — INSULIN GLARGINE 45 UNITS: 100 INJECTION, SOLUTION SUBCUTANEOUS at 09:02

## 2019-11-30 RX ADMIN — ASPIRIN 81 MG 81 MG: 81 TABLET ORAL at 09:03

## 2019-11-30 RX ADMIN — METOPROLOL TARTRATE 12.5 MG: 25 TABLET ORAL at 17:20

## 2019-11-30 RX ADMIN — GUAIFENESIN 600 MG: 600 TABLET, EXTENDED RELEASE ORAL at 09:03

## 2019-11-30 RX ADMIN — HEPARIN SODIUM 5000 UNITS: 5000 INJECTION INTRAVENOUS; SUBCUTANEOUS at 20:32

## 2019-11-30 RX ADMIN — SODIUM CHLORIDE 125 ML/HR: 900 INJECTION, SOLUTION INTRAVENOUS at 11:17

## 2019-11-30 RX ADMIN — HUMAN INSULIN 35 UNITS: 100 INJECTION, SUSPENSION SUBCUTANEOUS at 17:20

## 2019-11-30 RX ADMIN — INSULIN LISPRO 10 UNITS: 100 INJECTION, SOLUTION INTRAVENOUS; SUBCUTANEOUS at 09:02

## 2019-11-30 RX ADMIN — INSULIN LISPRO 10 UNITS: 100 INJECTION, SOLUTION INTRAVENOUS; SUBCUTANEOUS at 12:09

## 2019-11-30 RX ADMIN — AZITHROMYCIN MONOHYDRATE 500 MG: 500 INJECTION, POWDER, LYOPHILIZED, FOR SOLUTION INTRAVENOUS at 17:16

## 2019-11-30 RX ADMIN — INSULIN LISPRO 10 UNITS: 100 INJECTION, SOLUTION INTRAVENOUS; SUBCUTANEOUS at 05:08

## 2019-11-30 RX ADMIN — INSULIN LISPRO 4 UNITS: 100 INJECTION, SOLUTION INTRAVENOUS; SUBCUTANEOUS at 12:08

## 2019-11-30 RX ADMIN — GUAIFENESIN 600 MG: 600 TABLET, EXTENDED RELEASE ORAL at 20:33

## 2019-11-30 RX ADMIN — INSULIN LISPRO 7 UNITS: 100 INJECTION, SOLUTION INTRAVENOUS; SUBCUTANEOUS at 14:34

## 2019-11-30 RX ADMIN — Medication 10 ML: at 21:16

## 2019-11-30 RX ADMIN — METHYLPREDNISOLONE SODIUM SUCCINATE 40 MG: 125 INJECTION, POWDER, FOR SOLUTION INTRAMUSCULAR; INTRAVENOUS at 21:16

## 2019-11-30 RX ADMIN — Medication 10 ML: at 03:51

## 2019-11-30 RX ADMIN — Medication 10 ML: at 13:49

## 2019-11-30 RX ADMIN — INSULIN LISPRO 3 UNITS: 100 INJECTION, SOLUTION INTRAVENOUS; SUBCUTANEOUS at 17:20

## 2019-11-30 RX ADMIN — Medication 1 CAPSULE: at 09:03

## 2019-11-30 RX ADMIN — LEVALBUTEROL HYDROCHLORIDE 0.63 MG: 0.63 SOLUTION RESPIRATORY (INHALATION) at 21:19

## 2019-11-30 RX ADMIN — INSULIN LISPRO 16 UNITS: 100 INJECTION, SOLUTION INTRAVENOUS; SUBCUTANEOUS at 17:19

## 2019-11-30 NOTE — PROGRESS NOTES
Spoke with Dr. Viet Bryant regarding glucose of 364. Instructed to give 4 units of correctional insulin.

## 2019-11-30 NOTE — PROGRESS NOTES
Patient B.S 431, Humalog 10 units ordered, recheck B.S in an hour. Continue to monitor. Patient had received lantus 35 units and Humalog SSC, 3 hours prior when B.S was 331.

## 2019-11-30 NOTE — PROGRESS NOTES
Bedside and Verbal shift change report given to Keith Ferreira RN (oncoming nurse) by Arias Avelar RN (offgoing nurse). Report included the following information SBAR, Kardex, Intake/Output, MAR, Accordion and Recent Results.

## 2019-11-30 NOTE — PROGRESS NOTES
TRANSFER - IN REPORT:    Verbal report received from [facility]) on Nikki Oscar  being received from ER(unit) for routine progression of care      Report consisted of patients Situation, Background, Assessment and   Recommendations(SBAR). Information from the following report(s) SBAR, Kardex, Recent Results, Med Rec Status and Cardiac Rhythm SR was reviewed with the receiving nurse. Opportunity for questions and clarification was provided.       Assessment completed upon patients arrival to unit and care assumed.           ]

## 2019-11-30 NOTE — PROGRESS NOTES
ADULT PROTOCOL: JET AEROSOL ASSESSMENT    Patient  Kathleen Rice     46 y.o.   male     11/30/2019  2:35 PM    Breath Sounds Pre Procedure: Right Breath Sounds: Coarse, Wheezing                               Left Breath Sounds: Coarse, Wheezing    Breath Sounds Post Procedure: Right Breath Sounds: Coarse, Wheezing                                 Left Breath Sounds: Coarse, Wheezing    Breathing pattern: Pre procedure Breathing Pattern: Regular          Post procedure Breathing Pattern: Regular    Heart Rate: Pre procedure Pulse: 106           Post procedure      Resp Rate: Pre procedure Respirations: 20           Post procedure Respirations: 20    Oxygen: Room Air  Changed: Yes/from 4L/NC SATs 97%    SpO2: Pre procedure SpO2: 97 %                 Post procedure SpO2: 97 %      Nebulizer Therapy: Current medications Aerosolized Medications: Xopenex      Changed:NO        Problem List:   Patient Active Problem List   Diagnosis Code    Unspecified cerebral artery occlusion with cerebral infarction I63.50    Type I diabetes mellitus, uncontrolled ([facility] [address] E10.65    Essential hypertension, benign I10    Hyperlipidemia LDL goal <70 E78.5    ED (erectile dysfunction) N52.9    Vitamin D deficiency E55.9    Type 2 diabetes with nephropathy (HCC) E11.21    Severe obesity (HCC) E66.01    Bilateral pneumonia J18.9    Severe sepsis (HCC) A41.9, R65.20    GABY (acute kidney injury) ([facility] [address] N17.9       Respiratory Therapist: Mario Alberto Grewal RT

## 2019-11-30 NOTE — PROGRESS NOTES
Spoke to DR Christo Bridges because pt blood sugar was 331, MD stated to be sure he eats something and give him the 10 units of lispro, he also order 30 units of lantus now

## 2019-11-30 NOTE — PROGRESS NOTES
Problem: Patient Education: Go to Patient Education Activity  Goal: Patient/Family Education  Outcome: Progressing Towards Goal     Problem: Pneumonia: Day 2  Goal: Activity/Safety  Outcome: Progressing Towards Goal  Goal: Consults, if ordered  Outcome: Progressing Towards Goal  Goal: Diagnostic Test/Procedures  Outcome: Progressing Towards Goal  Goal: Nutrition/Diet  Outcome: Progressing Towards Goal  Goal: Discharge Planning  Outcome: Progressing Towards Goal  Goal: Medications  Outcome: Progressing Towards Goal  Goal: Respiratory  Outcome: Progressing Towards Goal  Goal: Treatments/Interventions/Procedures  Outcome: Progressing Towards Goal  Goal: Psychosocial  Outcome: Progressing Towards Goal  Goal: *Oxygen saturation within defined limits  Outcome: Progressing Towards Goal  Goal: *Hemodynamically stable  Outcome: Progressing Towards Goal  Goal: *Demonstrates progressive activity  Outcome: Progressing Towards Goal  Goal: *Tolerating diet  Outcome: Progressing Towards Goal  Goal: *Optimal pain control at patient's stated goal  Outcome: Progressing Towards Goal     Problem: Pneumonia: Day 3  Goal: Off Pathway (Use only if patient is Off Pathway)  Outcome: Progressing Towards Goal  Goal: Activity/Safety  Outcome: Progressing Towards Goal  Goal: Consults, if ordered  Outcome: Progressing Towards Goal  Goal: Diagnostic Test/Procedures  Outcome: Progressing Towards Goal  Goal: Nutrition/Diet  Outcome: Progressing Towards Goal  Goal: Discharge Planning  Outcome: Progressing Towards Goal  Goal: Medications  Outcome: Progressing Towards Goal  Goal: Respiratory  Outcome: Progressing Towards Goal  Goal: Treatments/Interventions/Procedures  Outcome: Progressing Towards Goal  Goal: Psychosocial  Outcome: Progressing Towards Goal  Goal: *Oxygen saturation within defined limits  Outcome: Progressing Towards Goal  Goal: *Hemodynamically stable  Outcome: Progressing Towards Goal  Goal: *Demonstrates progressive activity  Outcome: Progressing Towards Goal  Goal: *Tolerating diet  Outcome: Progressing Towards Goal  Goal: *Describes available resources and support systems  Outcome: Progressing Towards Goal  Goal: *Optimal pain control at patient's stated goal  Outcome: Progressing Towards Goal     Problem: Falls - Risk of  Goal: *Absence of Falls  Description  Document Chioma Pineda Fall Risk and appropriate interventions in the flowsheet.   Outcome: Progressing Towards Goal  Note: Fall Risk Interventions:            Medication Interventions: Teach patient to arise slowly                   Problem: Patient Education: Go to Patient Education Activity  Goal: Patient/Family Education  Outcome: Progressing Towards Goal

## 2019-11-30 NOTE — PROGRESS NOTES
* No surgery found *  * No surgery found *  Bedside and Verbal shift change report given to [address] [place] [address] (oncoming nurse) by Teri Powell RN (offgoing nurse). Report included the following information SBAR, Kardex, Recent Results, Med Rec Status and Cardiac Rhythm SR. Zone Phone:   [phone]      Significant changes during shift:  Admission        Patient Information    Thi Julian  46 y.o.  11/29/2019  1:32 PM by Kendy Qiu MD. Thi Julian was admitted from Home    Problem List    Patient Active Problem List    Diagnosis Date Noted    Bilateral pneumonia 11/29/2019    Severe sepsis ([address] 11/29/2019    GABY (acute kidney injury) ([address] 11/29/2019    Severe obesity ([address] 10/30/2018    Type 2 diabetes with nephropathy ([address] 03/30/2018    Vitamin D deficiency 02/27/2012    ED (erectile dysfunction) 01/09/2012    Type I diabetes mellitus, uncontrolled ([address] 09/08/2011    Essential hypertension, benign 09/08/2011    Hyperlipidemia LDL goal <70 09/08/2011    Unspecified cerebral artery occlusion with cerebral infarction 09/06/2011     Past Medical History:   Diagnosis Date    Diabetes ([address]     Other and unspecified hyperlipidemia     Stroke [facility])     TIA September 2011    TIA (transient ischemic attack) Sept 2011    Type I (juvenile type) diabetes mellitus without mention of complication, uncontrolled Age 8    Unspecified essential hypertension     Unspecified vitamin D deficiency 2/27/2012         Core Measures:    CVA: No No  CHF:No No  PNA:Yes Yes    Post Op Surgical (If Applicable):     Number times ambulated in hallway past shift:  0  Number of times OOB to chair past shift:   0  NG Tube: No  Incentive Spirometer: No  Drains: No   Volume  0  Dressing Present:  No  Flatus:  Not applicable    Activity Status:    OOB to Chair No  Ambulated this shift Yes   Bed Rest No    Supplemental O2: (If Applicable)    NC No  NRB No  Venti-mask No  On 0 Liters/min      LINES AND DRAINS:    Central Line?  No   PICC LINE? No   Urinary Catheter? No  DVT prophylaxis:    DVT prophylaxis Med- yes  DVT prophylaxis SCD or WALESKA- No     Wounds: (If Applicable)    Wounds- No    Location 0    Patient Safety:    Falls Score    Safety Level_______  Bed Alarm On? No  Sitter?  Not applicable    Plan for upcoming shift: IVF, IV antibitics        Discharge Plan: Yes TBD    Active Consults:  None

## 2019-11-30 NOTE — PROGRESS NOTES
Hospitalist Progress Note    NAME: Marla Goodman   :  1970   MRN:  [id number]       Assessment / Plan:  Pneumonia with Bronchitis and Bronchospasm causing Acute Respiratory Failure with Hypoxia (all POA)  Severe Sepsis from above  -admit CXR read as: \"Bronchitis with bilateral lower lobe pneumonia or alveolitis\"  -continue on Rocephin and Azithromycin  -await Cultures  -start solumedrol  -scheduled Nebs  -wean O2 as tolerated    Diabetes, Type II: uncontrolled in patient with hyperglycemia: insulin regimen modified as follows today  -Lantus 100 units daily (this is the hospital equivalent for patient's home [place])   -NPH 35 units q6h linked with solumedrol  -Humalog 16 units TIDAC ordered  -Sliding scale (resistant coverage); q4h glucose checks ordered     GABY: improving  -continue IVFs    Obesity: BMI 36.73     Code Status: Full code  Surrogate Decision Maker: Wife Mariaelena May      DVT Prophylaxis: SQ heparin  GI Prophylaxis: not indicated     Baseline: Patient is ambulatory at baseline     Subjective:     Chief Complaint / Reason for Physician Visit: follow-up pneumonia  Patient seen for follow-up  Feeling better today  Taking good PO    Review of Systems:  Symptom Y/N Comments  Symptom Y/N Comments   Fever/Chills y Low grade  Chest Pain n    Poor Appetite    Edema     Cough y   Abdominal Pain n    Sputum y   Joint Pain     SOB/PETERSEN y   Pruritis/Rash     Nausea/vomit n   Tolerating PT/OT     Diarrhea n   Tolerating Diet     Constipation    Other       Could NOT obtain due to:      Objective:     VITALS:   Last 24hrs VS reviewed since prior progress note.  Most recent are:  Patient Vitals for the past 24 hrs:   Temp Pulse Resp BP SpO2   19 1151 97.7 °F (36.5 °C) 95 20 157/77 97 %   19 0723 98 °F (36.7 °C) 95 20 151/78 97 %   19 0344 97.9 °F (36.6 °C) 92 20 141/71 95 %   19 0000 98.1 °F (36.7 °C) 96 22 120/63 95 %   19 1915 97.6 °F (36.4 °C) 99 22 126/57 96 % 11/29/19 1815  98 24 120/58 93 %   11/29/19 1758  100 26  91 %   11/29/19 1757  (!) 101 29  92 %   11/29/19 1756  (!) 101 23  93 %   11/29/19 1755  100 21  (!) 89 %   11/29/19 1754  (!) 101 28  (!) 89 %   11/29/19 1745  (!) 104 18 106/70 93 %   11/29/19 1703 100 °F (37.8 °C) (!) 104 24 130/61 93 %   11/29/19 1648  (!) 108 23  93 %   11/29/19 1436     92 %   11/29/19 1433     (!) 85 %       Intake/Output Summary (Last 24 hours) at 11/30/2019 1246  Last data filed at 11/29/2019 2250  Gross per 24 hour   Intake 2153.33 ml   Output    Net 2153.33 ml        PHYSICAL EXAM:  General: WD, WN. Alert, cooperative, no acute distress    EENT:  EOMI. Anicteric sclerae. MMM  Resp:  Coarse breath sounds throughout. No accessory muscle use at rest  CV:  Regular  rhythm,  No edema  GI:  Soft, Non distended, Non tender.  +Bowel sounds  Neurologic:  Alert and oriented X 3, normal speech,   Psych:   Good insight. Not anxious nor agitated  Skin:  No rashes. No jaundice    Reviewed most current lab test results and cultures  YES  Reviewed most current radiology test results   YES  Review and summation of old records today    NO  Reviewed patient's current orders and MAR    YES  PMH/SH reviewed - no change compared to H&P  ________________________________________________________________________  Care Plan discussed with:    Comments   Patient x    Family  x    RN x    Care Manager     Consultant                        Multidiciplinary team rounds were held today with , nursing, pharmacist and clinical coordinator. Patient's plan of care was discussed; medications were reviewed and discharge planning was addressed.      ________________________________________________________________________  Total NON critical care TIME:  35   Minutes    Total CRITICAL CARE TIME Spent:   Minutes non procedure based      Comments   >50% of visit spent in counseling and coordination of care ________________________________________________________________________  Tawny Reddy MD     Procedures: see electronic medical records for all procedures/Xrays and details which were not copied into this note but were reviewed prior to creation of Plan. LABS:  I reviewed today's most current labs and imaging studies.   Pertinent labs include:  Recent Labs     11/30/19 0328 11/29/19  1243   WBC 19.7* 19.3*   HGB 11.7* 12.9   HCT 35.7* 38.9    275     Recent Labs     11/30/19 0328 11/29/19  1243   * 138   K 4.7 4.8    102   CO2 23 27   * 317*   BUN 48* 49*   CREA 1.50* 1.96*   CA 8.1* 9.0   ALB  --  3.3*   TBILI  --  0.6   SGOT  --  36   ALT  --  17       Signed: Tawny Reddy MD

## 2019-12-01 ENCOUNTER — APPOINTMENT (OUTPATIENT)
Dept: GENERAL RADIOLOGY | Age: 49
DRG: 871 | End: 2019-12-01
Attending: INTERNAL MEDICINE
Payer: COMMERCIAL

## 2019-12-01 LAB
ALBUMIN SERPL-MCNC: 2.4 G/DL (ref 3.5–5)
ALBUMIN/GLOB SERPL: 0.6 {RATIO} (ref 1.1–2.2)
ALP SERPL-CCNC: 104 U/L (ref 45–117)
ALT SERPL-CCNC: 27 U/L (ref 12–78)
ANION GAP SERPL CALC-SCNC: 10 MMOL/L (ref 5–15)
ARTERIAL PATENCY WRIST A: YES
AST SERPL-CCNC: 40 U/L (ref 15–37)
BACTERIA SPEC CULT: NORMAL
BASE DEFICIT BLD-SCNC: 3 MMOL/L
BASOPHILS # BLD: 0 K/UL (ref 0–0.1)
BASOPHILS NFR BLD: 0 % (ref 0–1)
BDY SITE: ABNORMAL
BILIRUB SERPL-MCNC: 0.3 MG/DL (ref 0.2–1)
BUN SERPL-MCNC: 35 MG/DL (ref 6–20)
BUN/CREAT SERPL: 30 (ref 12–20)
CALCIUM SERPL-MCNC: 8 MG/DL (ref 8.5–10.1)
CC UR VC: NORMAL
CHLORIDE SERPL-SCNC: 107 MMOL/L (ref 97–108)
CO2 SERPL-SCNC: 21 MMOL/L (ref 21–32)
CREAT SERPL-MCNC: 1.16 MG/DL (ref 0.7–1.3)
DIFFERENTIAL METHOD BLD: ABNORMAL
EOSINOPHIL # BLD: 0 K/UL (ref 0–0.4)
EOSINOPHIL NFR BLD: 0 % (ref 0–7)
ERYTHROCYTE [DISTWIDTH] IN BLOOD BY AUTOMATED COUNT: 12.9 % (ref 11.5–14.5)
GAS FLOW.O2 O2 DELIVERY SYS: ABNORMAL L/MIN
GAS FLOW.O2 SETTING OXYMISER: 5 L/M
GLOBULIN SER CALC-MCNC: 4.3 G/DL (ref 2–4)
GLUCOSE BLD STRIP.AUTO-MCNC: 108 MG/DL (ref 65–100)
GLUCOSE BLD STRIP.AUTO-MCNC: 135 MG/DL (ref 65–100)
GLUCOSE BLD STRIP.AUTO-MCNC: 161 MG/DL (ref 65–100)
GLUCOSE BLD STRIP.AUTO-MCNC: 204 MG/DL (ref 65–100)
GLUCOSE BLD STRIP.AUTO-MCNC: 229 MG/DL (ref 65–100)
GLUCOSE BLD STRIP.AUTO-MCNC: 263 MG/DL (ref 65–100)
GLUCOSE BLD STRIP.AUTO-MCNC: 277 MG/DL (ref 65–100)
GLUCOSE BLD STRIP.AUTO-MCNC: 278 MG/DL (ref 65–100)
GLUCOSE BLD STRIP.AUTO-MCNC: 284 MG/DL (ref 65–100)
GLUCOSE BLD STRIP.AUTO-MCNC: 359 MG/DL (ref 65–100)
GLUCOSE SERPL-MCNC: 185 MG/DL (ref 65–100)
HCO3 BLD-SCNC: 22.1 MMOL/L (ref 22–26)
HCT VFR BLD AUTO: 33.6 % (ref 36.6–50.3)
HGB BLD-MCNC: 11.2 G/DL (ref 12.1–17)
IMM GRANULOCYTES # BLD AUTO: 0 K/UL (ref 0–0.04)
IMM GRANULOCYTES NFR BLD AUTO: 0 % (ref 0–0.5)
LYMPHOCYTES # BLD: 0.4 K/UL (ref 0.8–3.5)
LYMPHOCYTES NFR BLD: 2 % (ref 12–49)
MAGNESIUM SERPL-MCNC: 2.5 MG/DL (ref 1.6–2.4)
MCH RBC QN AUTO: 31.5 PG (ref 26–34)
MCHC RBC AUTO-ENTMCNC: 33.3 G/DL (ref 30–36.5)
MCV RBC AUTO: 94.6 FL (ref 80–99)
MONOCYTES # BLD: 0.2 K/UL (ref 0–1)
MONOCYTES NFR BLD: 1 % (ref 5–13)
NEUTS SEG # BLD: 17 K/UL (ref 1.8–8)
NEUTS SEG NFR BLD: 97 % (ref 32–75)
NRBC # BLD: 0 K/UL (ref 0–0.01)
NRBC BLD-RTO: 0 PER 100 WBC
PCO2 BLD: 38.1 MMHG (ref 35–45)
PH BLD: 7.37 [PH] (ref 7.35–7.45)
PHOSPHATE SERPL-MCNC: 3.2 MG/DL (ref 2.6–4.7)
PLATELET # BLD AUTO: 274 K/UL (ref 150–400)
PMV BLD AUTO: 11.5 FL (ref 8.9–12.9)
PO2 BLD: 57 MMHG (ref 80–100)
POTASSIUM SERPL-SCNC: 4.5 MMOL/L (ref 3.5–5.1)
PROT SERPL-MCNC: 6.7 G/DL (ref 6.4–8.2)
RBC # BLD AUTO: 3.55 M/UL (ref 4.1–5.7)
RBC MORPH BLD: ABNORMAL
SAO2 % BLD: 89 % (ref 92–97)
SERVICE CMNT-IMP: ABNORMAL
SERVICE CMNT-IMP: NORMAL
SODIUM SERPL-SCNC: 138 MMOL/L (ref 136–145)
SPECIMEN TYPE: ABNORMAL
TOTAL RESP. RATE, ITRR: 28
WBC # BLD AUTO: 17.6 K/UL (ref 4.1–11.1)

## 2019-12-01 PROCEDURE — 80053 COMPREHEN METABOLIC PANEL: CPT

## 2019-12-01 PROCEDURE — 85025 COMPLETE CBC W/AUTO DIFF WBC: CPT

## 2019-12-01 PROCEDURE — 74011250637 HC RX REV CODE- 250/637: Performed by: INTERNAL MEDICINE

## 2019-12-01 PROCEDURE — 74011250636 HC RX REV CODE- 250/636: Performed by: INTERNAL MEDICINE

## 2019-12-01 PROCEDURE — 36600 WITHDRAWAL OF ARTERIAL BLOOD: CPT

## 2019-12-01 PROCEDURE — 82803 BLOOD GASES ANY COMBINATION: CPT

## 2019-12-01 PROCEDURE — 77010033678 HC OXYGEN DAILY

## 2019-12-01 PROCEDURE — 65660000000 HC RM CCU STEPDOWN

## 2019-12-01 PROCEDURE — 74011000250 HC RX REV CODE- 250: Performed by: INTERNAL MEDICINE

## 2019-12-01 PROCEDURE — 74011000258 HC RX REV CODE- 258: Performed by: INTERNAL MEDICINE

## 2019-12-01 PROCEDURE — 83735 ASSAY OF MAGNESIUM: CPT

## 2019-12-01 PROCEDURE — 94640 AIRWAY INHALATION TREATMENT: CPT

## 2019-12-01 PROCEDURE — 36415 COLL VENOUS BLD VENIPUNCTURE: CPT

## 2019-12-01 PROCEDURE — 71045 X-RAY EXAM CHEST 1 VIEW: CPT

## 2019-12-01 PROCEDURE — 74011636637 HC RX REV CODE- 636/637: Performed by: INTERNAL MEDICINE

## 2019-12-01 PROCEDURE — 84100 ASSAY OF PHOSPHORUS: CPT

## 2019-12-01 PROCEDURE — 94760 N-INVAS EAR/PLS OXIMETRY 1: CPT

## 2019-12-01 RX ORDER — FUROSEMIDE 10 MG/ML
40 INJECTION INTRAMUSCULAR; INTRAVENOUS ONCE
Status: COMPLETED | OUTPATIENT
Start: 2019-12-01 | End: 2019-12-01

## 2019-12-01 RX ORDER — SODIUM CHLORIDE 9 MG/ML
75 INJECTION, SOLUTION INTRAVENOUS CONTINUOUS
Status: DISCONTINUED | OUTPATIENT
Start: 2019-12-01 | End: 2019-12-02

## 2019-12-01 RX ADMIN — INSULIN LISPRO 2 UNITS: 100 INJECTION, SOLUTION INTRAVENOUS; SUBCUTANEOUS at 22:07

## 2019-12-01 RX ADMIN — Medication 10 ML: at 22:11

## 2019-12-01 RX ADMIN — INSULIN LISPRO 7 UNITS: 100 INJECTION, SOLUTION INTRAVENOUS; SUBCUTANEOUS at 18:41

## 2019-12-01 RX ADMIN — LEVALBUTEROL HYDROCHLORIDE 0.63 MG: 0.63 SOLUTION RESPIRATORY (INHALATION) at 19:48

## 2019-12-01 RX ADMIN — HUMAN INSULIN 20 UNITS: 100 INJECTION, SUSPENSION SUBCUTANEOUS at 22:07

## 2019-12-01 RX ADMIN — METHYLPREDNISOLONE SODIUM SUCCINATE 40 MG: 125 INJECTION, POWDER, FOR SOLUTION INTRAMUSCULAR; INTRAVENOUS at 10:34

## 2019-12-01 RX ADMIN — GUAIFENESIN 600 MG: 600 TABLET, EXTENDED RELEASE ORAL at 20:56

## 2019-12-01 RX ADMIN — METHYLPREDNISOLONE SODIUM SUCCINATE 40 MG: 125 INJECTION, POWDER, FOR SOLUTION INTRAMUSCULAR; INTRAVENOUS at 03:29

## 2019-12-01 RX ADMIN — METHYLPREDNISOLONE SODIUM SUCCINATE 40 MG: 125 INJECTION, POWDER, FOR SOLUTION INTRAMUSCULAR; INTRAVENOUS at 22:08

## 2019-12-01 RX ADMIN — FUROSEMIDE 40 MG: 10 INJECTION, SOLUTION INTRAMUSCULAR; INTRAVENOUS at 20:56

## 2019-12-01 RX ADMIN — AZITHROMYCIN MONOHYDRATE 500 MG: 500 INJECTION, POWDER, LYOPHILIZED, FOR SOLUTION INTRAVENOUS at 16:56

## 2019-12-01 RX ADMIN — INSULIN LISPRO 16 UNITS: 100 INJECTION, SOLUTION INTRAVENOUS; SUBCUTANEOUS at 09:18

## 2019-12-01 RX ADMIN — LEVALBUTEROL HYDROCHLORIDE 0.63 MG: 0.63 SOLUTION RESPIRATORY (INHALATION) at 08:19

## 2019-12-01 RX ADMIN — INSULIN LISPRO 3 UNITS: 100 INJECTION, SOLUTION INTRAVENOUS; SUBCUTANEOUS at 05:51

## 2019-12-01 RX ADMIN — METOPROLOL TARTRATE 12.5 MG: 25 TABLET ORAL at 09:20

## 2019-12-01 RX ADMIN — INSULIN LISPRO 10 UNITS: 100 INJECTION, SOLUTION INTRAVENOUS; SUBCUTANEOUS at 10:32

## 2019-12-01 RX ADMIN — METOPROLOL TARTRATE 12.5 MG: 25 TABLET ORAL at 18:43

## 2019-12-01 RX ADMIN — LEVALBUTEROL HYDROCHLORIDE 0.63 MG: 0.63 SOLUTION RESPIRATORY (INHALATION) at 02:54

## 2019-12-01 RX ADMIN — HUMAN INSULIN 20 UNITS: 100 INJECTION, SUSPENSION SUBCUTANEOUS at 15:55

## 2019-12-01 RX ADMIN — Medication 10 ML: at 03:30

## 2019-12-01 RX ADMIN — Medication 10 ML: at 15:58

## 2019-12-01 RX ADMIN — DULOXETINE 30 MG: 30 CAPSULE, DELAYED RELEASE ORAL at 18:44

## 2019-12-01 RX ADMIN — INSULIN LISPRO 16 UNITS: 100 INJECTION, SOLUTION INTRAVENOUS; SUBCUTANEOUS at 16:59

## 2019-12-01 RX ADMIN — CEFTRIAXONE SODIUM 2 G: 2 INJECTION, POWDER, FOR SOLUTION INTRAMUSCULAR; INTRAVENOUS at 15:59

## 2019-12-01 RX ADMIN — ASPIRIN 81 MG 81 MG: 81 TABLET ORAL at 09:16

## 2019-12-01 RX ADMIN — INSULIN GLARGINE 50 UNITS: 100 INJECTION, SOLUTION SUBCUTANEOUS at 09:17

## 2019-12-01 RX ADMIN — DULOXETINE 30 MG: 30 CAPSULE, DELAYED RELEASE ORAL at 09:17

## 2019-12-01 RX ADMIN — METHYLPREDNISOLONE SODIUM SUCCINATE 40 MG: 125 INJECTION, POWDER, FOR SOLUTION INTRAMUSCULAR; INTRAVENOUS at 15:56

## 2019-12-01 RX ADMIN — GUAIFENESIN 600 MG: 600 TABLET, EXTENDED RELEASE ORAL at 09:17

## 2019-12-01 RX ADMIN — INSULIN LISPRO 16 UNITS: 100 INJECTION, SOLUTION INTRAVENOUS; SUBCUTANEOUS at 12:47

## 2019-12-01 RX ADMIN — HEPARIN SODIUM 5000 UNITS: 5000 INJECTION INTRAVENOUS; SUBCUTANEOUS at 12:48

## 2019-12-01 RX ADMIN — INSULIN LISPRO 7 UNITS: 100 INJECTION, SOLUTION INTRAVENOUS; SUBCUTANEOUS at 14:29

## 2019-12-01 RX ADMIN — SODIUM CHLORIDE 75 ML/HR: 900 INJECTION, SOLUTION INTRAVENOUS at 12:50

## 2019-12-01 RX ADMIN — LEVALBUTEROL HYDROCHLORIDE 0.63 MG: 0.63 SOLUTION RESPIRATORY (INHALATION) at 14:57

## 2019-12-01 RX ADMIN — HEPARIN SODIUM 5000 UNITS: 5000 INJECTION INTRAVENOUS; SUBCUTANEOUS at 20:56

## 2019-12-01 RX ADMIN — HEPARIN SODIUM 5000 UNITS: 5000 INJECTION INTRAVENOUS; SUBCUTANEOUS at 03:27

## 2019-12-01 RX ADMIN — Medication 1 CAPSULE: at 09:19

## 2019-12-01 RX ADMIN — HUMAN INSULIN 20 UNITS: 100 INJECTION, SUSPENSION SUBCUTANEOUS at 10:33

## 2019-12-01 NOTE — ROUTINE PROCESS
* No surgery found * 
* No surgery found * Bedside and Verbal shift change report given to [place] RN(oncoming nurse) by Gianni Tan RN (offgoing nurse). Report included the following information SBAR, Kardex, MAR and Recent Results. Zone Phone:   [phone] Significant changes during shift:   
1) hypoglycemic episode, see note Patient Information Clinton Herrera 46 y.o. 
11/29/2019  1:32 PM by Eliana Weinstein MD. Clinton Herrera was admitted from home Problem List 
 
Patient Active Problem List  
 Diagnosis Date Noted  Bilateral pneumonia 11/29/2019  Severe sepsis ([address] 11/29/2019  GABY (acute kidney injury) ([address] 11/29/2019  Severe obesity ([address] 10/30/2018  Type 2 diabetes with nephropathy ([address] 03/30/2018  Vitamin D deficiency 02/27/2012  ED (erectile dysfunction) 01/09/2012  Type I diabetes mellitus, uncontrolled ([address] 09/08/2011  Essential hypertension, benign 09/08/2011  Hyperlipidemia LDL goal <70 09/08/2011  Unspecified cerebral artery occlusion with cerebral infarction 09/06/2011 Past Medical History:  
Diagnosis Date  Diabetes ([address]  Other and unspecified hyperlipidemia  Stroke ([address] TIA September 2011  TIA (transient ischemic attack) Sept 2011  Type I (juvenile type) diabetes mellitus without mention of complication, uncontrolled Age 8  Unspecified essential hypertension  Unspecified vitamin D deficiency 2/27/2012 Core Measures: CVA: No Not applicable CHF:No Not applicable PNA:Yes Not applicable Activity Status: OOB to Chair No 
Ambulated this shift Yes Bed Rest No 
 
Supplemental O2: (If Applicable) NC yes NRB No 
Venti-mask No 
On 4 Liters/min LINES AND DRAINS: 
 
PIV 
 
DVT prophylaxis: DVT prophylaxis Med- Yes DVT prophylaxis SCD or WALESKA- No  
 
Wounds: (If Applicable) Wounds- No 
 
Location none Patient Safety: 
 
Falls Score Total Score: 1 Safety Level_______ Bed Alarm On? No 
Sitter?  No 
 Plan for upcoming shift: abx, breathing treatments, steroids, Q4 hour BG checks Discharge Plan: No no note Active Consults: 
None

## 2019-12-01 NOTE — PROGRESS NOTES
HYPOGLYCEMIC EPISODE DOCUMENTATION    Patient with hypoglycemic episode(s) at 2035(time) on 11/30(date). BG value(s) pre-treatment 50    Was patient symptomatic? [] yes, [x] no  Patient was treated with the following rescue medications/treatments: [] D50                [] Glucose tablets                [] Glucagon                [x] 4oz juice                [] 6oz reg soda                [] 8oz low fat milk  BG value post-treatment: 75  2114Retreated with 4 ax jiuce   2147 recheck 126    Once BG treated and value greater than 80mg/dl, pt was provided with the following:  [x] snack  [] meal  Name of MD notified:Telehospitalist Dr Jorge Luis Dick  The following orders were received: Hold all NPH insulin for the night, recheck BG approx every 2 hours.   Repeated orders for clarification & entered

## 2019-12-01 NOTE — PROGRESS NOTES
Problem: Patient Education: Go to Patient Education Activity  Goal: Patient/Family Education  Outcome: Progressing Towards Goal     Problem: Pneumonia: Day 1  Goal: Off Pathway (Use only if patient is Off Pathway)  Outcome: Progressing Towards Goal     Problem: Falls - Risk of  Goal: *Absence of Falls  Description  Document [place] [facility] Fall Risk and appropriate interventions in the flowsheet.   Outcome: Progressing Towards Goal  Note: Fall Risk Interventions:            Medication Interventions: Evaluate medications/consider consulting pharmacy

## 2019-12-01 NOTE — ROUTINE PROCESS
Bedside and Verbal shift change report given to Jeana (oncoming nurse) by Viktor Nunes RN (offgoing nurse). Report included the following information SBAR, Kardex, Recent Results, Med Rec Status and Cardiac Rhythm SR. 
  
Zone Phone:   [phone] 
  
  
Significant changes during shift:  changes in insulin medications   
  
  
Patient Information 
  
Dominique Krisnha 46 y.o. 
11/29/2019  1:32 PM by Graeme Logan MD. Dominique Krishna was admitted from Home 
  
Problem List 
  
    
Patient Active Problem List  
  Diagnosis Date Noted  Bilateral pneumonia 11/29/2019  Severe sepsis ([address] 11/29/2019  GABY (acute kidney injury) ([address] 11/29/2019  Severe obesity ([address] 10/30/2018  Type 2 diabetes with nephropathy ([address] 03/30/2018  Vitamin D deficiency 02/27/2012  ED (erectile dysfunction) 01/09/2012  Type I diabetes mellitus, uncontrolled ([address] 09/08/2011  Essential hypertension, benign 09/08/2011  Hyperlipidemia LDL goal <70 09/08/2011  Unspecified cerebral artery occlusion with cerebral infarction 09/06/2011  
  
    
Past Medical History:  
Diagnosis Date  Diabetes ([address]    
 Other and unspecified hyperlipidemia    
 Stroke [facility])    
  TIA September 2011  TIA (transient ischemic attack) Sept 2011  Type I (juvenile type) diabetes mellitus without mention of complication, uncontrolled Age 8  Unspecified essential hypertension    
 Unspecified vitamin D deficiency 2/27/2012  
  
  
  
Core Measures: 
  
CVA: No No 
CHF:No No 
PNA:Yes Yes 
  
 
  
Activity Status: 
  
OOB to Chair Yes Ambulated this shift Yes Bed Rest No 
  
Supplemental O2: (If Applicable) 
  
NC Yes NRB No 
Venti-mask No 
On 4 Liters/min 
  
  
 
DVT prophylaxis: 
  
DVT prophylaxis Med- yes DVT prophylaxis SCD or WALESKA- No  
  
Wounds: (If Applicable) 
  
Wounds- No 
  
Location 0 
  
Patient Safety: 
  
Falls Score Safety Level_______ Bed Alarm On? No 
Sitter?  Not applicable 
  
 Plan for upcoming shift: IVF, IV antibitics, Q 4 blood sugar 
  
  
  
Discharge Plan: Yes TBD 
  
Active Consults: 
None

## 2019-12-01 NOTE — PROGRESS NOTES
Hospitalist Progress Note    NAME: Alea Castillo   :  1970   MRN:  [id number]       Assessment / Plan:  Pneumonia with Bronchitis and Bronchospasm causing Acute Respiratory Failure with Hypoxia (all POA)  Severe Sepsis from above  -admit CXR read as: \"Bronchitis with bilateral lower lobe pneumonia or alveolitis\"  -continue on Rocephin and Azithromycin  -await Cultures (BCx NG 2d)  -continue solumedrol  -scheduled Nebs  -wean O2 as tolerated    Diabetes, Type II: uncontrolled in patient with hyperglycemia: insulin regimen modified as follows today  Hypoglycemia on  because patient received mealtime humalog 16 units, then felt nauseous and went to sleep without eating  -Lantus 100 units daily (this is the hospital equivalent for patient's home [place])   -NPH 20 units q6h linked with solumedrol, may need to up-titrate pending trends  -Humalog 16 units TIDAC ordered; patient educated that if he is nauseous and maybe not going to eat that he should refuse this medicaiton  -Sliding scale (resistant coverage); q4h glucose checks ordered     GABY: resolved  -continue IVFs, rate decreased, would stop but will continue at decreased rate while patient has notable hyperglycemia    Obesity: BMI 36.73     Code Status: Full code  Surrogate Decision Maker: Wife Jean Pandey      DVT Prophylaxis: SQ heparin  GI Prophylaxis: not indicated     Baseline: Patient is ambulatory at baseline     Subjective:     Chief Complaint / Reason for Physician Visit: follow-up pneumonia  Patient seen for follow-up  Feeling better today    Review of Systems:  Symptom Y/N Comments  Symptom Y/N Comments   Fever/Chills    Chest Pain n    Poor Appetite    Edema n    Cough y   Abdominal Pain n    Sputum y   Joint Pain     SOB/PETERSEN y   Pruritis/Rash     Nausea/vomit n Nausea resolved  Tolerating PT/OT     Diarrhea    Tolerating Diet     Constipation    Other       Could NOT obtain due to:      Objective:     VITALS:   Last 24hrs VS reviewed since prior progress note. Most recent are:  Patient Vitals for the past 24 hrs:   Temp Pulse Resp BP SpO2   12/01/19 0819     92 %   12/01/19 0720 97.6 °F (36.4 °C) 88 20 162/69 93 %   12/01/19 0325 97.8 °F (36.6 °C) 88 20 134/64 91 %   12/01/19 0253     95 %   11/30/19 2350 97.9 °F (36.6 °C) 86 22 124/66 90 %   11/30/19 2118     91 %   11/30/19 1843 97.7 °F (36.5 °C) 93 20 118/60 90 %   11/30/19 1555 97.9 °F (36.6 °C) (!) 101 19 148/71 92 %   11/30/19 1430     97 %   11/30/19 1151 97.7 °F (36.5 °C) 95 20 157/77 97 %     No intake or output data in the 24 hours ending 12/01/19 1054     PHYSICAL EXAM:  General: WD, WN. Alert, cooperative, no acute distress    EENT:  EOMI. Anicteric sclerae. MMM  Resp:  Coarse breath sounds throughout without significant change in diffuse wheezing. No accessory muscle use at rest  CV:  Regular  rhythm,  No edema  GI:  Soft, Non distended, Non tender.  +Bowel sounds  Neurologic:  Alert and oriented X 3, normal speech,   Psych:   Good insight. Not anxious nor agitated  Skin:  No rashes. No jaundice    Reviewed most current lab test results and cultures  YES  Reviewed most current radiology test results   YES  Review and summation of old records today    NO  Reviewed patient's current orders and MAR    YES  PMH/SH reviewed - no change compared to H&P  ________________________________________________________________________  Care Plan discussed with:    Comments   Patient x    Family  x    RN x    Care Manager     Consultant                        Multidiciplinary team rounds were held today with , nursing, pharmacist and clinical coordinator. Patient's plan of care was discussed; medications were reviewed and discharge planning was addressed.      ________________________________________________________________________  Total NON critical care TIME:  25   Minutes    Total CRITICAL CARE TIME Spent:   Minutes non procedure based      Comments   >50% of visit spent in counseling and coordination of care     ________________________________________________________________________  Anjum Rodríguez MD     Procedures: see electronic medical records for all procedures/Xrays and details which were not copied into this note but were reviewed prior to creation of Plan. LABS:  I reviewed today's most current labs and imaging studies.   Pertinent labs include:  Recent Labs     12/01/19  0541 11/30/19 0328 11/29/19  1243   WBC 17.6* 19.7* 19.3*   HGB 11.2* 11.7* 12.9   HCT 33.6* 35.7* 38.9    270 275     Recent Labs     12/01/19  0541 11/30/19 0328 11/29/19  1243    135* 138   K 4.5 4.7 4.8    105 102   CO2 21 23 27   * 382* 317*   BUN 35* 48* 49*   CREA 1.16 1.50* 1.96*   CA 8.0* 8.1* 9.0   MG 2.5*  --   --    PHOS 3.2  --   --    ALB 2.4*  --  3.3*   TBILI 0.3  --  0.6   SGOT 40*  --  36   ALT 27  --  17       Signed: Anjum Rodríguez MD

## 2019-12-01 NOTE — PROGRESS NOTES
Reason for Admission: Bilateral PNA                     RRAT Score: 15                 Do you (patient/family) have any concerns for transition/discharge? The patient resides in a 1 level home with 5 steps to enter with his wife and 39 y/o stepdaughter. The patient has 1 son and 1 daughter that live locally that are supportive. The patient has 2 grandchildren. He reports his family is extremely supportive. Plan for utilizing home health: Unknown at this time. Currently, there are no PT/OT consults. The patient likely will have no HH needs as he is completely independent in his ADLs and IADLs     Current Advanced Directive/Advance Care Plan:  The patient is a full code and he does not have a MPOA/AD             Transition of Care Plan:    CM met with the patient at bedside to discuss dispo. The patient resides in a 1 level home with 5 steps to enter with his wife and 39 y/o stepdaughter. He has 1 son and 1 daughter that live locally. His family is extremely supportive. The patient is completely independent in his ADLs and IADLs. He uses no assistive device when ambulating. He is able to drive and his wife will assist with d/c transportation. The patient is employed full-time. He uses the [facility] on [address] [place] [address] for his medications. The patient is currently on O2 and he has never had O2 at home. CM will continue to follow the patient for dispo needs. Care Management Interventions  PCP Verified by CM: Yes(The patient last saw his PCP about 1 year ago )  Mode of Transport at Discharge:  Other (see comment)(The patient's wife will assist with d/c transportation )  Transition of Care Consult (CM Consult): Discharge Planning  MyChart Signup: No  Discharge Durable Medical Equipment: No  Physical Therapy Consult: No  Occupational Therapy Consult: No  Speech Therapy Consult: No  Current Support Network: Lives with Spouse(The patient's 39 y/o stepdaughter lives with the patient and his wife )  Confirm Follow Up Transport: Self  Plan discussed with Pt/Family/Caregiver: Yes  Freedom of Choice Offered: Yes   Resource Information Provided?: No  Discharge Location  Discharge Placement: Home    Holli anderson LCSW

## 2019-12-02 LAB
ANION GAP SERPL CALC-SCNC: 8 MMOL/L (ref 5–15)
BNP SERPL-MCNC: 2063 PG/ML
BUN SERPL-MCNC: 38 MG/DL (ref 6–20)
BUN/CREAT SERPL: 31 (ref 12–20)
CALCIUM SERPL-MCNC: 7.8 MG/DL (ref 8.5–10.1)
CHLORIDE SERPL-SCNC: 106 MMOL/L (ref 97–108)
CO2 SERPL-SCNC: 19 MMOL/L (ref 21–32)
CREAT SERPL-MCNC: 1.23 MG/DL (ref 0.7–1.3)
ERYTHROCYTE [DISTWIDTH] IN BLOOD BY AUTOMATED COUNT: 12.7 % (ref 11.5–14.5)
GLUCOSE BLD STRIP.AUTO-MCNC: 123 MG/DL (ref 65–100)
GLUCOSE BLD STRIP.AUTO-MCNC: 123 MG/DL (ref 65–100)
GLUCOSE BLD STRIP.AUTO-MCNC: 167 MG/DL (ref 65–100)
GLUCOSE BLD STRIP.AUTO-MCNC: 195 MG/DL (ref 65–100)
GLUCOSE BLD STRIP.AUTO-MCNC: 227 MG/DL (ref 65–100)
GLUCOSE BLD STRIP.AUTO-MCNC: 96 MG/DL (ref 65–100)
GLUCOSE SERPL-MCNC: 178 MG/DL (ref 65–100)
HCT VFR BLD AUTO: 35.9 % (ref 36.6–50.3)
HGB BLD-MCNC: 12.5 G/DL (ref 12.1–17)
MCH RBC QN AUTO: 32.1 PG (ref 26–34)
MCHC RBC AUTO-ENTMCNC: 34.8 G/DL (ref 30–36.5)
MCV RBC AUTO: 92.3 FL (ref 80–99)
NRBC # BLD: 0 K/UL (ref 0–0.01)
NRBC BLD-RTO: 0 PER 100 WBC
PLATELET # BLD AUTO: 376 K/UL (ref 150–400)
PMV BLD AUTO: 11.5 FL (ref 8.9–12.9)
POTASSIUM SERPL-SCNC: 4.4 MMOL/L (ref 3.5–5.1)
RBC # BLD AUTO: 3.89 M/UL (ref 4.1–5.7)
SERVICE CMNT-IMP: ABNORMAL
SERVICE CMNT-IMP: NORMAL
SODIUM SERPL-SCNC: 133 MMOL/L (ref 136–145)
WBC # BLD AUTO: 27 K/UL (ref 4.1–11.1)

## 2019-12-02 PROCEDURE — 82962 GLUCOSE BLOOD TEST: CPT

## 2019-12-02 PROCEDURE — 74011250637 HC RX REV CODE- 250/637: Performed by: INTERNAL MEDICINE

## 2019-12-02 PROCEDURE — 77010033678 HC OXYGEN DAILY

## 2019-12-02 PROCEDURE — 94760 N-INVAS EAR/PLS OXIMETRY 1: CPT

## 2019-12-02 PROCEDURE — 36415 COLL VENOUS BLD VENIPUNCTURE: CPT

## 2019-12-02 PROCEDURE — 65660000000 HC RM CCU STEPDOWN

## 2019-12-02 PROCEDURE — 93005 ELECTROCARDIOGRAM TRACING: CPT

## 2019-12-02 PROCEDURE — 74011000258 HC RX REV CODE- 258: Performed by: INTERNAL MEDICINE

## 2019-12-02 PROCEDURE — 74011250636 HC RX REV CODE- 250/636: Performed by: INTERNAL MEDICINE

## 2019-12-02 PROCEDURE — 74011636637 HC RX REV CODE- 636/637: Performed by: INTERNAL MEDICINE

## 2019-12-02 PROCEDURE — 83880 ASSAY OF NATRIURETIC PEPTIDE: CPT

## 2019-12-02 PROCEDURE — 80048 BASIC METABOLIC PNL TOTAL CA: CPT

## 2019-12-02 PROCEDURE — 94640 AIRWAY INHALATION TREATMENT: CPT

## 2019-12-02 PROCEDURE — 74011000250 HC RX REV CODE- 250: Performed by: INTERNAL MEDICINE

## 2019-12-02 PROCEDURE — 85027 COMPLETE CBC AUTOMATED: CPT

## 2019-12-02 PROCEDURE — 36600 WITHDRAWAL OF ARTERIAL BLOOD: CPT

## 2019-12-02 RX ORDER — INSULIN LISPRO 100 [IU]/ML
12 INJECTION, SOLUTION INTRAVENOUS; SUBCUTANEOUS
Status: DISCONTINUED | OUTPATIENT
Start: 2019-12-02 | End: 2019-12-08 | Stop reason: HOSPADM

## 2019-12-02 RX ORDER — INSULIN GLARGINE 100 [IU]/ML
100 INJECTION, SOLUTION SUBCUTANEOUS DAILY
Status: DISCONTINUED | OUTPATIENT
Start: 2019-12-03 | End: 2019-12-03

## 2019-12-02 RX ORDER — FAMOTIDINE 20 MG/1
10 TABLET, FILM COATED ORAL 2 TIMES DAILY
Status: DISCONTINUED | OUTPATIENT
Start: 2019-12-02 | End: 2019-12-08 | Stop reason: HOSPADM

## 2019-12-02 RX ORDER — AMLODIPINE BESYLATE 2.5 MG/1
2.5 TABLET ORAL DAILY
Status: DISCONTINUED | OUTPATIENT
Start: 2019-12-03 | End: 2019-12-04

## 2019-12-02 RX ORDER — FUROSEMIDE 10 MG/ML
40 INJECTION INTRAMUSCULAR; INTRAVENOUS 2 TIMES DAILY
Status: DISCONTINUED | OUTPATIENT
Start: 2019-12-02 | End: 2019-12-04

## 2019-12-02 RX ORDER — POTASSIUM CHLORIDE 750 MG/1
10 TABLET, FILM COATED, EXTENDED RELEASE ORAL 2 TIMES DAILY
Status: DISCONTINUED | OUTPATIENT
Start: 2019-12-02 | End: 2019-12-07

## 2019-12-02 RX ADMIN — FUROSEMIDE 40 MG: 10 INJECTION, SOLUTION INTRAMUSCULAR; INTRAVENOUS at 12:09

## 2019-12-02 RX ADMIN — LEVALBUTEROL HYDROCHLORIDE 0.63 MG: 0.63 SOLUTION RESPIRATORY (INHALATION) at 13:53

## 2019-12-02 RX ADMIN — FUROSEMIDE 40 MG: 10 INJECTION, SOLUTION INTRAMUSCULAR; INTRAVENOUS at 17:50

## 2019-12-02 RX ADMIN — GUAIFENESIN 600 MG: 600 TABLET, EXTENDED RELEASE ORAL at 08:28

## 2019-12-02 RX ADMIN — METHYLPREDNISOLONE SODIUM SUCCINATE 40 MG: 125 INJECTION, POWDER, FOR SOLUTION INTRAMUSCULAR; INTRAVENOUS at 16:54

## 2019-12-02 RX ADMIN — POTASSIUM CHLORIDE 10 MEQ: 750 TABLET, FILM COATED, EXTENDED RELEASE ORAL at 17:44

## 2019-12-02 RX ADMIN — Medication 1 CAPSULE: at 08:28

## 2019-12-02 RX ADMIN — Medication 10 ML: at 04:15

## 2019-12-02 RX ADMIN — FAMOTIDINE 10 MG: 20 TABLET ORAL at 17:44

## 2019-12-02 RX ADMIN — HUMAN INSULIN 20 UNITS: 100 INJECTION, SUSPENSION SUBCUTANEOUS at 16:53

## 2019-12-02 RX ADMIN — HUMAN INSULIN 20 UNITS: 100 INJECTION, SUSPENSION SUBCUTANEOUS at 04:15

## 2019-12-02 RX ADMIN — METHYLPREDNISOLONE SODIUM SUCCINATE 40 MG: 125 INJECTION, POWDER, FOR SOLUTION INTRAMUSCULAR; INTRAVENOUS at 04:15

## 2019-12-02 RX ADMIN — FAMOTIDINE 10 MG: 20 TABLET ORAL at 12:09

## 2019-12-02 RX ADMIN — METOPROLOL TARTRATE 12.5 MG: 25 TABLET ORAL at 17:45

## 2019-12-02 RX ADMIN — INSULIN GLARGINE 50 UNITS: 100 INJECTION, SOLUTION SUBCUTANEOUS at 08:29

## 2019-12-02 RX ADMIN — HUMAN INSULIN 20 UNITS: 100 INJECTION, SUSPENSION SUBCUTANEOUS at 09:05

## 2019-12-02 RX ADMIN — METOPROLOL TARTRATE 12.5 MG: 25 TABLET ORAL at 08:28

## 2019-12-02 RX ADMIN — LEVALBUTEROL HYDROCHLORIDE 0.63 MG: 0.63 SOLUTION RESPIRATORY (INHALATION) at 21:17

## 2019-12-02 RX ADMIN — INSULIN LISPRO 4 UNITS: 100 INJECTION, SOLUTION INTRAVENOUS; SUBCUTANEOUS at 13:00

## 2019-12-02 RX ADMIN — Medication 10 ML: at 21:09

## 2019-12-02 RX ADMIN — DULOXETINE 30 MG: 30 CAPSULE, DELAYED RELEASE ORAL at 08:29

## 2019-12-02 RX ADMIN — ASPIRIN 81 MG 81 MG: 81 TABLET ORAL at 08:29

## 2019-12-02 RX ADMIN — HEPARIN SODIUM 5000 UNITS: 5000 INJECTION INTRAVENOUS; SUBCUTANEOUS at 04:14

## 2019-12-02 RX ADMIN — LEVALBUTEROL HYDROCHLORIDE 0.63 MG: 0.63 SOLUTION RESPIRATORY (INHALATION) at 01:56

## 2019-12-02 RX ADMIN — INSULIN LISPRO 12 UNITS: 100 INJECTION, SOLUTION INTRAVENOUS; SUBCUTANEOUS at 17:44

## 2019-12-02 RX ADMIN — CEFTRIAXONE SODIUM 2 G: 2 INJECTION, POWDER, FOR SOLUTION INTRAMUSCULAR; INTRAVENOUS at 15:57

## 2019-12-02 RX ADMIN — INSULIN LISPRO 16 UNITS: 100 INJECTION, SOLUTION INTRAVENOUS; SUBCUTANEOUS at 11:30

## 2019-12-02 RX ADMIN — LEVALBUTEROL HYDROCHLORIDE 0.63 MG: 0.63 SOLUTION RESPIRATORY (INHALATION) at 07:35

## 2019-12-02 RX ADMIN — INSULIN LISPRO 16 UNITS: 100 INJECTION, SOLUTION INTRAVENOUS; SUBCUTANEOUS at 07:30

## 2019-12-02 RX ADMIN — DULOXETINE 30 MG: 30 CAPSULE, DELAYED RELEASE ORAL at 17:44

## 2019-12-02 RX ADMIN — METHYLPREDNISOLONE SODIUM SUCCINATE 40 MG: 125 INJECTION, POWDER, FOR SOLUTION INTRAMUSCULAR; INTRAVENOUS at 09:05

## 2019-12-02 RX ADMIN — Medication 10 ML: at 14:00

## 2019-12-02 RX ADMIN — HEPARIN SODIUM 5000 UNITS: 5000 INJECTION INTRAVENOUS; SUBCUTANEOUS at 12:09

## 2019-12-02 RX ADMIN — AZITHROMYCIN MONOHYDRATE 500 MG: 500 INJECTION, POWDER, LYOPHILIZED, FOR SOLUTION INTRAVENOUS at 14:05

## 2019-12-02 RX ADMIN — GUAIFENESIN 600 MG: 600 TABLET, EXTENDED RELEASE ORAL at 21:09

## 2019-12-02 RX ADMIN — HEPARIN SODIUM 5000 UNITS: 5000 INJECTION INTRAVENOUS; SUBCUTANEOUS at 21:08

## 2019-12-02 RX ADMIN — INSULIN LISPRO 3 UNITS: 100 INJECTION, SOLUTION INTRAVENOUS; SUBCUTANEOUS at 09:00

## 2019-12-02 NOTE — PROGRESS NOTES
Bedside and Verbal shift change report given to [facility] RN(oncoming nurse) by Meghan Schwartz RN (offgoing nurse). Report included the following information SBAR, Kardex, MAR and Recent Results.     Zone Phone:   [phone]        Significant changes during shift:    Please see notes.            Patient Information     Lindsay Delaney  46 y.o.  11/29/2019  1:32 PM by Jony Mejia MD. Lindsay Delaney was admitted from home  Problem List          Patient Active Problem List     Diagnosis Date Noted    Bilateral pneumonia 11/29/2019    Severe sepsis ([address] 11/29/2019    GABY (acute kidney injury) ([address] 11/29/2019    Severe obesity ([address] 10/30/2018    Type 2 diabetes with nephropathy ([address] 03/30/2018    Vitamin D deficiency 02/27/2012    ED (erectile dysfunction) 01/09/2012    Type I diabetes mellitus, uncontrolled ([address] 09/08/2011    Essential hypertension, benign 09/08/2011    Hyperlipidemia LDL goal <70 09/08/2011    Unspecified cerebral artery occlusion with cerebral infarction 09/06/2011           Past Medical History:   Diagnosis Date    Diabetes ([address]      Other and unspecified hyperlipidemia      Stroke [facility])       TIA September 2011    TIA (transient ischemic attack) Sept 2011    Type I (juvenile type) diabetes mellitus without mention of complication, uncontrolled Age 8    Unspecified essential hypertension      Unspecified vitamin D deficiency 2/27/2012            Core Measures:     CVA: No Not applicable  CHF:No Not applicable  PNA:Yes Not applicable        Activity Status:     OOB to Chair No  Ambulated this shift Yes   Bed Rest No     Supplemental O2: (If Applicable)     NC yes  NRB No  Venti-mask No  On 4 Liters/min        LINES AND DRAINS:     PIV     DVT prophylaxis:     DVT prophylaxis Med- Yes  DVT prophylaxis SCD or WALESKA- No      Wounds: (If Applicable)     Wounds- No     Location none     Patient Safety:     Falls Score Total Score: 1  Safety Level_______  Bed Alarm On? No  Sitter? No     Plan for upcoming shift: abx, breathing treatments, steroids, Q4 hour BG checks           Discharge Plan: No no note     Active Consults:  None

## 2019-12-02 NOTE — CONSULTS
Consult    NAME: Caridad Ramsey   :  1970   MRN:  [id number]     Date/Time:  2019 1:06 PM    Patient PCP: Svetlana Valdivia MD  ________________________________________________________________________     Assessment:     Cough, chills, pneumonia, leukocytosis, GABY  Acute diastolic chf    - No hx of CAD  - Echo  EF 60%  - sinus  - Hypertensive heart disease with CKD  - Insulin DM Dr. Leydi Saavedra  - Dyslipidemia  - CVA in   - Obese/Snores, recommended sleep study  , 2 kids, works for [employer] [place] and as an , physical job        Plan:     Initial presentation consistent with pneumonia with fever, chills, leukocytosis. GABY, HCTZ held and given diuretic, hydration, but then likely acute diastolic chf. Check ECG  Check Echo  Outpt sleep study    1. Cont ASA  2. Cont added metoprolol 12.5mg bid  3. Resume amlodipine 2.5mg daily, was on 10mg as outpt  4. Holding lisinopril, resume prior to discharge  5. Stop fluids  6. Stop HCTZ, Agree with lasix 40mg iv bid, follow bmp, discharge on lower dose depending on EF  7. Cont crestor    Discussed with patient and wife. [x]        High complexity decision making was performed        Subjective:   CHIEF COMPLAINT: Dyspnea    HISTORY OF PRESENT ILLNESS:       Eneida Bennett is a 46 y.o.  male who presents with above complaint from home with wife ambulatory. Patient presents with chief complaint of progressive worsening of cough with sputum for the past 2 weeks  History of associated low-grade fever, malaise. History of diabetes managed by endocrinology Dr.Douglas Leydi Saavedra  History of wife been sick 2 weeks ago-recovering from respiratory illness now     Patient was found to have severe sepsis with mild GABY with chest x-ray showing bilateral pneumonia in the ER. Initially felt better with abx and hydration, last night acute dyspnea, orthopnea, better with diuresis this am, still wheezing.       We were asked to consult for work up and evaluation of the above problems. Past Medical History:   Diagnosis Date    Diabetes ([address]     Other and unspecified hyperlipidemia     Stroke [facility])     TIA September 2011    TIA (transient ischemic attack) Sept 2011    Type I (juvenile type) diabetes mellitus without mention of complication, uncontrolled Age 8    Unspecified essential hypertension     Unspecified vitamin D deficiency 2/27/2012      Past Surgical History:   Procedure Laterality Date    HX CATARACT REMOVAL Left 03/2019     No Known Allergies   Meds:  See below  Social History     Tobacco Use    Smoking status: Never Smoker    Smokeless tobacco: Never Used   Substance Use Topics    Alcohol use: Yes     Alcohol/week: 1.0 standard drinks     Types: 1 Cans of beer per week     Comment: Rare beer      Family History   Problem Relation Age of Onset    Cancer Father         Bone cancer    Heart Attack Maternal Grandmother     Heart Attack Maternal Grandfather     Stroke Maternal Grandfather     Heart Attack Paternal Grandmother     Heart Attack Paternal Grandfather     Diabetes Other         2 nieces with Type 1 diabetes    Heart Disease Neg Hx        REVIEW OF SYSTEMS:     []         Unable to obtain  ROS due to ---   [x]         Total of 12 systems reviewed as follows:     Total of 12 systems reviewed as follows:       POSITIVE= Bold text  Negative = normal text  General:  fever, chills, sweats, generalized weakness, weight loss/gain,      loss of appetite   Eyes:    blurred vision, eye pain, loss of vision, double vision  ENT:    rhinorrhea, pharyngitis   Respiratory:   cough, sputum production, SOB, PETERSEN, wheezing, pleuritic pain   Cardiology:   chest pain, palpitations, orthopnea, PND, edema, syncope   Gastrointestinal:  abdominal pain , N/V, diarrhea, dysphagia, constipation, bleeding   Genitourinary:  frequency, urgency, dysuria, hematuria, incontinence   Muskuloskeletal :  arthralgia, myalgia, back pain  Hematology:  easy bruising, nose or gum bleeding, lymphadenopathy   Dermatological: rash, ulceration, pruritis, color change / jaundice  Endocrine:   hot flashes or polydipsia   Neurological:  headache, dizziness, confusion, focal weakness, paresthesia,     Speech difficulties, memory loss, gait difficulty  Psychological: Feelings of anxiety, depression, agitation    Objective:      Physical Exam:    Last 24hrs VS reviewed since prior progress note. Most recent are:    Visit Vitals  /82 (BP 1 Location: Left arm, BP Patient Position: At rest)   Pulse 89   Temp 98 °F (36.7 °C)   Resp 18   Wt 97.1 kg (214 lb)   SpO2 94%   BMI 36.73 kg/m²       Intake/Output Summary (Last 24 hours) at 12/2/2019 1306  Last data filed at 12/2/2019 1249  Gross per 24 hour   Intake 200 ml   Output 2345 ml   Net -2145 ml        General Appearance: Well developed, well nourished, alert & oriented x 3,    no acute distress. Ears/Nose/Mouth/Throat: Pupils equal and round, Hearing grossly normal.  Neck: Supple. JVP within normal limits. Carotids good upstrokes, with no bruit. Chest: Lungs diffuse wheeze to auscultation bilaterally. Cardiovascular: Regular rate and rhythm, S1S2 normal, no murmur, rubs, gallops. Abdomen: Soft, non-tender, bowel sounds are active. No organomegaly. Extremities: No edema bilaterally. Femoral pulses +2, Distal Pulses +1. Skin: Warm and dry. Neuro: CN II-XII grossly intact, Strength and sensation grossly intact. Data:      Prior to Admission medications    Medication Sig Start Date End Date Taking? Authorizing Provider   DULoxetine (CYMBALTA) 30 mg capsule Take 1 Cap by mouth two (2) times a day.  11/22/19  Yes Prerna Hogan MD   hydroCHLOROthiazide (HYDRODIURIL) 25 mg tablet TAKE 1 TABLET BY MOUTH EVERY DAY 11/7/19  Yes Prerna Hogan MD   lisinopril (PRINIVIL, ZESTRIL) 40 mg tablet TAKE 1 TABLET DAILY 11/2/19  Yes Prerna Hogan MD   insulin aspart U-100 (NOVOLOG FLEXPEN U-100 INSULIN) 100 unit/mL (3 mL) inpn FOR DIRECTIONS ON HOW TO   TAKE THIS MEDICINE, READ   THE ENCLOSED MEDICATION    INFORMATION FORM 11/2/19  Yes Kristina Mathew MD   amLODIPine (NORVASC) 10 mg tablet TAKE 1 TABLET DAILY 11/2/19  Yes Kristina Mathew MD   naproxen (NAPROSYN) 500 mg tablet Take 500 mg by mouth as needed. 10/30/19  Yes Provider, Historical   b complex vitamins (B COMPLEX 1) tablet Take 1 Tab by mouth daily. Yes Provider, Historical   TRESIBA FLEXTOUCH U-200 200 unit/mL (3 mL) inpn Inject 100 units in the morning--dispense 18 pens for 90 day supply--Dose change 11/1/19--updated med list--did not send prescription to the pharmacy 11/1/19  Yes Kristina Mathew MD   rosuvastatin (CRESTOR) 20 mg tablet Take 1/2 tab daily--Dose change 1/17/19--updated med list--did not send prescription to the pharmacy 1/17/19  Yes Kristina Mathew MD   Insulin Needles, Disposable, (BD ULTRA-FINE MINI PEN NEEDLE) 31 gauge x 3/16\" ndle Use as directed 3 times daily 10/30/18  Yes Kristina Mathew MD   insulin syringe-needle U-100 1 mL 31 gauge x 15/64\" syrg 1 Syringe by SubCUTAneous route five (5) times daily. 7/2/18  Yes Kristina Mathew MD   aspirin 81 mg chewable tablet Take 81 mg by mouth daily. Yes Provider, Historical   ONETOUCH ULTRA TEST strip Test up to 6 times daily. Dx: 250.03 4/2/15  Yes Kristina Mathew MD   [facility]. 33 gauge misc Use as directed up to 6 times daily. Dx: 250.03 4/2/15  Yes Kristina Mathew MD   cholecalciferol, vitamin D3, (VITAMIN D3) 2,000 unit Tab Take  by mouth daily. Yes Provider, Historical   MV-MN/FA/D3/LYCOPENE/LUT/COQ10 (DAILY MULTIVITAMIN PO) Take  by mouth.    Yes Provider, Historical   TRESIBA FLEXTOUCH U-200 200 unit/mL (3 mL) inpn INJECT 110  UNITS IN THE MORNING 11/2/19   Kristina Mathew MD       Recent Results (from the past 24 hour(s))   GLUCOSE, POC    Collection Time: 12/01/19  2:20 PM   Result Value Ref Range    Glucose (POC) 263 (H) 65 - 100 mg/dL Performed by Helaine Rubinstein, POC    Collection Time: 12/01/19  4:36 PM   Result Value Ref Range    Glucose (POC) 229 (H) 65 - 100 mg/dL    Performed by Papito He (PCT)    GLUCOSE, POC    Collection Time: 12/01/19  6:21 PM   Result Value Ref Range    Glucose (POC) 278 (H) 65 - 100 mg/dL    Performed by Papito He (PCT)    POC G3 - PUL    Collection Time: 12/01/19  8:52 PM   Result Value Ref Range    pH (POC) 7.373 7.35 - 7.45      pCO2 (POC) 38.1 35.0 - 45.0 MMHG    pO2 (POC) 57 (L) 80 - 100 MMHG    HCO3 (POC) 22.1 22 - 26 MMOL/L    sO2 (POC) 89 (L) 92 - 97 %    Base deficit (POC) 3 mmol/L    Site RIGHT RADIAL      Device: NASAL CANNULA      Flow rate (POC) 5 L/M    Allens test (POC) YES      Specimen type (POC) ARTERIAL      Total resp.  rate 28     GLUCOSE, POC    Collection Time: 12/01/19  9:37 PM   Result Value Ref Range    Glucose (POC) 204 (H) 65 - 100 mg/dL    Performed by [address], POC    Collection Time: 12/02/19  1:55 AM   Result Value Ref Range    Glucose (POC) 123 (H) 65 - 100 mg/dL    Performed by Arleene Kussmaul (PCT)    NT-PRO BNP    Collection Time: 12/02/19  4:07 AM   Result Value Ref Range    NT pro-BNP 2,063 (H) <125 PG/ML   GLUCOSE, POC    Collection Time: 12/02/19  4:13 AM   Result Value Ref Range    Glucose (POC) 195 (H) 65 - 100 mg/dL    Performed by Javier Sanabria, POC    Collection Time: 12/02/19  8:18 AM   Result Value Ref Range    Glucose (POC) 167 (H) 65 - 100 mg/dL    Performed by Nasrin Colby (PCT)    GLUCOSE, POC    Collection Time: 12/02/19 11:38 AM   Result Value Ref Range    Glucose (POC) 227 (H) 65 - 100 mg/dL    Performed by Nasrin Colby (PCT)

## 2019-12-02 NOTE — PROGRESS NOTES
Blood sugars are still high but better. He is on complicated regimen of insulin. Voiding well. No complaints. Tolerating diet.

## 2019-12-02 NOTE — PROGRESS NOTES
ADULT PROTOCOL: JET AEROSOL  REASSESSMENT    Patient  Marla Goodman     46 y.o.   male     12/1/2019  8:00 PM    Breath Sounds Pre Procedure: Right Breath Sounds: Coarse, Crackles                               Left Breath Sounds: Coarse, Crackles    Breath Sounds Post Procedure: Right Breath Sounds: Coarse, Crackles                                 Left Breath Sounds: Coarse, Crackles    Breathing pattern: Pre procedure Breathing Pattern: Regular          Post procedure Breathing Pattern: Regular    Heart Rate: Pre procedure Pulse: 88           Post procedure Pulse: 88    Resp Rate: Pre procedure Respirations: 28           Post procedure Respirations: 30    Oxygen: O2 Device: Nasal cannula   Flow rate (L/min) 4     Changed: YES   6LPM    SpO2: Pre procedure SpO2: (!) 89 %(Called for nurse to asses patient.  Gave breathing treatment)   with oxygen              Post procedure SpO2: 91 %(increased to 5LPM)  with oxygen    Nebulizer Therapy: Current medications Aerosolized Medications: DuoNeb      Changed: NO    Smoking History: Never Smoked    Problem List:   Patient Active Problem List   Diagnosis Code    Unspecified cerebral artery occlusion with cerebral infarction I63.50    Type I diabetes mellitus, uncontrolled ([address] E10.65    Essential hypertension, benign I10    Hyperlipidemia LDL goal <70 E78.5    ED (erectile dysfunction) N52.9    Vitamin D deficiency E55.9    Type 2 diabetes with nephropathy ([address] E11.21    Severe obesity ([address] E66.01    Bilateral pneumonia J18.9    Severe sepsis (HCC) A41.9, R65.20    GABY (acute kidney injury) ([address] N17.9       Respiratory Therapist: Alverto Miradna RT

## 2019-12-02 NOTE — PROGRESS NOTES
Hospitalist Progress Note    NAME: Fabiola Burden   :  1970   MRN:  [id number]       Assessment / Plan:  Acute Pulmonary Edema: occurred overnight   -IV lasix BID ordered  -TTE  -Cardiology consulted, discussed briefly with Dr. Justin Diaz    Pneumonia with Bronchitis and Bronchospasm causing Acute Respiratory Failure with Hypoxia (all POA)  Severe Sepsis from above  -admit CXR read as: \"Bronchitis with bilateral lower lobe pneumonia or alveolitis\"  -continue on Rocephin and Azithromycin  -await Cultures (BCx NG 3d)  -discontinue solumedrol, suspect current wheezing more from edema  -continue scheduled Nebs  -wean O2 as tolerated    Diabetes, Type II: uncontrolled in patient with hyperglycemia: insulin regimen modified as follows today  Hypoglycemia on  because patient received mealtime humalog 16 units, then felt nauseous and went to sleep without eating  -Lantus 100 units daily (this is the hospital equivalent for patient's home Tresiba) reordered  -NPH discontinued along with solumedrol  -Humalog decreased to 12 units [facility]; patient educated that if he is nauseous and maybe not going to eat that he should refuse this medicaiton  -Sliding scale (resistant coverage); q4h glucose checks ordered; nursing misc order placed to transition to ACHS glucose checks and SSI tomorrow (12/3)     GABY: resolved  -IVF's no discontinued, monitor renal function closely while diuresing    Obesity: BMI 36.73     Code Status: Full code  Surrogate Decision Maker: Wife Kim Gaitan      DVT Prophylaxis: SQ heparin  GI Prophylaxis: not indicated     Baseline: Patient is ambulatory at baseline     Subjective:     Chief Complaint / Reason for Physician Visit: follow-up pneumonia  Patient seen for follow-up  SOB worse overnight  CXR with pulm edema  Feels better after diuresis    Review of Systems:  Symptom Y/N Comments  Symptom Y/N Comments   Fever/Chills    Chest Pain n    Poor Appetite    Edema n    Cough    Abdominal Pain n    Sputum n resolved  Joint Pain     SOB/PETERSEN y   Pruritis/Rash     Nausea/vomit    Tolerating PT/OT     Diarrhea    Tolerating Diet y    Constipation    Other       Could NOT obtain due to:      Objective:     VITALS:   Last 24hrs VS reviewed since prior progress note. Most recent are:  Patient Vitals for the past 24 hrs:   Temp Pulse Resp BP SpO2   12/02/19 0819 97.4 °F (36.3 °C) 92 16 156/77 92 %   12/02/19 0736     90 %   12/02/19 0405 97.7 °F (36.5 °C) 90 20 141/69 97 %   12/02/19 0203     100 %   12/02/19 0152     90 %   12/01/19 2359 97.9 °F (36.6 °C) 80 18 142/68 94 %   12/01/19 2206     93 %   12/01/19 2126     93 %   12/01/19 2111     93 %   12/01/19 2106     92 %   12/01/19 2020 97.9 °F (36.6 °C) 87 24 149/72 (!) 88 %   12/01/19 1955   24     12/01/19 1947     (!) 89 %   12/01/19 1827 98.3 °F (36.8 °C) 90  119/75 (!) 18 %   12/01/19 1517 98 °F (36.7 °C) 91 20 131/63 90 %   12/01/19 1457     91 %   12/01/19 1142 98.1 °F (36.7 °C) 87 20 151/50 90 %       Intake/Output Summary (Last 24 hours) at 12/2/2019 1134  Last data filed at 12/2/2019 0723  Gross per 24 hour   Intake 400 ml   Output 1970 ml   Net -1570 ml        PHYSICAL EXAM:  General: WD, WN. Alert, cooperative, no acute distress    EENT:  EOMI. Anicteric sclerae. MMM  Resp:  Coarse breath sounds with diffuse wheezing. No accessory muscle use at rest  CV:  Regular  rhythm,  No edema  GI:  Soft, Non distended, Non tender.  +Bowel sounds  Neurologic:  Alert and oriented X 3, normal speech,   Psych:   Good insight. Not anxious nor agitated  Skin:  No rashes.   No jaundice    Reviewed most current lab test results and cultures  YES  Reviewed most current radiology test results   YES  Review and summation of old records today    NO  Reviewed patient's current orders and MAR    YES  PMH/SH reviewed - no change compared to H&P  ________________________________________________________________________  Care Plan discussed with:    Comments   Patient x    Family      RN x    Care Manager x    Consultant  x Dr. Simin Baca                    x Multidiciplinary team rounds were held today with , nursing, pharmacist and clinical coordinator. Patient's plan of care was discussed; medications were reviewed and discharge planning was addressed. ________________________________________________________________________  Total NON critical care TIME:  25   Minutes    Total CRITICAL CARE TIME Spent:   Minutes non procedure based      Comments   >50% of visit spent in counseling and coordination of care     ________________________________________________________________________  Bonilla Capellan MD     Procedures: see electronic medical records for all procedures/Xrays and details which were not copied into this note but were reviewed prior to creation of Plan. LABS:  I reviewed today's most current labs and imaging studies.   Pertinent labs include:  Recent Labs     12/01/19  0541 11/30/19  0328 11/29/19  1243   WBC 17.6* 19.7* 19.3*   HGB 11.2* 11.7* 12.9   HCT 33.6* 35.7* 38.9    270 275     Recent Labs     12/01/19  0541 11/30/19  0328 11/29/19  1243    135* 138   K 4.5 4.7 4.8    105 102   CO2 21 23 27   * 382* 317*   BUN 35* 48* 49*   CREA 1.16 1.50* 1.96*   CA 8.0* 8.1* 9.0   MG 2.5*  --   --    PHOS 3.2  --   --    ALB 2.4*  --  3.3*   TBILI 0.3  --  0.6   SGOT 40*  --  36   ALT 27  --  17       Signed: Bonilla Capellan MD

## 2019-12-02 NOTE — PROGRESS NOTES
RN unsuccessful with lab draw. Per the patient, nurses have been getting art sticks for labs the last two times for blood draws. Dr. Amaya Macias notified; orders for Art stick. RT attempted art stick x2 & only small amt of blood obtained; pt still needs CBC. Dr. Amaya Macias notified that this is third day of art sticks. Orders to consult PICC team for midline. Msg left with PICC team. PICC team returned call to [address], states that they are very busy and that a midline is not always reliable for blood draws but that someone would come help assist with blood draw when available.

## 2019-12-02 NOTE — PROGRESS NOTES
Called in by respiratory for changed in pt condition. Per respiratory, but now has course crackles throughout lungs and a decrease in oxygen saturation, ~ 83-89 % despite being 4l of oxygen via NC. On assessment, pt with tachypenia,course crackles, expiratory wheeze and generalized edema. IV fluids stopped, oxygen increased to 5L per respiratory and hospitalist paged. Currently awaiting call back. ~[facility] who ordered a chest xray and ABG. She also stated that writer should reach out to ER hospitalist as th patient is complaining of chest pain.    ~2000, writer paged ER physician Dr. Romana Mae and explained the patients current situation. Dr. Romana Mae gave verbal ordes 40mg IV lasix x1. He explained to monitor the patients urine output and and pulse and to call in back in two hours if there in no improvement and pt may be placed on bipap. ABG and xray completed, lasix given and pt placed on 6L of oxygen via NC respiratory. ~2112, spoke with Dr. Romana Mae regarding chest xray and ABG results. He stated that the patient was hypoxic from ABG results. However, If the patient remains comfortable at oxygen current rate and the pt voids that he can stay on the unit. However, if he does not void and needs more oxygen that he would need to be placed on Bipap and transfered to PCU. He stated to continue to monitor patient and that he would schedule next dose of lasix for in the morning. Pt states that he is currently breathing much better. Will continue to monitor.    ~209, Pt received another breathing treatment No longer with course crackles. Now with course lungs sounds and expiratory wheezing on assessment. Pt stating that he feels much better than he did earlier. He currently has a 1450ml urine out. Saturations at 98% on 5l NC. Will continue to monitor. ~0300, called ER hospitalist Dr. Mary Ritter  for a possible order of AM lasix, per last conversation with Dr. Romana Mae. Per. Dr. Maria M Rust, Pt would need a Pro-BNP prior to next dose of lasix and a echo to monitor cardiac function. Orders to be in placed by Dr. Maria M Rust.

## 2019-12-03 ENCOUNTER — APPOINTMENT (OUTPATIENT)
Dept: GENERAL RADIOLOGY | Age: 49
DRG: 871 | End: 2019-12-03
Attending: INTERNAL MEDICINE
Payer: COMMERCIAL

## 2019-12-03 ENCOUNTER — APPOINTMENT (OUTPATIENT)
Dept: NON INVASIVE DIAGNOSTICS | Age: 49
DRG: 871 | End: 2019-12-03
Attending: INTERNAL MEDICINE
Payer: COMMERCIAL

## 2019-12-03 LAB
ATRIAL RATE: 87 BPM
AV VELOCITY RATIO: 0.72
CALCULATED P AXIS, ECG09: 56 DEGREES
CALCULATED R AXIS, ECG10: 31 DEGREES
CALCULATED T AXIS, ECG11: 8 DEGREES
DIAGNOSIS, 93000: NORMAL
ECHO AO ROOT DIAM: 3.1 CM
ECHO AV AREA PEAK VELOCITY: 1.9 CM2
ECHO AV AREA/BSA PEAK VELOCITY: 0.9 CM2/M2
ECHO AV PEAK GRADIENT: 10.7 MMHG
ECHO AV PEAK VELOCITY: 163.35 CM/S
ECHO EST RA PRESSURE: 10 MMHG
ECHO LA AREA 4C: 13.6 CM2
ECHO LA MAJOR AXIS: 2.58 CM
ECHO LA TO AORTIC ROOT RATIO: 0.83
ECHO LA VOL 4C: 28.11 ML (ref 18–58)
ECHO LV E' LATERAL VELOCITY: 11.09 CM/S
ECHO LV E' SEPTAL VELOCITY: 11.44 CM/S
ECHO LV EDV TEICHHOLZ: 0.47 ML
ECHO LV ESV TEICHHOLZ: 0.23 ML
ECHO LV INTERNAL DIMENSION DIASTOLIC: 4.14 CM (ref 4.2–5.9)
ECHO LV INTERNAL DIMENSION SYSTOLIC: 3.06 CM
ECHO LV IVSD: 2.2 CM (ref 0.6–1)
ECHO LV MASS 2D: 332.8 G (ref 88–224)
ECHO LV POSTERIOR WALL DIASTOLIC: 1.06 CM (ref 0.6–1)
ECHO LV POSTERIOR WALL SYSTOLIC: 1.08 CM
ECHO LVOT DIAM: 1.82 CM
ECHO LVOT PEAK GRADIENT: 5.6 MMHG
ECHO LVOT PEAK VELOCITY: 117.99 CM/S
ECHO LVOT SV: 69.3 ML
ECHO LVOT VTI: 26.62 CM
ECHO MV A VELOCITY: 144.99 CM/S
ECHO MV AREA VTI: 1.9 CM2
ECHO MV E DECELERATION TIME (DT): 207.7 MS
ECHO MV E VELOCITY: 132.92 CM/S
ECHO MV E/A RATIO: 0.92
ECHO MV E/E' LATERAL: 11.99
ECHO MV E/E' RATIO (AVERAGED): 11.8
ECHO MV E/E' SEPTAL: 11.62
ECHO MV MAX VELOCITY: 133.41 CM/S
ECHO MV MEAN GRADIENT: 3.4 MMHG
ECHO MV MEAN INFLOW VELOCITY: 0.85 M/S
ECHO MV PEAK GRADIENT: 7.1 MMHG
ECHO MV REGURGITANT RADIUS PISA: 0.45 CM
ECHO MV VTI: 36.7 CM
ECHO PULMONARY ARTERY SYSTOLIC PRESSURE (PASP): 33.9 MMHG
ECHO RA AREA 4C: 11.2 CM2
ECHO RIGHT VENTRICULAR SYSTOLIC PRESSURE (RVSP): 33.9 MMHG
ECHO TV REGURGITANT MAX VELOCITY: 244.69 CM/S
ECHO TV REGURGITANT PEAK GRADIENT: 23.9 MMHG
GLUCOSE BLD STRIP.AUTO-MCNC: 100 MG/DL (ref 65–100)
GLUCOSE BLD STRIP.AUTO-MCNC: 114 MG/DL (ref 65–100)
GLUCOSE BLD STRIP.AUTO-MCNC: 121 MG/DL (ref 65–100)
GLUCOSE BLD STRIP.AUTO-MCNC: 126 MG/DL (ref 65–100)
GLUCOSE BLD STRIP.AUTO-MCNC: 143 MG/DL (ref 65–100)
GLUCOSE BLD STRIP.AUTO-MCNC: 153 MG/DL (ref 65–100)
GLUCOSE BLD STRIP.AUTO-MCNC: 176 MG/DL (ref 65–100)
LVFS 2D: 26.04 %
LVOT MG: 2.7 MMHG
LVOT MV: 0.75 CM/S
LVSV (TEICH): 18.66 ML
MV DEC SLOPE: 6.4
P-R INTERVAL, ECG05: 160 MS
Q-T INTERVAL, ECG07: 366 MS
QRS DURATION, ECG06: 100 MS
QTC CALCULATION (BEZET), ECG08: 440 MS
SERVICE CMNT-IMP: ABNORMAL
SERVICE CMNT-IMP: NORMAL
VENTRICULAR RATE, ECG03: 87 BPM

## 2019-12-03 PROCEDURE — 74011250637 HC RX REV CODE- 250/637: Performed by: INTERNAL MEDICINE

## 2019-12-03 PROCEDURE — 65660000000 HC RM CCU STEPDOWN

## 2019-12-03 PROCEDURE — 74011250636 HC RX REV CODE- 250/636: Performed by: INTERNAL MEDICINE

## 2019-12-03 PROCEDURE — 74011636637 HC RX REV CODE- 636/637: Performed by: INTERNAL MEDICINE

## 2019-12-03 PROCEDURE — 74011000258 HC RX REV CODE- 258: Performed by: INTERNAL MEDICINE

## 2019-12-03 PROCEDURE — 94640 AIRWAY INHALATION TREATMENT: CPT

## 2019-12-03 PROCEDURE — 71045 X-RAY EXAM CHEST 1 VIEW: CPT

## 2019-12-03 PROCEDURE — 0099U RESPIRATORY PANEL,PCR,NASOPHARYNGEAL: CPT

## 2019-12-03 PROCEDURE — 93306 TTE W/DOPPLER COMPLETE: CPT

## 2019-12-03 PROCEDURE — 94760 N-INVAS EAR/PLS OXIMETRY 1: CPT

## 2019-12-03 PROCEDURE — 74011000250 HC RX REV CODE- 250: Performed by: INTERNAL MEDICINE

## 2019-12-03 PROCEDURE — 87147 CULTURE TYPE IMMUNOLOGIC: CPT

## 2019-12-03 PROCEDURE — 87070 CULTURE OTHR SPECIMN AEROBIC: CPT

## 2019-12-03 PROCEDURE — 87899 AGENT NOS ASSAY W/OPTIC: CPT

## 2019-12-03 PROCEDURE — 77010033678 HC OXYGEN DAILY

## 2019-12-03 PROCEDURE — 82962 GLUCOSE BLOOD TEST: CPT

## 2019-12-03 PROCEDURE — 87449 NOS EACH ORGANISM AG IA: CPT

## 2019-12-03 RX ORDER — INSULIN GLARGINE 100 [IU]/ML
60 INJECTION, SOLUTION SUBCUTANEOUS DAILY
Status: DISCONTINUED | OUTPATIENT
Start: 2019-12-03 | End: 2019-12-04

## 2019-12-03 RX ORDER — BUDESONIDE 0.5 MG/2ML
500 INHALANT ORAL
Status: DISCONTINUED | OUTPATIENT
Start: 2019-12-03 | End: 2019-12-04

## 2019-12-03 RX ORDER — LEVALBUTEROL INHALATION SOLUTION 0.63 MG/3ML
1.25 SOLUTION RESPIRATORY (INHALATION)
Status: DISCONTINUED | OUTPATIENT
Start: 2019-12-03 | End: 2019-12-04

## 2019-12-03 RX ORDER — OXYMETAZOLINE HCL 0.05 %
2 SPRAY, NON-AEROSOL (ML) NASAL
Status: DISCONTINUED | OUTPATIENT
Start: 2019-12-03 | End: 2019-12-08 | Stop reason: HOSPADM

## 2019-12-03 RX ADMIN — GUAIFENESIN 600 MG: 600 TABLET, EXTENDED RELEASE ORAL at 08:46

## 2019-12-03 RX ADMIN — DULOXETINE 30 MG: 30 CAPSULE, DELAYED RELEASE ORAL at 17:50

## 2019-12-03 RX ADMIN — POTASSIUM CHLORIDE 10 MEQ: 750 TABLET, FILM COATED, EXTENDED RELEASE ORAL at 08:46

## 2019-12-03 RX ADMIN — ASPIRIN 81 MG 81 MG: 81 TABLET ORAL at 08:45

## 2019-12-03 RX ADMIN — Medication 10 ML: at 04:20

## 2019-12-03 RX ADMIN — POTASSIUM CHLORIDE 10 MEQ: 750 TABLET, FILM COATED, EXTENDED RELEASE ORAL at 17:50

## 2019-12-03 RX ADMIN — Medication 10 ML: at 20:01

## 2019-12-03 RX ADMIN — LEVALBUTEROL HYDROCHLORIDE 0.63 MG: 0.63 SOLUTION RESPIRATORY (INHALATION) at 13:37

## 2019-12-03 RX ADMIN — INSULIN LISPRO 12 UNITS: 100 INJECTION, SOLUTION INTRAVENOUS; SUBCUTANEOUS at 13:29

## 2019-12-03 RX ADMIN — LEVALBUTEROL HYDROCHLORIDE 0.63 MG: 0.63 SOLUTION RESPIRATORY (INHALATION) at 02:41

## 2019-12-03 RX ADMIN — LEVALBUTEROL HYDROCHLORIDE 0.63 MG: 0.63 SOLUTION RESPIRATORY (INHALATION) at 19:50

## 2019-12-03 RX ADMIN — INSULIN LISPRO 3 UNITS: 100 INJECTION, SOLUTION INTRAVENOUS; SUBCUTANEOUS at 13:29

## 2019-12-03 RX ADMIN — BUDESONIDE 500 MCG: 0.5 INHALANT RESPIRATORY (INHALATION) at 19:50

## 2019-12-03 RX ADMIN — INSULIN GLARGINE 60 UNITS: 100 INJECTION, SOLUTION SUBCUTANEOUS at 08:44

## 2019-12-03 RX ADMIN — HEPARIN SODIUM 5000 UNITS: 5000 INJECTION INTRAVENOUS; SUBCUTANEOUS at 04:19

## 2019-12-03 RX ADMIN — INSULIN LISPRO 12 UNITS: 100 INJECTION, SOLUTION INTRAVENOUS; SUBCUTANEOUS at 08:44

## 2019-12-03 RX ADMIN — METOPROLOL TARTRATE 12.5 MG: 25 TABLET ORAL at 17:50

## 2019-12-03 RX ADMIN — FAMOTIDINE 10 MG: 20 TABLET ORAL at 08:45

## 2019-12-03 RX ADMIN — HEPARIN SODIUM 5000 UNITS: 5000 INJECTION INTRAVENOUS; SUBCUTANEOUS at 20:00

## 2019-12-03 RX ADMIN — Medication 1 CAPSULE: at 08:45

## 2019-12-03 RX ADMIN — LEVALBUTEROL HYDROCHLORIDE 0.63 MG: 0.63 SOLUTION RESPIRATORY (INHALATION) at 07:35

## 2019-12-03 RX ADMIN — CEFTRIAXONE SODIUM 2 G: 2 INJECTION, POWDER, FOR SOLUTION INTRAMUSCULAR; INTRAVENOUS at 16:51

## 2019-12-03 RX ADMIN — PREDNISONE 30 MG: 20 TABLET ORAL at 16:04

## 2019-12-03 RX ADMIN — AZITHROMYCIN MONOHYDRATE 500 MG: 500 INJECTION, POWDER, LYOPHILIZED, FOR SOLUTION INTRAVENOUS at 15:02

## 2019-12-03 RX ADMIN — INSULIN LISPRO 12 UNITS: 100 INJECTION, SOLUTION INTRAVENOUS; SUBCUTANEOUS at 16:52

## 2019-12-03 RX ADMIN — FAMOTIDINE 10 MG: 20 TABLET ORAL at 17:50

## 2019-12-03 RX ADMIN — FUROSEMIDE 40 MG: 10 INJECTION, SOLUTION INTRAMUSCULAR; INTRAVENOUS at 08:44

## 2019-12-03 RX ADMIN — AMLODIPINE BESYLATE 2.5 MG: 2.5 TABLET ORAL at 08:45

## 2019-12-03 RX ADMIN — DULOXETINE 30 MG: 30 CAPSULE, DELAYED RELEASE ORAL at 08:45

## 2019-12-03 RX ADMIN — GUAIFENESIN 600 MG: 600 TABLET, EXTENDED RELEASE ORAL at 19:59

## 2019-12-03 RX ADMIN — HEPARIN SODIUM 5000 UNITS: 5000 INJECTION INTRAVENOUS; SUBCUTANEOUS at 13:28

## 2019-12-03 RX ADMIN — FUROSEMIDE 40 MG: 10 INJECTION, SOLUTION INTRAMUSCULAR; INTRAVENOUS at 17:50

## 2019-12-03 RX ADMIN — METOPROLOL TARTRATE 12.5 MG: 25 TABLET ORAL at 08:46

## 2019-12-03 RX ADMIN — Medication 10 ML: at 13:30

## 2019-12-03 NOTE — PROGRESS NOTES
ADULT PROTOCOL: JET AEROSOL  REASSESSMENT    Patient  Crystal Spear     46 y.o.   male     12/3/2019  3:02 AM    Breath Sounds Pre Procedure: Right Breath Sounds: Coarse, Expiratory wheezing                               Left Breath Sounds: Coarse, Expiratory wheezing    Breath Sounds Post Procedure: Right Breath Sounds: Coarse, Expiratory wheezing                                 Left Breath Sounds: Coarse, Expiratory wheezing    Breathing pattern: Pre procedure Breathing Pattern: Regular          Post procedure Breathing Pattern: Regular    Heart Rate: Pre procedure Pulse: 82           Post procedure Pulse: 88    Resp Rate: Pre procedure Respirations: 16           Post procedure Respirations: 18      Cough: Pre procedure Cough: Non-productive               Post procedure Cough: Non-productive    Suctioned: NO      Oxygen: O2 Device: Nasal cannula   5L     Changed: NO    SpO2: Pre procedure SpO2: 96 %   with oxygen              Post procedure SpO2: 93 %  with oxygen    Nebulizer Therapy: Current medications Aerosolized Medications: Xopenex      Changed: NO    Smoking History: never smoked    Problem List:   Patient Active Problem List   Diagnosis Code    Unspecified cerebral artery occlusion with cerebral infarction I63.50    Type I diabetes mellitus, uncontrolled ([facility] E10.65    Essential hypertension, benign I10    Hyperlipidemia LDL goal <70 E78.5    ED (erectile dysfunction) N52.9    Vitamin D deficiency E55.9    Type 2 diabetes with nephropathy (HCC) E11.21    Severe obesity (HCC) E66.01    Bilateral pneumonia J18.9    Severe sepsis (HCC) A41.9, R65.20    GABY (acute kidney injury) ([facility] N17.9       Respiratory Therapist: Caridad Good, RT

## 2019-12-03 NOTE — PROGRESS NOTES
Problem: Patient Education: Go to Patient Education Activity  Goal: Patient/Family Education  Outcome: Progressing Towards Goal     Problem: Pneumonia: Day 1  Goal: Off Pathway (Use only if patient is Off Pathway)  Outcome: Progressing Towards Goal  Goal: Activity/Safety  Outcome: Progressing Towards Goal  Goal: Consults, if ordered  Outcome: Progressing Towards Goal  Goal: Diagnostic Test/Procedures  Outcome: Progressing Towards Goal  Goal: Nutrition/Diet  Outcome: Progressing Towards Goal  Goal: Medications  Outcome: Progressing Towards Goal  Goal: Respiratory  Outcome: Progressing Towards Goal  Goal: Treatments/Interventions/Procedures  Outcome: Progressing Towards Goal  Goal: Psychosocial  Outcome: Progressing Towards Goal  Goal: *Oxygen saturation within defined limits  Outcome: Progressing Towards Goal  Goal: *Influenza vaccine administered (October-March)  Outcome: Progressing Towards Goal  Goal: *Pneumoccocal vaccine administered  Outcome: Progressing Towards Goal  Goal: *Hemodynamically stable  Outcome: Progressing Towards Goal  Goal: *Demonstrates progressive activity  Outcome: Progressing Towards Goal  Goal: *Tolerating diet  Outcome: Progressing Towards Goal     Problem: Pneumonia: Day 2  Goal: Off Pathway (Use only if patient is Off Pathway)  Outcome: Progressing Towards Goal  Goal: Activity/Safety  Outcome: Progressing Towards Goal  Goal: Consults, if ordered  Outcome: Progressing Towards Goal  Goal: Diagnostic Test/Procedures  Outcome: Progressing Towards Goal  Goal: Nutrition/Diet  Outcome: Progressing Towards Goal  Goal: Discharge Planning  Outcome: Progressing Towards Goal  Goal: Medications  Outcome: Progressing Towards Goal  Goal: Respiratory  Outcome: Progressing Towards Goal  Goal: Treatments/Interventions/Procedures  Outcome: Progressing Towards Goal  Goal: Psychosocial  Outcome: Progressing Towards Goal  Goal: *Oxygen saturation within defined limits  Outcome: Progressing Towards Goal  Goal: *Hemodynamically stable  Outcome: Progressing Towards Goal  Goal: *Demonstrates progressive activity  Outcome: Progressing Towards Goal  Goal: *Tolerating diet  Outcome: Progressing Towards Goal  Goal: *Optimal pain control at patient's stated goal  Outcome: Progressing Towards Goal     Problem: Pneumonia: Day 3  Goal: Off Pathway (Use only if patient is Off Pathway)  Outcome: Progressing Towards Goal  Goal: Activity/Safety  Outcome: Progressing Towards Goal  Goal: Consults, if ordered  Outcome: Progressing Towards Goal  Goal: Diagnostic Test/Procedures  Outcome: Progressing Towards Goal  Goal: Nutrition/Diet  Outcome: Progressing Towards Goal  Goal: Discharge Planning  Outcome: Progressing Towards Goal  Goal: Medications  Outcome: Progressing Towards Goal  Goal: Respiratory  Outcome: Progressing Towards Goal  Goal: Treatments/Interventions/Procedures  Outcome: Progressing Towards Goal  Goal: Psychosocial  Outcome: Progressing Towards Goal  Goal: *Oxygen saturation within defined limits  Outcome: Progressing Towards Goal  Goal: *Hemodynamically stable  Outcome: Progressing Towards Goal  Goal: *Demonstrates progressive activity  Outcome: Progressing Towards Goal  Goal: *Tolerating diet  Outcome: Progressing Towards Goal  Goal: *Describes available resources and support systems  Outcome: Progressing Towards Goal  Goal: *Optimal pain control at patient's stated goal  Outcome: Progressing Towards Goal     Problem: Pneumonia: Day 4  Goal: Off Pathway (Use only if patient is Off Pathway)  Outcome: Progressing Towards Goal  Goal: Activity/Safety  Outcome: Progressing Towards Goal  Goal: Nutrition/Diet  Outcome: Progressing Towards Goal  Goal: Discharge Planning  Outcome: Progressing Towards Goal  Goal: Medications  Outcome: Progressing Towards Goal  Goal: Respiratory  Outcome: Progressing Towards Goal  Goal: Treatments/Interventions/Procedures  Outcome: Progressing Towards Goal  Goal: Psychosocial  Outcome: Progressing Towards Goal     Problem: Pneumonia: Discharge Outcomes  Goal: *Demonstrates progressive activity  Outcome: Progressing Towards Goal  Goal: *Describes follow-up/return visits to physicians  Outcome: Progressing Towards Goal  Goal: *Tolerating diet  Outcome: Progressing Towards Goal  Goal: *Verbalizes name, dosage, time, side effects, and number of days to continue medications  Outcome: Progressing Towards Goal  Goal: *Influenza immunization  Outcome: Progressing Towards Goal  Goal: *Pneumococcal immunization  Outcome: Progressing Towards Goal  Goal: *Respiratory status at baseline  Outcome: Progressing Towards Goal  Goal: *Vital signs within defined limits  Outcome: Progressing Towards Goal  Goal: *Describes available resources and support systems  Outcome: Progressing Towards Goal  Goal: *Optimal pain control at patient's stated goal  Outcome: Progressing Towards Goal     Problem: Patient Education: Go to Patient Education Activity  Goal: Patient/Family Education  Outcome: Progressing Towards Goal     Problem: Falls - Risk of  Goal: *Absence of Falls  Description  Document [place] Fall Risk and appropriate interventions in the flowsheet.   Outcome: Progressing Towards Goal  Note: Fall Risk Interventions:  Mobility Interventions: Patient to call before getting OOB         Medication Interventions: Patient to call before getting OOB, Teach patient to arise slowly

## 2019-12-03 NOTE — PROGRESS NOTES
Progress Note      12/3/2019 8:11 AM  NAME: Argentina Gomez   MRN:  [id number]   Admit Diagnosis: Bilateral pneumonia [J18.9]  Severe sepsis ([facility] [address] [A41.9, R65.20]  GABY (acute kidney injury) ([facility] [N17.9]                Assessment:       Cough, chills, pneumonia, leukocytosis, GABY  Acute diastolic chf vs. ARDS.    - No hx of CAD  - Echo 2011 EF 60%, Echo 12/2019 EF 55%, mild to mod MR  - sinus  - Hypertensive heart disease with CKD  - Insulin DM Dr. Claude Fendt  - Dyslipidemia  - CVA in 2011  - Obese/Snores, recommended sleep study  , 2 kids, works for [employer] and as an [employer], physical job                     Plan:        Initial presentation consistent with pneumonia with fever, chills, leukocytosis. GABY, HCTZ held and given diuretic, hydration, but then possible acute diastolic chf.    Echo normal EF, mild to mod MR  Outpt sleep study  Check repeat CXR     1. Cont ASA  2. Cont added metoprolol 12.5mg bid  3. Cont amlodipine 2.5mg daily, was on 10mg as outpt  4. Holding lisinopril, resume prior to discharge  5. Stop HCTZ, Agree with lasix 40mg iv bid, follow bmp  6. Cont crestor     No edema, still with wheezing, will check follow up CXR, not convinced this is CHF, could be component of ARDS. Discussed with hospitalist, needs daily labs, may need better access.          [x]? High complexity decision making was performed            Subjective:     Argentina Gomez denies chest pain, +dyspnea. Discussed with RN events overnight. Review of Systems:    Symptom Y/N Comments  Symptom Y/N Comments   Fever/Chills N   Chest Pain N    Poor Appetite N   Edema N    Cough N   Abdominal Pain N    Sputum N   Joint Pain N    SOB/PETERSEN Y   Pruritis/Rash N    Nausea/vomit N   Tolerating PT/OT Y    Diarrhea N   Tolerating Diet Y    Constipation N   Other       Could NOT obtain due to:      Objective:      Physical Exam:    Last 24hrs VS reviewed since prior progress note.  Most recent are:    Visit Vitals  /73 Pulse 87   Temp 97.7 °F (36.5 °C)   Resp 20   Wt 97.1 kg (214 lb)   SpO2 94%   BMI 36.73 kg/m²       Intake/Output Summary (Last 24 hours) at 12/3/2019 6782  Last data filed at 12/3/2019 0000  Gross per 24 hour   Intake 480 ml   Output 3425 ml   Net -2945 ml        General Appearance: Well developed, well nourished, alert & oriented x 3,    no acute distress. Ears/Nose/Mouth/Throat: Hearing grossly normal.  Neck: Supple. Chest: Lungs diffuse wheeze to auscultation bilaterally. Cardiovascular: Regular rate and rhythm, S1S2 normal, no murmur. Abdomen: Soft, non-tender, bowel sounds are active. Extremities: No edema bilaterally. Skin: Warm and dry. PMH/SH reviewed - no change compared to H&P    Data Review    Telemetry: normal sinus rhythm     Lab Data Personally Reviewed:    Recent Labs     12/02/19  1648 12/01/19  0541   WBC 27.0* 17.6*   HGB 12.5 11.2*   HCT 35.9* 33.6*    274     No results for input(s): INR, PTP, APTT, INREXT in the last 72 hours. Recent Labs     12/02/19  1319 12/01/19  0541   * 138   K 4.4 4.5    107   CO2 19* 21   BUN 38* 35*   CREA 1.23 1.16   * 185*   CA 7.8* 8.0*   MG  --  2.5*     No results for input(s): CPK, CKNDX, TROIQ in the last 72 hours. No lab exists for component: CPKMB  Lab Results   Component Value Date/Time    Cholesterol, total 190 10/22/2019 09:01 AM    HDL Cholesterol 37 (L) 10/22/2019 09:01 AM    LDL,Direct 171 (H) 09/07/2011 03:46 AM    LDL-CHOLESTEROL 123 (H) 10/22/2019 09:01 AM    LDL, calculated 112 (H) 09/11/2017 08:55 AM    Triglyceride 186 (H) 10/22/2019 09:01 AM    CHOL/HDL Ratio 8.0 (H) 09/08/2011 04:21 AM    Cholesterol/HDL ratio 5.1 (H) 10/22/2019 09:01 AM       Recent Labs     12/01/19  0541   SGOT 40*      TP 6.7   ALB 2.4*   GLOB 4.3*     No results for input(s): PH, PCO2, PO2 in the last 72 hours.     Medications Personally Reviewed:    Current Facility-Administered Medications   Medication Dose Route Frequency  furosemide (LASIX) injection 40 mg  40 mg IntraVENous BID    famotidine (PEPCID) tablet 10 mg  10 mg Oral BID    potassium chloride SR (KLOR-CON 10) tablet 10 mEq  10 mEq Oral BID    amLODIPine (NORVASC) tablet 2.5 mg  2.5 mg Oral DAILY    insulin lispro (HUMALOG) injection 12 Units  12 Units SubCUTAneous TIDAC    insulin glargine (LANTUS) injection 100 Units  100 Units SubCUTAneous DAILY    insulin lispro (HUMALOG) injection   SubCUTAneous Q4H    glucose chewable tablet 16 g  4 Tab Oral PRN    dextrose (D50W) injection syrg 12.5-25 g  12.5-25 g IntraVENous PRN    zolpidem (AMBIEN) tablet 5 mg  5 mg Oral QHS PRN    levalbuterol (XOPENEX) nebulizer soln 0.63 mg/3 mL  0.63 mg Nebulization Q6H RT    metoprolol tartrate (LOPRESSOR) tablet 12.5 mg  12.5 mg Oral BID    cefTRIAXone (ROCEPHIN) 2 g in 0.9% sodium chloride (MBP/ADV) 50 mL  2 g IntraVENous Q24H    azithromycin (ZITHROMAX) 500 mg in 0.9% sodium chloride (MBP/ADV) 250 mL  500 mg IntraVENous Q24H    acetaminophen (TYLENOL) tablet 650 mg  650 mg Oral Q6H PRN    ondansetron (ZOFRAN) injection 4 mg  4 mg IntraVENous Q4H PRN    aspirin chewable tablet 81 mg  81 mg Oral DAILY    DULoxetine (CYMBALTA) capsule 30 mg  30 mg Oral BID    dextrose (D50) infusion 12.5-25 g  12.5-25 g IntraVENous PRN    glucagon (GLUCAGEN) injection 1 mg  1 mg IntraMUSCular PRN    sodium chloride (NS) flush 5-40 mL  5-40 mL IntraVENous Q8H    sodium chloride (NS) flush 5-40 mL  5-40 mL IntraVENous PRN    heparin (porcine) injection 5,000 Units  5,000 Units SubCUTAneous Q8H    lactobac ac& pc-s.therm-b.anim (JASON Q/RISAQUAD)  1 Cap Oral DAILY    guaiFENesin ER (MUCINEX) tablet 600 mg  600 mg Oral Q12H    guaiFENesin-dextromethorphan (ROBITUSSIN DM) 100-10 mg/5 mL syrup 10 mL  10 mL Oral Q6H PRN         Burt Avalos MD

## 2019-12-03 NOTE — CONSULTS
[facility]  Pulmonary, Critical Care, and Sleep Medicine    Initial Patient Consult    Name: Fabiola Burden MRN: [id number]   : 1970 Hospital: [facility]   Date: 12/3/2019        IMPRESSION:   · Pneumonia- ?cap or atypical or viral - no vaping . - has evolved suspect \"Leaky lungs\". · Acute hypoxic resp failure - sat 91 to 93 % on 5 liter nc. · Diabetic  · Suspected ady  · Mild uli  · Leukocytosis  · Bronchospasm-asthmatic bronchitis  · Mild to moderate mr with pasp 33   · Elevated probnp       RECOMMENDATIONS:   · Agree with the comprehensive care provided by hospitalist team.   · o2   · No need for fob at this time- not suspected to be increased risk for opportunistic infection   · Rt / nebs   ·  continue close monitoring  · Agree with careful diuresis in view of suspected leaky lungs   · Agree with viral panel, urine ag   · Will send hypersensitivity panel to cover bases   · He says hiv status is neg but is not sure when it was checked. . By hx  Extremely low risk  So will hold on checking for now . · Agree with restarting  Steroid- may just try po pred and continue bls monitoring. · Continue current ab      Subjective: This patient has been seen and evaluated at the request of Dr. Cheryl Hong  For acute hypoxic resp failure and possible ards. . Patient is a 46 y.o. male  Who resport a cough for about 2 week. Sputum was initially brown with no heme. He progressed on thanksgiving day with chills , increased sob and worsening fatigue. He presented  And was admitted on . Cxr has progressed from  to  . He required increased o2 and  Is currently at 5 liter nc. He says he feels better. He has been comprehensively managed. So far bc ng . Sick contact was his wife who returned from a trip out of state where several family members were sick and then she got sick with a colde that evolved into bronchitis.   ( She was on ab and pred and inhaler. ) Pt denies smoking,  Vaping. He admits to exposure to nitric acid, hydrochloric acid , acetate and natha in his occupation as an  but says the area is well ventilated and he has been around this for years. Past Medical History:   Diagnosis Date    Diabetes ([address]     Other and unspecified hyperlipidemia     Stroke [facility])     TIA September 2011    TIA (transient ischemic attack) Sept 2011    Type I (juvenile type) diabetes mellitus without mention of complication, uncontrolled Age 8    Unspecified essential hypertension     Unspecified vitamin D deficiency 2/27/2012      Past Surgical History:   Procedure Laterality Date    HX CATARACT REMOVAL Left 03/2019      Prior to Admission medications    Medication Sig Start Date End Date Taking? Authorizing Provider   DULoxetine (CYMBALTA) 30 mg capsule Take 1 Cap by mouth two (2) times a day. 11/22/19  Yes Ysabel Jj MD   hydroCHLOROthiazide (HYDRODIURIL) 25 mg tablet TAKE 1 TABLET BY MOUTH EVERY DAY 11/7/19  Yes Ysabel Jj, MD   lisinopril (PRINIVIL, ZESTRIL) 40 mg tablet TAKE 1 TABLET DAILY 11/2/19  Yes Ysabel Jj, MD   insulin aspart U-100 (NOVOLOG FLEXPEN U-100 INSULIN) 100 unit/mL (3 mL) inpn FOR DIRECTIONS ON HOW TO   TAKE THIS MEDICINE, READ   THE ENCLOSED MEDICATION    INFORMATION FORM 11/2/19  Yes Ysabel Jj MD   amLODIPine (NORVASC) 10 mg tablet TAKE 1 TABLET DAILY 11/2/19  Yes Ysabel Jj MD   naproxen (NAPROSYN) 500 mg tablet Take 500 mg by mouth as needed. 10/30/19  Yes Provider, Historical   b complex vitamins (B COMPLEX 1) tablet Take 1 Tab by mouth daily.    Yes Provider, Historical   TRESIBA FLEXTOUCH U-200 200 unit/mL (3 mL) inpn Inject 100 units in the morning--dispense 18 pens for 90 day supply--Dose change 11/1/19--updated med list--did not send prescription to the pharmacy 11/1/19  Yes Ysabel Jj, MD   rosuvastatin (CRESTOR) 20 mg tablet Take 1/2 tab daily--Dose change 1/17/19--updated med list--did not send prescription to the pharmacy 1/17/19  Yes Maricarmen Hall MD   Insulin Needles, Disposable, (BD ULTRA-FINE MINI PEN NEEDLE) 31 gauge x 3/16\" ndle Use as directed 3 times daily 10/30/18  Yes Maricarmen Hall MD   insulin syringe-needle U-100 1 mL 31 gauge x 15/64\" syrg 1 Syringe by SubCUTAneous route five (5) times daily. 7/2/18  Yes Maricarmen Hall MD   aspirin 81 mg chewable tablet Take 81 mg by mouth daily. Yes Provider, Historical   ONETOUCH ULTRA TEST strip Test up to 6 times daily. Dx: 250.03 4/2/15  Yes Maricarmen Hall MD   [facility]. 33 gauge misc Use as directed up to 6 times daily. Dx: 250.03 4/2/15  Yes Maricarmen Hall MD   cholecalciferol, vitamin D3, (VITAMIN D3) 2,000 unit Tab Take  by mouth daily. Yes Provider, Historical   MV-MN/FA/D3/LYCOPENE/LUT/COQ10 (DAILY MULTIVITAMIN PO) Take  by mouth. Yes Provider, Historical   TRESIBA FLEXTOUCH U-200 200 unit/mL (3 mL) inpn INJECT 110  UNITS IN THE MORNING 11/2/19   Maricarmen Hall MD     No Known Allergies   Social History     Tobacco Use    Smoking status: Never Smoker    Smokeless tobacco: Never Used   Substance Use Topics    Alcohol use: Yes     Alcohol/week: 1.0 standard drinks     Types: 1 Cans of beer per week     Comment: Rare beer   , wife was present, works as an  and works with [employer] .   Family History   Problem Relation Age of Onset    Cancer Father         Bone cancer    Heart Attack Maternal Grandmother     Heart Attack Maternal Grandfather     Stroke Maternal Grandfather     Heart Attack Paternal Grandmother     Heart Attack Paternal Grandfather     Diabetes Other         2 nieces with Type 1 diabetes    Heart Disease Neg Hx     vaccines- did not get the flu shot this year. Has not had the pneumovax.      Current Facility-Administered Medications   Medication Dose Route Frequency    insulin glargine (LANTUS) injection 60 Units  60 Units SubCUTAneous DAILY    furosemide (LASIX) injection 40 mg  40 mg IntraVENous BID    famotidine (PEPCID) tablet 10 mg  10 mg Oral BID    potassium chloride SR (KLOR-CON 10) tablet 10 mEq  10 mEq Oral BID    amLODIPine (NORVASC) tablet 2.5 mg  2.5 mg Oral DAILY    insulin lispro (HUMALOG) injection 12 Units  12 Units SubCUTAneous TIDAC    insulin lispro (HUMALOG) injection   SubCUTAneous Q4H    levalbuterol (XOPENEX) nebulizer soln 0.63 mg/3 mL  0.63 mg Nebulization Q6H RT    metoprolol tartrate (LOPRESSOR) tablet 12.5 mg  12.5 mg Oral BID    cefTRIAXone (ROCEPHIN) 2 g in 0.9% sodium chloride (MBP/ADV) 50 mL  2 g IntraVENous Q24H    azithromycin (ZITHROMAX) 500 mg in 0.9% sodium chloride (MBP/ADV) 250 mL  500 mg IntraVENous Q24H    aspirin chewable tablet 81 mg  81 mg Oral DAILY    DULoxetine (CYMBALTA) capsule 30 mg  30 mg Oral BID    sodium chloride (NS) flush 5-40 mL  5-40 mL IntraVENous Q8H    heparin (porcine) injection 5,000 Units  5,000 Units SubCUTAneous Q8H    lactobac ac& pc-s.therm-b.anim (JASON Q/RISAQUAD)  1 Cap Oral DAILY    guaiFENesin ER (MUCINEX) tablet 600 mg  600 mg Oral Q12H       Review of Systems:  A comprehensive review of systems was negative except for that written in the HPI.     Objective:   Vital Signs:    Visit Vitals  /70   Pulse 93   Temp 97.7 °F (36.5 °C)   Resp 20   Wt 97.1 kg (214 lb)   SpO2 95%   BMI 36.73 kg/m²       O2 Device: Nasal cannula   O2 Flow Rate (L/min): 5 l/min   Temp (24hrs), Av.8 °F (36.6 °C), Min:97.7 °F (36.5 °C), Max:98.1 °F (36.7 °C)       Intake/Output:   Last shift:      701 - 1900  In: -   Out: 900 [Urine:900]  Last 3 shifts: 1901 -  0700  In: 480 [P.O.:480]  Out: 6371 [Urine:5395]    Intake/Output Summary (Last 24 hours) at 12/3/2019 1447  Last data filed at 12/3/2019 1339  Gross per 24 hour   Intake 480 ml   Output 3150 ml   Net -2670 ml      Physical Exam:   General:  Alert, cooperative, no distress, appears stated age. Stocky    Head:  Normocephalic, without obvious abnormality, atraumatic. Eyes:  Conjunctivae/corneas clear. PERRL, EOMs intact. Nose: Nares normal. Septum midline. Mucosa normal. No drainage or sinus tenderness. Throat: Lips, mucosa, and tongue normal. Teeth and gums normal. M4 airway    Neck: Supple, symmetrical, trachea midline, no adenopathy, broad   Back:   Symmetric, no curvature. ROM normal.   Lungs:    scattered fine rales 1/2up and mild wheezes    Chest wall:  No tenderness or deformity. Heart:  Regular rate and rhythm, S1, S2 normal, no murmur, click, rub or gallop. softmurmur   Abdomen:   Soft, non-tender. Extremities: Extremities normal, atraumatic, no cyanosis .  Mild pedal edema    Pulses: Not examined    Skin: Skin color, texture, turgor normal. No rashes or lesions   Lymph nodes: Cervical, supraclavicular,  nodes normal.   Neurologic: Grossly nonfocal. Alert and oriented      Data review:     Recent Results (from the past 24 hour(s))   EKG, 12 LEAD, SUBSEQUENT    Collection Time: 12/02/19  2:58 PM   Result Value Ref Range    Ventricular Rate 87 BPM    Atrial Rate 87 BPM    P-R Interval 160 ms    QRS Duration 100 ms    Q-T Interval 366 ms    QTC Calculation (Bezet) 440 ms    Calculated P Axis 56 degrees    Calculated R Axis 31 degrees    Calculated T Axis 8 degrees    Diagnosis       Baseline artifact  Normal sinus rhythm    Nonspecific ST abnormality  Confirmed by Romana Ontiveros ([id number]) on 12/3/2019 1:46:35 PM     GLUCOSE, POC    Collection Time: 12/02/19  4:05 PM   Result Value Ref Range    Glucose (POC) 96 65 - 100 mg/dL    Performed by Kesha Rae    CBC W/O DIFF    Collection Time: 12/02/19  4:48 PM   Result Value Ref Range    WBC 27.0 (H) 4.1 - 11.1 K/uL    RBC 3.89 (L) 4.10 - 5.70 M/uL    HGB 12.5 12.1 - 17.0 g/dL    HCT 35.9 (L) 36.6 - 50.3 %    MCV 92.3 80.0 - 99.0 FL    MCH 32.1 26.0 - 34.0 PG    MCHC 34.8 30.0 - 36.5 g/dL    RDW 12.7 11.5 - 14.5 %    PLATELET 306 676 - 009 K/uL    MPV 11.5 8.9 - 12.9 FL    NRBC 0.0 0  WBC    ABSOLUTE NRBC 0.00 0.00 - 0.01 K/uL   GLUCOSE, POC    Collection Time: 12/02/19  9:04 PM   Result Value Ref Range    Glucose (POC) 123 (H) 65 - 100 mg/dL    Performed by Selene 28, POC    Collection Time: 12/03/19 12:56 AM   Result Value Ref Range    Glucose (POC) 126 (H) 65 - 100 mg/dL    Performed by Kimmie Wilson (RN)    GLUCOSE, POC    Collection Time: 12/03/19  4:22 AM   Result Value Ref Range    Glucose (POC) 121 (H) 65 - 100 mg/dL    Performed by Kimmie Wilson (CLYDE)    GLUCOSE, POC    Collection Time: 12/03/19  8:37 AM   Result Value Ref Range    Glucose (POC) 100 65 - 100 mg/dL    Performed by Padopal Harness (PCT)    GLUCOSE, POC    Collection Time: 12/03/19 11:19 AM   Result Value Ref Range    Glucose (POC) 176 (H) 65 - 100 mg/dL    Performed by Yaniv Singh (PCT)    GLUCOSE, POC    Collection Time: 12/03/19  1:16 PM   Result Value Ref Range    Glucose (POC) 153 (H) 65 - 100 mg/dL    Performed by Yaniv [employer] (PCT)    ECHO ADULT COMPLETE    Collection Time: 12/03/19  1:30 PM   Result Value Ref Range    Right Atrial Area 4C 11.20 cm2    LV E' Lateral Velocity 11.09 cm/s    LV E' Septal Velocity 11.44 cm/s    Ao Root D 3.10 cm    Aortic Valve Systolic Peak Velocity 294.72 cm/s    Aortic Valve Area by Continuity of Peak Velocity 1.9 cm2    AoV PG 10.7 mmHg    LVIDd 4.14 (A) 4.2 - 5.9 cm    LVPWd 1.06 (A) 0.6 - 1.0 cm    LVIDs 3.06 cm    IVSd 2.20 (A) 0.6 - 1.0 cm    LVOT d 1.82 cm    LVOT Peak Velocity 117.99 cm/s    LVOT Peak Gradient 5.6 mmHg    LVOT VTI 26.62 cm    MV A Gilberto 144.99 cm/s    MV E Gilberto 132.92 cm/s    MV E/A 0.92     MV Mean Gradient 3.4 mmHg    Mitral Valve Annulus Velocity Time Integral 36.70 cm    Left Atrium to Aortic Root Ratio 0.83     LA Vol 4C 28.11 18 - 58 mL    LA Area 4C 13.6 cm2    LV Mass .8 (A) 88 - 224 g    LVPWs 1.08 cm    E/E' lateral 11.99     E/E' septal 11.62     RVSP 33.9 mmHg    MV Peak Gradient 7.1 mmHg    E/E' ratio (averaged) 11.80     Est. RA Pressure 10.0 mmHg    Mitral Valve E Wave Deceleration Time 207.7 ms    Left Atrium Major Axis 2.58 cm    Triscuspid Valve Regurgitation Peak Gradient 23.9 mmHg    Mitral Valve Max Velocity 133.41 cm/s    MVA VTI 1.9 cm2    LVOT SV 69.3 ml    TR Max Velocity 244.69 cm/s    PISA MR Rad 0.45 cm    PASP 33.9 mmHg    FRANCISCO/BSA Pk Gilberto 0.9 cm2/m2    Left Ventricular Fractional Shortening by 2D 71.460425342 %    Left Ventricular Outflow Tract Mean Gradient 7.7246796868913 mmHg    Left Ventricular Outflow Tract Mean Velocity 8.82113868631953 cm/s    Mitral Valve Deceleration Cowley 2.5993767081     Mitral valve mean inflow velocity 4.55342058868144 m/s    AV Velocity Ratio 0.72     Left Ventricular End Diastolic Volume by Teichholz Method 8.40926333348 mL    Left Ventricular End Systolic Volume by Teichholz Method 1.36795210983 mL    Left Ventricular Stroke Volume by Teichholz Method 07.529629873 mL       Imaging:  I have personally reviewed the patients radiographs and have reviewed the reports:  Progression of asdz from 11/29 to 12/2 but I think cxr today looks a little better than yesterday .          Maribell Cowart MD

## 2019-12-03 NOTE — PROGRESS NOTES
Bedside and Verbal shift change report given to Trenton RN(oncoming nurse) by Yossi RN (offgoing nurse). Report included the following information SBAR, Kardex, MAR and Recent Results.     Zone Phone:         Significant changes during shift:    Patient's lungs still course.   Discussed continuing respiratory excersise.      Patient Information     Miels Santos  46 y.o.  11/29/2019  1:32 PM by Sandi Restrepo MD. Miles Santos was admitted from home  Problem List          Patient Active Problem List     Diagnosis Date Noted    Bilateral pneumonia 11/29/2019    Severe sepsis ([address] 11/29/2019    GABY (acute kidney injury) ([address] 11/29/2019    Severe obesity ([address] 10/30/2018    Type 2 diabetes with nephropathy ([address] 03/30/2018    Vitamin D deficiency 02/27/2012    ED (erectile dysfunction) 01/09/2012    Type I diabetes mellitus, uncontrolled ([address] 09/08/2011    Essential hypertension, benign 09/08/2011    Hyperlipidemia LDL goal <70 09/08/2011    Unspecified cerebral artery occlusion with cerebral infarction 09/06/2011           Past Medical History:   Diagnosis Date    Diabetes ([address]      Other and unspecified hyperlipidemia      Stroke [facility])       TIA September 2011    TIA (transient ischemic attack) Sept 2011    Type I (juvenile type) diabetes mellitus without mention of complication, uncontrolled Age 8    Unspecified essential hypertension      Unspecified vitamin D deficiency 2/27/2012            Core Measures:     CVA: No Not applicable  CHF:No Not applicable  PNA:Yes yes        Activity Status:     OOB to Chair No  Ambulated this shift Yes   Bed Rest No     Supplemental O2: (If Applicable)     NC yes  NRB No  Venti-mask No  On 4 Liters/min        LINES AND DRAINS:     PIV     DVT prophylaxis:     DVT prophylaxis Med- Yes  DVT prophylaxis SCD or WALESKA- No      Wounds: (If Applicable)     Wounds- No     Location none     Patient Safety:     Falls Score Total Score: 1  Safety Level_______  Bed Alarm On? No  Sitter?  No     Plan for upcoming shift:   breathing treatments,   Respiratory Excersise  Q4 hour BG checks,   monitor I & O  Safety          Discharge Plan: No no note     Active Consults:  None

## 2019-12-03 NOTE — PROGRESS NOTES
Bedside and Verbal shift change report given to Elle RN(oncoming nurse) by Adore RN (offgoing nurse).  Report included the following information SBAR, Kardex, MAR and Recent Results.     Zone Phone:         Significant changes during shift:    Sputum and urine cultures collected results pending, Pulmonologist in to visit this shift.      Patient Information     Lindsay Estimable  46 y.o.  11/29/2019  1:32 PM by Jony Mejia MD. Colin Phillips was admitted from home  Problem List             Patient Active Problem List     Diagnosis Date Noted    Bilateral pneumonia 11/29/2019    Severe sepsis ([address] 11/29/2019    GABY (acute kidney injury) ([address] 11/29/2019    Severe obesity ([address] 10/30/2018    Type 2 diabetes with nephropathy ([address] 03/30/2018    Vitamin D deficiency 02/27/2012    ED (erectile dysfunction) 01/09/2012    Type I diabetes mellitus, uncontrolled ([address] 09/08/2011    Essential hypertension, benign 09/08/2011    Hyperlipidemia LDL goal <70 09/08/2011    Unspecified cerebral artery occlusion with cerebral infarction 09/06/2011              Past Medical History:   Diagnosis Date    Diabetes ([address]      Other and unspecified hyperlipidemia      Stroke [facility])       TIA September 2011    TIA (transient ischemic attack) Sept 2011    Type I (juvenile type) diabetes mellitus without mention of complication, uncontrolled Age 8    Unspecified essential hypertension      Unspecified vitamin D deficiency 2/27/2012            Core Measures:     CVA: No Not applicable  CHF:No Not applicable  PNA:Yes yes        Activity Status:     OOB to Chair No  Ambulated this shift Yes   Bed Rest No     Supplemental H8: (YK Applicable)     NC yes  NRB No  Venti-mask No  On 4 Liters/min        LINES AND DRAINS:     PIV     DVT prophylaxis:     DVT prophylaxis Med- Yes  DVT prophylaxis SCD or WALESKA- No      Wounds: (If Applicable)     Wounds- No     Location none     Patient Safety:     Falls Score Total Score: 1  Safety Level_______  Bed Alarm On? No  Sitter? No     Plan for upcoming shift:   breathing treatments,   Respiratory Excersise  Q4 hour BG checks,   monitor I & O  Safety          Discharge Plan: No TBD     Active Consults:  None

## 2019-12-03 NOTE — PROGRESS NOTES
Bedside and Verbal shift change report given to Yossi RN(oncoming nurse) by Saintclair Auerbach RN (offgoing nurse).  Report included the following information SBAR, Kardex, MAR and Recent Results.     Zone Phone:   [phone]        Significant changes during shift:  CBC was done and results were 32 DR Radha Saenz was aware, he stated probably steroids            Patient Information     Bibiana Stewart  46 y.o.  11/29/2019  1:32 PM by Steve Gordon MD. Bibiana Stewart was admitted from home  Problem List          Patient Active Problem List     Diagnosis Date Noted    Bilateral pneumonia 11/29/2019    Severe sepsis ([address] 11/29/2019    GABY (acute kidney injury) ([address] 11/29/2019    Severe obesity ([address] 10/30/2018    Type 2 diabetes with nephropathy ([address] 03/30/2018    Vitamin D deficiency 02/27/2012    ED (erectile dysfunction) 01/09/2012    Type I diabetes mellitus, uncontrolled ([address] 09/08/2011    Essential hypertension, benign 09/08/2011    Hyperlipidemia LDL goal <70 09/08/2011    Unspecified cerebral artery occlusion with cerebral infarction 09/06/2011           Past Medical History:   Diagnosis Date    Diabetes ([address]      Other and unspecified hyperlipidemia      Stroke [facility])       TIA September 2011    TIA (transient ischemic attack) Sept 2011    Type I (juvenile type) diabetes mellitus without mention of complication, uncontrolled Age 8    Unspecified essential hypertension      Unspecified vitamin D deficiency 2/27/2012            Core Measures:     CVA: No Not applicable  CHF:No Not applicable  PNA:Yes yes        Activity Status:     OOB to Chair No  Ambulated this shift Yes   Bed Rest No     Supplemental O2: (If Applicable)     NC yes  NRB No  Venti-mask No  On 4 Liters/min        LINES AND DRAINS:     PIV     DVT prophylaxis:     DVT prophylaxis Med- Yes  DVT prophylaxis SCD or WALESKA- No      Wounds: (If Applicable)     Wounds- No     Location none     Patient Safety:     Falls Score Total Score: 1  Safety Level_______  Bed Alarm On? No  Sitter?  No     Plan for upcoming shift: breathing treatments, Q4 hour BG checks, monitor I & O           Discharge Plan: No no note     Active Consults:  None

## 2019-12-03 NOTE — PROGRESS NOTES
Problem: Breathing Pattern - Ineffective  Goal: *Absence of hypoxia  Outcome: Progressing Towards Goal     Problem: Patient Education: Go to Patient Education Activity  Goal: Patient/Family Education  Outcome: Progressing Towards Goal

## 2019-12-03 NOTE — PROGRESS NOTES
Hospitalist Progress Note    NAME: Argentina Gomez   :  1970   MRN:  [id number]       Assessment / Plan:  Acute Hypoxic Respiratory Failure POA  -B/L PNA POA  -Leucocytosis, worsening  -Initially admitted with PNA, developed ? ARDS/Pulm edema  -Continue Abx  -Continues to be on 5 L NC  -was on steroids but was DCed, may need to restart  -Consult Pulmonary, ?ARDS  -continue Nebs  -Leucocytosis worsening as of yesterday, clinically better today, monitor for now, check CBC tomorrow  -wean O2 as tolerated  Check Sputum culture, urine strep and legionella, procacitonin  -Check Respi panel      Diabetes, Type II: uncontrolled in patient with hyperglycemia:   -Episodes of Hypoglycemia initially  -Lantus 60 Daily  Premeal insulin  SSI    GABY: resolved  -IVF's no discontinued, monitor renal function closely while diuresing    Obesity: BMI 36.73     Code Status: Full code  Surrogate Decision Maker: Wife Jeanie Fink 8141 [phone]     DVT Prophylaxis: SQ heparin  GI Prophylaxis: not indicated     Baseline: Patient is ambulatory at baseline     Subjective:     Chief Complaint / Reason for Physician Visit: follow-up pneumonia  Patient seen for follow-up    Continues to be on 5 l NC. Feels better. Denies CP, SOB      Review of Systems:  Symptom Y/N Comments  Symptom Y/N Comments   Fever/Chills    Chest Pain n    Poor Appetite    Edema n    Cough    Abdominal Pain n    Sputum    Joint Pain     SOB/PETERSEN y   Pruritis/Rash     Nausea/vomit    Tolerating PT/OT     Diarrhea    Tolerating Diet y    Constipation    Other       Could NOT obtain due to:      Objective:     VITALS:   Last 24hrs VS reviewed since prior progress note.  Most recent are:  Patient Vitals for the past 24 hrs:   Temp Pulse Resp BP SpO2   19 0737     94 %   19 0727 97.7 °F (36.5 °C) 80 20 164/80 93 %   19 0421 97.7 °F (36.5 °C) 87 20 138/73 97 %   19 0239     96 %   19 2334 98.1 °F (36.7 °C) 85 18 144/75 94 %   19 2113     93 %   12/02/19 1906 97.7 °F (36.5 °C) 84 20 148/72 93 %   12/02/19 1745  86  135/63    12/02/19 1558 97.8 °F (36.6 °C) 89 20 156/72 95 %   12/02/19 1354     93 %   12/02/19 1249 98 °F (36.7 °C) 89 18 149/82 94 %       Intake/Output Summary (Last 24 hours) at 12/3/2019 1239  Last data filed at 12/3/2019 0000  Gross per 24 hour   Intake 480 ml   Output 3425 ml   Net -2945 ml        PHYSICAL EXAM:  General: WD, WN. Alert, cooperative, no acute distress    EENT:  EOMI. Anicteric sclerae. MMM  Resp:  Coarse breath sounds with rales b/l  CV:  Regular  rhythm,  No edema  GI:  Soft, Non distended, Non tender.  +Bowel sounds  Neurologic:  Alert and oriented X 3, normal speech,   Psych:   Good insight. Not anxious nor agitated  Skin:  No rashes. No jaundice    Reviewed most current lab test results and cultures  YES  Reviewed most current radiology test results   YES  Review and summation of old records today    NO  Reviewed patient's current orders and MAR    YES  PMH/SH reviewed - no change compared to H&P  ________________________________________________________________________  Care Plan discussed with:    Comments   Patient x    Family      RN x    Care Manager x    Consultant  x Dr. Ritter Schools                    x Multidiciplinary team rounds were held today with , nursing, pharmacist and clinical coordinator. Patient's plan of care was discussed; medications were reviewed and discharge planning was addressed.      ________________________________________________________________________  Total NON critical care TIME:  25   Minutes    Total CRITICAL CARE TIME Spent:   Minutes non procedure based      Comments   >50% of visit spent in counseling and coordination of care     ________________________________________________________________________  Yas Degroot MD     Procedures: see electronic medical records for all procedures/Xrays and details which were not copied into this note but were reviewed prior to creation of Plan. LABS:  I reviewed today's most current labs and imaging studies.   Pertinent labs include:  Recent Labs     12/02/19  1648 12/01/19  0541   WBC 27.0* 17.6*   HGB 12.5 11.2*   HCT 35.9* 33.6*    274     Recent Labs     12/02/19  1319 12/01/19  0541   * 138   K 4.4 4.5    107   CO2 19* 21   * 185*   BUN 38* 35*   CREA 1.23 1.16   CA 7.8* 8.0*   MG  --  2.5*   PHOS  --  3.2   ALB  --  2.4*   TBILI  --  0.3   SGOT  --  40*   ALT  --  27       Signed: Sammuel Schwab, MD

## 2019-12-04 ENCOUNTER — APPOINTMENT (OUTPATIENT)
Dept: GENERAL RADIOLOGY | Age: 49
DRG: 871 | End: 2019-12-04
Attending: INTERNAL MEDICINE
Payer: COMMERCIAL

## 2019-12-04 LAB
ANION GAP SERPL CALC-SCNC: 5 MMOL/L (ref 5–15)
B PERT DNA SPEC QL NAA+PROBE: NOT DETECTED
BACTERIA SPEC CULT: NORMAL
BASOPHILS # BLD: 0 K/UL (ref 0–0.1)
BASOPHILS NFR BLD: 0 % (ref 0–1)
BUN SERPL-MCNC: 33 MG/DL (ref 6–20)
BUN/CREAT SERPL: 32 (ref 12–20)
C PNEUM DNA SPEC QL NAA+PROBE: NOT DETECTED
CALCIUM SERPL-MCNC: 8.1 MG/DL (ref 8.5–10.1)
CHLORIDE SERPL-SCNC: 102 MMOL/L (ref 97–108)
CO2 SERPL-SCNC: 30 MMOL/L (ref 21–32)
CREAT SERPL-MCNC: 1.04 MG/DL (ref 0.7–1.3)
DIFFERENTIAL METHOD BLD: ABNORMAL
EOSINOPHIL # BLD: 0 K/UL (ref 0–0.4)
EOSINOPHIL NFR BLD: 0 % (ref 0–7)
ERYTHROCYTE [DISTWIDTH] IN BLOOD BY AUTOMATED COUNT: 12.4 % (ref 11.5–14.5)
FLUAV H1 2009 PAND RNA SPEC QL NAA+PROBE: NOT DETECTED
FLUAV H1 RNA SPEC QL NAA+PROBE: NOT DETECTED
FLUAV H3 RNA SPEC QL NAA+PROBE: NOT DETECTED
FLUAV SUBTYP SPEC NAA+PROBE: NOT DETECTED
FLUBV RNA SPEC QL NAA+PROBE: NOT DETECTED
GLUCOSE BLD STRIP.AUTO-MCNC: 180 MG/DL (ref 65–100)
GLUCOSE BLD STRIP.AUTO-MCNC: 183 MG/DL (ref 65–100)
GLUCOSE BLD STRIP.AUTO-MCNC: 306 MG/DL (ref 65–100)
GLUCOSE BLD STRIP.AUTO-MCNC: 312 MG/DL (ref 65–100)
GLUCOSE BLD STRIP.AUTO-MCNC: 367 MG/DL (ref 65–100)
GLUCOSE SERPL-MCNC: 180 MG/DL (ref 65–100)
HADV DNA SPEC QL NAA+PROBE: NOT DETECTED
HCOV 229E RNA SPEC QL NAA+PROBE: NOT DETECTED
HCOV HKU1 RNA SPEC QL NAA+PROBE: NOT DETECTED
HCOV NL63 RNA SPEC QL NAA+PROBE: NOT DETECTED
HCOV OC43 RNA SPEC QL NAA+PROBE: NOT DETECTED
HCT VFR BLD AUTO: 37.5 % (ref 36.6–50.3)
HGB BLD-MCNC: 12.5 G/DL (ref 12.1–17)
HMPV RNA SPEC QL NAA+PROBE: NOT DETECTED
HPIV1 RNA SPEC QL NAA+PROBE: NOT DETECTED
HPIV2 RNA SPEC QL NAA+PROBE: NOT DETECTED
HPIV3 RNA SPEC QL NAA+PROBE: NOT DETECTED
HPIV4 RNA SPEC QL NAA+PROBE: NOT DETECTED
IMM GRANULOCYTES # BLD AUTO: 0 K/UL (ref 0–0.04)
IMM GRANULOCYTES NFR BLD AUTO: 0 % (ref 0–0.5)
LYMPHOCYTES # BLD: 1.2 K/UL (ref 0.8–3.5)
LYMPHOCYTES NFR BLD: 7 % (ref 12–49)
M PNEUMO DNA SPEC QL NAA+PROBE: NOT DETECTED
MAGNESIUM SERPL-MCNC: 2.5 MG/DL (ref 1.6–2.4)
MCH RBC QN AUTO: 31 PG (ref 26–34)
MCHC RBC AUTO-ENTMCNC: 33.3 G/DL (ref 30–36.5)
MCV RBC AUTO: 93.1 FL (ref 80–99)
MONOCYTES # BLD: 1.2 K/UL (ref 0–1)
MONOCYTES NFR BLD: 7 % (ref 5–13)
MYELOCYTES NFR BLD MANUAL: 1 %
NEUTS BAND NFR BLD MANUAL: 3 %
NEUTS SEG # BLD: 14.8 K/UL (ref 1.8–8)
NEUTS SEG NFR BLD: 82 % (ref 32–75)
NRBC # BLD: 0.02 K/UL (ref 0–0.01)
NRBC BLD-RTO: 0.1 PER 100 WBC
PLATELET # BLD AUTO: 399 K/UL (ref 150–400)
PMV BLD AUTO: 11.4 FL (ref 8.9–12.9)
POTASSIUM SERPL-SCNC: 4 MMOL/L (ref 3.5–5.1)
PROCALCITONIN SERPL-MCNC: 1.82 NG/ML
RBC # BLD AUTO: 4.03 M/UL (ref 4.1–5.7)
RBC MORPH BLD: ABNORMAL
RSV RNA SPEC QL NAA+PROBE: NOT DETECTED
RV+EV RNA SPEC QL NAA+PROBE: NOT DETECTED
SERVICE CMNT-IMP: ABNORMAL
SERVICE CMNT-IMP: NORMAL
SODIUM SERPL-SCNC: 137 MMOL/L (ref 136–145)
WBC # BLD AUTO: 17.4 K/UL (ref 4.1–11.1)

## 2019-12-04 PROCEDURE — 36415 COLL VENOUS BLD VENIPUNCTURE: CPT

## 2019-12-04 PROCEDURE — 74011250637 HC RX REV CODE- 250/637: Performed by: INTERNAL MEDICINE

## 2019-12-04 PROCEDURE — 85025 COMPLETE CBC W/AUTO DIFF WBC: CPT

## 2019-12-04 PROCEDURE — 74011636637 HC RX REV CODE- 636/637: Performed by: INTERNAL MEDICINE

## 2019-12-04 PROCEDURE — 74011250636 HC RX REV CODE- 250/636: Performed by: INTERNAL MEDICINE

## 2019-12-04 PROCEDURE — 74011000258 HC RX REV CODE- 258: Performed by: INTERNAL MEDICINE

## 2019-12-04 PROCEDURE — 71045 X-RAY EXAM CHEST 1 VIEW: CPT

## 2019-12-04 PROCEDURE — 94760 N-INVAS EAR/PLS OXIMETRY 1: CPT

## 2019-12-04 PROCEDURE — 74011000250 HC RX REV CODE- 250: Performed by: INTERNAL MEDICINE

## 2019-12-04 PROCEDURE — 77010033678 HC OXYGEN DAILY

## 2019-12-04 PROCEDURE — 94640 AIRWAY INHALATION TREATMENT: CPT

## 2019-12-04 PROCEDURE — 84145 PROCALCITONIN (PCT): CPT

## 2019-12-04 PROCEDURE — 74011250637 HC RX REV CODE- 250/637: Performed by: HOSPITALIST

## 2019-12-04 PROCEDURE — 83735 ASSAY OF MAGNESIUM: CPT

## 2019-12-04 PROCEDURE — 80048 BASIC METABOLIC PNL TOTAL CA: CPT

## 2019-12-04 PROCEDURE — 65660000000 HC RM CCU STEPDOWN

## 2019-12-04 PROCEDURE — 82962 GLUCOSE BLOOD TEST: CPT

## 2019-12-04 RX ORDER — AMLODIPINE BESYLATE 5 MG/1
5 TABLET ORAL DAILY
Status: DISCONTINUED | OUTPATIENT
Start: 2019-12-05 | End: 2019-12-08 | Stop reason: HOSPADM

## 2019-12-04 RX ORDER — LEVALBUTEROL INHALATION SOLUTION 1.25 MG/3ML
1.25 SOLUTION RESPIRATORY (INHALATION)
Status: DISCONTINUED | OUTPATIENT
Start: 2019-12-04 | End: 2019-12-06

## 2019-12-04 RX ORDER — INSULIN GLARGINE 100 [IU]/ML
70 INJECTION, SOLUTION SUBCUTANEOUS DAILY
Status: DISCONTINUED | OUTPATIENT
Start: 2019-12-05 | End: 2019-12-08

## 2019-12-04 RX ORDER — INSULIN LISPRO 100 [IU]/ML
10 INJECTION, SOLUTION INTRAVENOUS; SUBCUTANEOUS ONCE
Status: COMPLETED | OUTPATIENT
Start: 2019-12-04 | End: 2019-12-04

## 2019-12-04 RX ORDER — BUDESONIDE 0.5 MG/2ML
1000 INHALANT ORAL
Status: DISCONTINUED | OUTPATIENT
Start: 2019-12-04 | End: 2019-12-06

## 2019-12-04 RX ORDER — INSULIN LISPRO 100 [IU]/ML
INJECTION, SOLUTION INTRAVENOUS; SUBCUTANEOUS
Status: DISCONTINUED | OUTPATIENT
Start: 2019-12-04 | End: 2019-12-08 | Stop reason: HOSPADM

## 2019-12-04 RX ORDER — FUROSEMIDE 40 MG/1
40 TABLET ORAL 2 TIMES DAILY
Status: DISCONTINUED | OUTPATIENT
Start: 2019-12-04 | End: 2019-12-05

## 2019-12-04 RX ORDER — INSULIN LISPRO 100 [IU]/ML
INJECTION, SOLUTION INTRAVENOUS; SUBCUTANEOUS
Status: DISCONTINUED | OUTPATIENT
Start: 2019-12-04 | End: 2019-12-04 | Stop reason: SDUPTHER

## 2019-12-04 RX ADMIN — AZITHROMYCIN MONOHYDRATE 500 MG: 500 INJECTION, POWDER, LYOPHILIZED, FOR SOLUTION INTRAVENOUS at 16:34

## 2019-12-04 RX ADMIN — LEVALBUTEROL HYDROCHLORIDE 0.63 MG: 0.63 SOLUTION RESPIRATORY (INHALATION) at 02:09

## 2019-12-04 RX ADMIN — AMLODIPINE BESYLATE 2.5 MG: 2.5 TABLET ORAL at 08:05

## 2019-12-04 RX ADMIN — LEVALBUTEROL HYDROCHLORIDE 1.25 MG: 0.63 SOLUTION RESPIRATORY (INHALATION) at 13:54

## 2019-12-04 RX ADMIN — ASPIRIN 81 MG 81 MG: 81 TABLET ORAL at 08:05

## 2019-12-04 RX ADMIN — GUAIFENESIN 600 MG: 600 TABLET, EXTENDED RELEASE ORAL at 08:05

## 2019-12-04 RX ADMIN — FUROSEMIDE 40 MG: 10 INJECTION, SOLUTION INTRAMUSCULAR; INTRAVENOUS at 08:04

## 2019-12-04 RX ADMIN — GUAIFENESIN 600 MG: 600 TABLET, EXTENDED RELEASE ORAL at 22:22

## 2019-12-04 RX ADMIN — FUROSEMIDE 40 MG: 40 TABLET ORAL at 12:24

## 2019-12-04 RX ADMIN — BUDESONIDE 500 MCG: 0.5 INHALANT RESPIRATORY (INHALATION) at 08:22

## 2019-12-04 RX ADMIN — LEVALBUTEROL HYDROCHLORIDE 1.25 MG: 0.63 SOLUTION RESPIRATORY (INHALATION) at 08:21

## 2019-12-04 RX ADMIN — HEPARIN SODIUM 5000 UNITS: 5000 INJECTION INTRAVENOUS; SUBCUTANEOUS at 04:49

## 2019-12-04 RX ADMIN — INSULIN LISPRO 10 UNITS: 100 INJECTION, SOLUTION INTRAVENOUS; SUBCUTANEOUS at 22:21

## 2019-12-04 RX ADMIN — HEPARIN SODIUM 5000 UNITS: 5000 INJECTION INTRAVENOUS; SUBCUTANEOUS at 12:22

## 2019-12-04 RX ADMIN — CEFTRIAXONE SODIUM 2 G: 2 INJECTION, POWDER, FOR SOLUTION INTRAMUSCULAR; INTRAVENOUS at 15:08

## 2019-12-04 RX ADMIN — POTASSIUM CHLORIDE 10 MEQ: 750 TABLET, FILM COATED, EXTENDED RELEASE ORAL at 08:05

## 2019-12-04 RX ADMIN — Medication 10 ML: at 04:50

## 2019-12-04 RX ADMIN — FUROSEMIDE 40 MG: 40 TABLET ORAL at 17:31

## 2019-12-04 RX ADMIN — INSULIN LISPRO 3 UNITS: 100 INJECTION, SOLUTION INTRAVENOUS; SUBCUTANEOUS at 08:03

## 2019-12-04 RX ADMIN — POTASSIUM CHLORIDE 10 MEQ: 750 TABLET, FILM COATED, EXTENDED RELEASE ORAL at 17:31

## 2019-12-04 RX ADMIN — HEPARIN SODIUM 5000 UNITS: 5000 INJECTION INTRAVENOUS; SUBCUTANEOUS at 22:22

## 2019-12-04 RX ADMIN — DULOXETINE 30 MG: 30 CAPSULE, DELAYED RELEASE ORAL at 08:04

## 2019-12-04 RX ADMIN — INSULIN LISPRO 12 UNITS: 100 INJECTION, SOLUTION INTRAVENOUS; SUBCUTANEOUS at 08:03

## 2019-12-04 RX ADMIN — INSULIN LISPRO 12 UNITS: 100 INJECTION, SOLUTION INTRAVENOUS; SUBCUTANEOUS at 16:33

## 2019-12-04 RX ADMIN — METOPROLOL TARTRATE 12.5 MG: 25 TABLET ORAL at 17:31

## 2019-12-04 RX ADMIN — FAMOTIDINE 10 MG: 20 TABLET ORAL at 17:31

## 2019-12-04 RX ADMIN — PREDNISONE 30 MG: 20 TABLET ORAL at 08:04

## 2019-12-04 RX ADMIN — INSULIN LISPRO 12 UNITS: 100 INJECTION, SOLUTION INTRAVENOUS; SUBCUTANEOUS at 12:22

## 2019-12-04 RX ADMIN — LEVALBUTEROL HYDROCHLORIDE 1.25 MG: 1.25 SOLUTION RESPIRATORY (INHALATION) at 19:50

## 2019-12-04 RX ADMIN — FAMOTIDINE 10 MG: 20 TABLET ORAL at 08:05

## 2019-12-04 RX ADMIN — DULOXETINE 30 MG: 30 CAPSULE, DELAYED RELEASE ORAL at 17:31

## 2019-12-04 RX ADMIN — OXYMETAZOLINE HYDROCHLORIDE 2 SPRAY: 5 SPRAY NASAL at 04:53

## 2019-12-04 RX ADMIN — Medication 10 ML: at 22:23

## 2019-12-04 RX ADMIN — Medication 10 ML: at 14:00

## 2019-12-04 RX ADMIN — INSULIN GLARGINE 60 UNITS: 100 INJECTION, SOLUTION SUBCUTANEOUS at 08:03

## 2019-12-04 RX ADMIN — METOPROLOL TARTRATE 12.5 MG: 25 TABLET ORAL at 08:05

## 2019-12-04 RX ADMIN — BUDESONIDE 1000 MCG: 0.5 INHALANT RESPIRATORY (INHALATION) at 19:47

## 2019-12-04 RX ADMIN — Medication 1 CAPSULE: at 08:05

## 2019-12-04 RX ADMIN — PREDNISONE 30 MG: 20 TABLET ORAL at 16:36

## 2019-12-04 RX ADMIN — INSULIN LISPRO 10 UNITS: 100 INJECTION, SOLUTION INTRAVENOUS; SUBCUTANEOUS at 16:33

## 2019-12-04 RX ADMIN — INSULIN LISPRO 10 UNITS: 100 INJECTION, SOLUTION INTRAVENOUS; SUBCUTANEOUS at 12:23

## 2019-12-04 NOTE — PROGRESS NOTES
RORO Plan:    1) Home with family when medically stable - may need Home Health    2) [facility] not indicated at present time    3) CM will continue to follow medical management and assist with discharge when appropriate - will need PCP and possibly Pulmonary follow-up appts.     Eleuterio Wilcox, RN, BSN, [address] [place] [address]     [phone]

## 2019-12-04 NOTE — PROGRESS NOTES
Spiritual Care Partner Volunteer visited patient in Neuro on December 3, 2019.     Documented on December 4, 2019 by:     GLORIA Matos, [facility], Staff [address]    [address] Paging Service  [phone])

## 2019-12-04 NOTE — PROGRESS NOTES
ADULT PROTOCOL: JET AEROSOL  REASSESSMENT    Patient  David Postal     46 y.o.   male     12/4/2019  10:01 AM    Breath Sounds Pre Procedure: Right Breath Sounds: Wheezing                               Left Breath Sounds: Wheezing    Breath Sounds Post Procedure: Right Breath Sounds: Wheezing                                 Left Breath Sounds: Wheezing    Breathing pattern: Pre procedure Breathing Pattern: Regular          Post procedure Breathing Pattern: Regular    Heart Rate: Pre procedure Pulse: 90           Post procedure Pulse: 90    Resp Rate: Pre procedure Respirations: 16           Post procedure Respirations: 16            Cough: Pre procedure Cough: Productive               Post procedure Cough: Non-productive      Sputum: Pre procedure Sputum amount: Moderate  Sputum color/odor: Tan  Sputum consistency:  Thick                  Oxygen: O2 Device: Nasal cannula   Flow rate (L/min) 5 lpm     Changed: NO    SpO2: Pre procedure SpO2: 92 %   with oxygen              Post procedure SpO2: 90 %  with oxygen    Nebulizer Therapy: Current medications Aerosolized Medications: Pulmicort, Xopenex      Changed: NO        Problem List:   Patient Active Problem List   Diagnosis Code    Unspecified cerebral artery occlusion with cerebral infarction I63.50    Type I diabetes mellitus, uncontrolled ([facility] E10.65    Essential hypertension, benign I10    Hyperlipidemia LDL goal <70 E78.5    ED (erectile dysfunction) N52.9    Vitamin D deficiency E55.9    Type 2 diabetes with nephropathy (HCC) E11.21    Severe obesity (HCC) E66.01    Bilateral pneumonia J18.9    Severe sepsis (HCC) A41.9, R65.20    GABY (acute kidney injury) ([facility] N17.9       Respiratory Therapist: Peewee May RT

## 2019-12-04 NOTE — CONSULTS
[facility]  Pulmonary, Critical Care, and Sleep Medicine    Initial Patient Consult    Name: Lindsay Estimable MRN: [id number]   : 1970 Hospital: [facility]   Date: 2019        IMPRESSION:   · Pneumonia- ?cap or atypical or viral - no vaping . - has evolved suspect \"Leaky lungs\". - cxr today looks much better with decreased infiltrates . So far tests back are neg -- sputum culture is pd -  Few gram positive cocci. · Acute hypoxic resp failure - sat 91 to 93 % on 5 liter nc. -  This has been slow to recover. He looks ok but noted to have some freire walking in room. · Diabetic  · Suspected ady  · Mild uli  · Leukocytosis  · Bronchospasm-asthmatic bronchitis  · Mild to moderate mr with pasp 33   · Elevated probnp   · Wheezing - he denies hx of this -  Likely related to the acute resp  Infection       RECOMMENDATIONS:   · Agree with the comprehensive care provided by hospitalist team.   · o2   · No need for fob at this time- not suspected to be increased risk for opportunistic infection   · Rt / nebs   ·  continue close monitoring  · Agree with careful diuresis in view of suspected leaky lungs   · Agree with viral panel, urine ag - fu ag   · Will send hypersensitivity panel to cover bases - pd   · He says hiv status is neg but is not sure when it was checked. . By hx  Extremely low risk  So will hold on checking for now . As cxr is better not needed. · Agree with restarting  Steroid- may just try po pred and continue bls monitoring. · Continue current ab - set stop date      Subjective: This patient has been seen and evaluated at the request of Dr. Marjorie Mera  For acute hypoxic resp failure and possible ards. . Patient is a 46 y.o. male  Who resport a cough for about 2 week. Sputum was initially brown with no heme. He progressed on thanksgiving day with chills , increased sob and worsening fatigue. He presented  And was admitted on .  Cxr has progressed from 11/29 to 12/ 2 . He required increased o2 and  Is currently at 5 liter nc. He says he feels better. He has been comprehensively managed. So far bc ng . Sick contact was his wife who returned from a trip out of state where several family members were sick and then she got sick with a colde that evolved into bronchitis. ( She was on ab and pred and inhaler. )    Pt denies smoking,  Vaping. He admits to exposure to nitric acid, hydrochloric acid , acetate and natha in his occupation as an  but says the area is well ventilated and he has been around this for years. 12/3- says he  Feels fine. His sputum is now tan and a little more productive per his report . Past Medical History:   Diagnosis Date    Diabetes ([address]     Other and unspecified hyperlipidemia     Stroke [facility])     TIA September 2011    TIA (transient ischemic attack) Sept 2011    Type I (juvenile type) diabetes mellitus without mention of complication, uncontrolled Age 8    Unspecified essential hypertension     Unspecified vitamin D deficiency 2/27/2012      Past Surgical History:   Procedure Laterality Date    HX CATARACT REMOVAL Left 03/2019      Prior to Admission medications    Medication Sig Start Date End Date Taking? Authorizing Provider   DULoxetine (CYMBALTA) 30 mg capsule Take 1 Cap by mouth two (2) times a day.  11/22/19  Yes Donnal Kawasaki, MD   hydroCHLOROthiazide (HYDRODIURIL) 25 mg tablet TAKE 1 TABLET BY MOUTH EVERY DAY 11/7/19  Yes Donnal Kawasaki, MD   lisinopril (PRINIVIL, ZESTRIL) 40 mg tablet TAKE 1 TABLET DAILY 11/2/19  Yes Donnal Kawasaki, MD   insulin aspart U-100 (NOVOLOG FLEXPEN U-100 INSULIN) 100 unit/mL (3 mL) inpn FOR DIRECTIONS ON HOW TO   TAKE THIS MEDICINE, READ   THE ENCLOSED MEDICATION    INFORMATION FORM 11/2/19  Yes Donnal Kawasaki, MD   amLODIPine (NORVASC) 10 mg tablet TAKE 1 TABLET DAILY 11/2/19  Yes Donnal Kawasaki, MD   naproxen (NAPROSYN) 500 mg tablet Take 500 mg by mouth as needed. 10/30/19  Yes Provider, Historical   b complex vitamins (B COMPLEX 1) tablet Take 1 Tab by mouth daily. Yes Provider, Historical   TRESIBA FLEXTOUCH U-200 200 unit/mL (3 mL) inpn Inject 100 units in the morning--dispense 18 pens for 90 day supply--Dose change 11/1/19--updated med list--did not send prescription to the pharmacy 11/1/19  Yes Dali Casitllo MD   rosuvastatin (CRESTOR) 20 mg tablet Take 1/2 tab daily--Dose change 1/17/19--updated med list--did not send prescription to the pharmacy 1/17/19  Yes Dali Castillo MD   Insulin Needles, Disposable, (BD ULTRA-FINE MINI PEN NEEDLE) 31 gauge x 3/16\" ndle Use as directed 3 times daily 10/30/18  Yes Dail Castillo MD   insulin syringe-needle U-100 1 mL 31 gauge x 15/64\" syrg 1 Syringe by SubCUTAneous route five (5) times daily. 7/2/18  Yes Dali Castillo MD   aspirin 81 mg chewable tablet Take 81 mg by mouth daily. Yes Provider, Historical   ONETOUCH ULTRA TEST strip Test up to 6 times daily. Dx: 250.03 4/2/15  Yes Dali Castillo MD   [facility]. 33 gauge misc Use as directed up to 6 times daily. Dx: 250.03 4/2/15  Yes Dali Castillo MD   cholecalciferol, vitamin D3, (VITAMIN D3) 2,000 unit Tab Take  by mouth daily. Yes Provider, Historical   MV-MN/FA/D3/LYCOPENE/LUT/COQ10 (DAILY MULTIVITAMIN PO) Take  by mouth. Yes Provider, Historical   TRESIBA FLEXTOUCH U-200 200 unit/mL (3 mL) inpn INJECT 110  UNITS IN THE MORNING 11/2/19   Dali Castillo MD     No Known Allergies   Social History     Tobacco Use    Smoking status: Never Smoker    Smokeless tobacco: Never Used   Substance Use Topics    Alcohol use:  Yes     Alcohol/week: 1.0 standard drinks     Types: 1 Cans of beer per week     Comment: Rare beer   , wife was present, works as an  and works with ups [employer] to [employer] .   Family History   Problem Relation Age of Onset    Cancer Father         Bone cancer    Heart Attack Maternal Grandmother     Heart Attack Maternal Grandfather     Stroke Maternal Grandfather     Heart Attack Paternal Grandmother     Heart Attack Paternal Grandfather     Diabetes Other         2 nieces with Type 1 diabetes    Heart Disease Neg Hx     vaccines- did not get the flu shot this year. Has not had the pneumovax. Current Facility-Administered Medications   Medication Dose Route Frequency    insulin lispro (HUMALOG) injection   SubCUTAneous AC&HS    [START ON 12/5/2019] amLODIPine (NORVASC) tablet 5 mg  5 mg Oral DAILY    insulin glargine (LANTUS) injection 60 Units  60 Units SubCUTAneous DAILY    levalbuterol (XOPENEX) nebulizer soln 0.63 mg/3 mL  1.25 mg Nebulization Q6H RT    budesonide (PULMICORT) 500 mcg/2 ml nebulizer suspension  500 mcg Nebulization BID RT    predniSONE (DELTASONE) tablet 30 mg  30 mg Oral BID WITH MEALS    furosemide (LASIX) injection 40 mg  40 mg IntraVENous BID    famotidine (PEPCID) tablet 10 mg  10 mg Oral BID    potassium chloride SR (KLOR-CON 10) tablet 10 mEq  10 mEq Oral BID    insulin lispro (HUMALOG) injection 12 Units  12 Units SubCUTAneous TIDAC    metoprolol tartrate (LOPRESSOR) tablet 12.5 mg  12.5 mg Oral BID    cefTRIAXone (ROCEPHIN) 2 g in 0.9% sodium chloride (MBP/ADV) 50 mL  2 g IntraVENous Q24H    azithromycin (ZITHROMAX) 500 mg in 0.9% sodium chloride (MBP/ADV) 250 mL  500 mg IntraVENous Q24H    aspirin chewable tablet 81 mg  81 mg Oral DAILY    DULoxetine (CYMBALTA) capsule 30 mg  30 mg Oral BID    sodium chloride (NS) flush 5-40 mL  5-40 mL IntraVENous Q8H    heparin (porcine) injection 5,000 Units  5,000 Units SubCUTAneous Q8H    lactobac ac& pc-s.therm-b.anim (JASON Q/RISAQUAD)  1 Cap Oral DAILY    guaiFENesin ER (MUCINEX) tablet 600 mg  600 mg Oral Q12H       Review of Systems:  A comprehensive review of systems was negative except for that written in the HPI.     Objective:   Vital Signs:    Visit Vitals  /70   Pulse 88 Temp 97.8 °F (36.6 °C)   Resp 22   Wt 97.1 kg (214 lb)   SpO2 92%   BMI 36.73 kg/m²       O2 Device: Nasal cannula   O2 Flow Rate (L/min): 5 l/min   Temp (24hrs), Av.7 °F (36.5 °C), Min:97.4 °F (36.3 °C), Max:98.4 °F (36.9 °C)       Intake/Output:   Last shift:      No intake/output data recorded. Last 3 shifts:  1901 -  0700  In: 240 [P.O.:240]  Out: 3050 [Urine:3050]    Intake/Output Summary (Last 24 hours) at 2019 1126  Last data filed at 12/3/2019 1856  Gross per 24 hour   Intake    Output 1400 ml   Net -1400 ml      Physical Exam:   General:  Alert, cooperative, no distress, appears stated age. Stocky    Head:  Normocephalic, without obvious abnormality, atraumatic. Eyes:  Conjunctivae/corneas clear. PERRL, EOMs intact. Nose: Nares normal. Septum midline. Mucosa normal. No drainage or sinus tenderness. Throat: Lips, mucosa, and tongue normal. Teeth and gums normal. M4 airway    Neck: Supple, symmetrical, trachea midline, no adenopathy, broad   Back:   Symmetric, no curvature. ROM normal.   Lungs:     Decreased rales and has insp / exp bronchial bs and mild wheezing . Mild freire    Chest wall:  No tenderness or deformity. Heart:  Regular rate and rhythm, S1, S2 normal, no murmur, click, rub or gallop. softmurmur   Abdomen:   Soft, non-tender. Extremities: Extremities normal, atraumatic, no cyanosis .  Mild pedal edema    Pulses: Not examined    Skin: Skin color, texture, turgor normal. No rashes or lesions   Lymph nodes: Cervical, supraclavicular,  nodes normal.   Neurologic: Grossly nonfocal. Alert and oriented      Data review:     Recent Results (from the past 24 hour(s))   GLUCOSE, POC    Collection Time: 19  1:16 PM   Result Value Ref Range    Glucose (POC) 153 (H) 65 - 100 mg/dL    Performed by Percy Ruiz (PCT)    ECHO ADULT COMPLETE    Collection Time: 19  1:30 PM   Result Value Ref Range    Right Atrial Area 4C 11.20 cm2    LV E' Lateral Velocity 11.09 cm/s LV E' Septal Velocity 11.44 cm/s    Ao Root D 3.10 cm    Aortic Valve Systolic Peak Velocity 134.19 cm/s    Aortic Valve Area by Continuity of Peak Velocity 1.9 cm2    AoV PG 10.7 mmHg    LVIDd 4.14 (A) 4.2 - 5.9 cm    LVPWd 1.06 (A) 0.6 - 1.0 cm    LVIDs 3.06 cm    IVSd 2.20 (A) 0.6 - 1.0 cm    LVOT d 1.82 cm    LVOT Peak Velocity 117.99 cm/s    LVOT Peak Gradient 5.6 mmHg    LVOT VTI 26.62 cm    MV A Gilberto 144.99 cm/s    MV E Gilberto 132.92 cm/s    MV E/A 0.92     MV Mean Gradient 3.4 mmHg    Mitral Valve Annulus Velocity Time Integral 36.70 cm    Left Atrium to Aortic Root Ratio 0.83     LA Vol 4C 28.11 18 - 58 mL    LA Area 4C 13.6 cm2    LV Mass .8 (A) 88 - 224 g    LVPWs 1.08 cm    E/E' lateral 11.99     E/E' septal 11.62     RVSP 33.9 mmHg    MV Peak Gradient 7.1 mmHg    E/E' ratio (averaged) 11.80     Est. RA Pressure 10.0 mmHg    Mitral Valve E Wave Deceleration Time 207.7 ms    Left Atrium Major Axis 2.58 cm    Triscuspid Valve Regurgitation Peak Gradient 23.9 mmHg    Mitral Valve Max Velocity 133.41 cm/s    MVA VTI 1.9 cm2    LVOT SV 69.3 ml    TR Max Velocity 244.69 cm/s    PISA MR Rad 0.45 cm    PASP 33.9 mmHg    FRANCISCO/BSA Pk Gilberto 0.9 cm2/m2    Left Ventricular Fractional Shortening by 2D 75.120515778 %    Left Ventricular Outflow Tract Mean Gradient 2.0349459731126 mmHg    Left Ventricular Outflow Tract Mean Velocity 0.16264108493076 cm/s    Mitral Valve Deceleration [place] 8.1610137624     Mitral valve mean inflow velocity 5.91076654411996 m/s    AV Velocity Ratio 0.72     Left Ventricular End Diastolic Volume by Teichholz Method 5.40004359905 mL    Left Ventricular End Systolic Volume by Teichholz Method 2.94780856905 mL    Left Ventricular Stroke Volume by Teichholz Method 94.529446317 mL   RESPIRATORY PANEL,PCR,NASOPHARYNGEAL    Collection Time: 12/03/19  2:02 PM   Result Value Ref Range    Adenovirus NOT DETECTED NOTD      Coronavirus 229E NOT DETECTED NOTD      Coronavirus HKU1 NOT DETECTED NOTD Coronavirus CVNL63 NOT DETECTED NOTD      Coronavirus OC43 NOT DETECTED NOTD      Metapneumovirus NOT DETECTED NOTD      Rhinovirus and Enterovirus NOT DETECTED NOTD      Influenza A NOT DETECTED NOTD      Influenza A, subtype H1 NOT DETECTED NOTD      Influenza A, subtype H3 NOT DETECTED NOTD      INFLUENZA A H1N1 PCR NOT DETECTED NOTD      Influenza B NOT DETECTED NOTD      Parainfluenza 1 NOT DETECTED NOTD      Parainfluenza 2 NOT DETECTED NOTD      Parainfluenza 3 NOT DETECTED NOTD      Parainfluenza virus 4 NOT DETECTED NOTD      RSV by PCR NOT DETECTED NOTD      Bordetella pertussis - PCR NOT DETECTED NOTD      Chlamydophila pneumoniae DNA, QL, PCR NOT DETECTED NOTD      Mycoplasma pneumoniae DNA, QL, PCR NOT DETECTED NOTD     CULTURE, RESPIRATORY/SPUTUM/BRONCH W GRAM STAIN    Collection Time: 12/03/19  3:18 PM   Result Value Ref Range    Special Requests: NO SPECIAL REQUESTS      GRAM STAIN 2+ WBCS SEEN      GRAM STAIN RARE EPITHELIAL CELLS SEEN      GRAM STAIN FEW GRAM POSITIVE COCCI IN CLUSTERS      Culture result: PENDING    GLUCOSE, POC    Collection Time: 12/03/19  4:21 PM   Result Value Ref Range    Glucose (POC) 114 (H) 65 - 100 mg/dL    Performed by Akiko Garcia (PCT)    GLUCOSE, POC    Collection Time: 12/03/19  8:33 PM   Result Value Ref Range    Glucose (POC) 143 (H) 65 - 100 mg/dL    Performed by [address] [place] [address], POC    Collection Time: 12/04/19 12:27 AM   Result Value Ref Range    Glucose (POC) 180 (H) 65 - 100 mg/dL    Performed by [address] [place] [address], BASIC    Collection Time: 12/04/19  4:42 AM   Result Value Ref Range    Sodium 137 136 - 145 mmol/L    Potassium 4.0 3.5 - 5.1 mmol/L    Chloride 102 97 - 108 mmol/L    CO2 30 21 - 32 mmol/L    Anion gap 5 5 - 15 mmol/L    Glucose 180 (H) 65 - 100 mg/dL    BUN 33 (H) 6 - 20 MG/DL    Creatinine 1.04 0.70 - 1.30 MG/DL    BUN/Creatinine ratio 32 (H) 12 - 20      GFR est AA >60 >60 ml/min/1.73m2    GFR est non-AA >60 >60 ml/min/1.73m2    Calcium 8.1 (L) 8.5 - 10.1 MG/DL   CBC WITH AUTOMATED DIFF    Collection Time: 12/04/19  4:42 AM   Result Value Ref Range    WBC 17.4 (H) 4.1 - 11.1 K/uL    RBC 4.03 (L) 4.10 - 5.70 M/uL    HGB 12.5 12.1 - 17.0 g/dL    HCT 37.5 36.6 - 50.3 %    MCV 93.1 80.0 - 99.0 FL    MCH 31.0 26.0 - 34.0 PG    MCHC 33.3 30.0 - 36.5 g/dL    RDW 12.4 11.5 - 14.5 %    PLATELET 086 184 - 779 K/uL    MPV 11.4 8.9 - 12.9 FL    NRBC 0.1 (H) 0  WBC    ABSOLUTE NRBC 0.02 (H) 0.00 - 0.01 K/uL    NEUTROPHILS 82 (H) 32 - 75 %    BAND NEUTROPHILS 3 %    LYMPHOCYTES 7 (L) 12 - 49 %    MONOCYTES 7 5 - 13 %    EOSINOPHILS 0 0 - 7 %    BASOPHILS 0 0 - 1 %    MYELOCYTES 1 %    IMMATURE GRANULOCYTES 0 0.0 - 0.5 %    ABS. NEUTROPHILS 14.8 (H) 1.8 - 8.0 K/UL    ABS. LYMPHOCYTES 1.2 0.8 - 3.5 K/UL    ABS. MONOCYTES 1.2 (H) 0.0 - 1.0 K/UL    ABS. EOSINOPHILS 0.0 0.0 - 0.4 K/UL    ABS. BASOPHILS 0.0 0.0 - 0.1 K/UL    ABS. IMM. GRANS. 0.0 0.00 - 0.04 K/UL    DF AUTOMATED      RBC COMMENTS NORMOCYTIC, NORMOCHROMIC     PROCALCITONIN    Collection Time: 12/04/19  4:42 AM   Result Value Ref Range    Procalcitonin 1.82 ng/mL   MAGNESIUM    Collection Time: 12/04/19  4:42 AM   Result Value Ref Range    Magnesium 2.5 (H) 1.6 - 2.4 mg/dL   GLUCOSE, POC    Collection Time: 12/04/19  6:48 AM   Result Value Ref Range    Glucose (POC) 183 (H) 65 - 100 mg/dL    Performed by Evelin Albert (PCT)        Imaging:  I have personally reviewed the patients radiographs and have reviewed the reports:  Progression of asdz from 11/29 to 12/2 but I think cxr today looks a little better than yesterday . 12/4- cxr is better with decreased infiltrates.       Kadi Obrien MD

## 2019-12-04 NOTE — PROGRESS NOTES
Problem: Pneumonia: Discharge Outcomes  Goal: *Demonstrates progressive activity  Outcome: Progressing Towards Goal  Goal: *Describes follow-up/return visits to physicians  Outcome: Progressing Towards Goal  Goal: *Tolerating diet  Outcome: Progressing Towards Goal  Goal: *Verbalizes name, dosage, time, side effects, and number of days to continue medications  Outcome: Progressing Towards Goal  Goal: *Influenza immunization  Outcome: Progressing Towards Goal  Goal: *Pneumococcal immunization  Outcome: Progressing Towards Goal  Goal: *Respiratory status at baseline  Outcome: Progressing Towards Goal  Goal: *Vital signs within defined limits  Outcome: Progressing Towards Goal  Goal: *Describes available resources and support systems  Outcome: Progressing Towards Goal  Goal: *Optimal pain control at patient's stated goal  Outcome: Progressing Towards Goal

## 2019-12-04 NOTE — PROGRESS NOTES
Progress Note      12/4/2019 8:11 AM  NAME: Scooby Melvin   MRN:  [id number]   Admit Diagnosis: Bilateral pneumonia [J18.9]  Severe sepsis ([facility] [A41.9, R65.20]  GABY (acute kidney injury) ([facility] [N17.9]                Assessment:       Cough, chills, pneumonia, leukocytosis, GABY  Acute diastolic chf vs. ARDS.    - No hx of CAD  - Echo 2011 EF 60%, Echo 12/2019 EF 55%, mild to mod MR  - sinus  - Hypertensive heart disease with CKD  - Insulin DM Dr. Clarice Bethea  - Dyslipidemia  - CVA in 2011  - Obese/Snores, recommended sleep study  , 2 kids, works for [employer] and as an [employer], physical job                     Plan:        Initial presentation consistent with pneumonia with fever, chills, leukocytosis. GABY, HCTZ held and given diuretic, hydration, but then possible acute diastolic chf.    Echo normal EF, mild to mod MR  Outpt sleep study  Check repeat CXR     1. Cont ASA  2. Cont added metoprolol 12.5mg bid  3. Increase amlodipine 5mg daily, was on 10mg as outpt  4. Holding lisinopril, resume prior to discharge  5. Stop HCTZ, cont with lasix 40mg iv bid, follow bmp  6. Cont crestor     Appreciate pulmonary evaluation, on steroids as well. Improving, but still on 5L O2.          [x]? High complexity decision making was performed            Subjective:     Scooby Melvin denies chest pain, +dyspnea. Discussed with RN events overnight. Review of Systems:    Symptom Y/N Comments  Symptom Y/N Comments   Fever/Chills N   Chest Pain N    Poor Appetite N   Edema N    Cough N   Abdominal Pain N    Sputum N   Joint Pain N    SOB/PETERSEN Y   Pruritis/Rash N    Nausea/vomit N   Tolerating PT/OT Y    Diarrhea N   Tolerating Diet Y    Constipation N   Other       Could NOT obtain due to:      Objective:      Physical Exam:    Last 24hrs VS reviewed since prior progress note.  Most recent are:    Visit Vitals  /70   Pulse 88   Temp 97.8 °F (36.6 °C)   Resp 22   Wt 97.1 kg (214 lb)   SpO2 92%   BMI 36.73 kg/m² Intake/Output Summary (Last 24 hours) at 12/4/2019 0912  Last data filed at 12/3/2019 1856  Gross per 24 hour   Intake    Output 1400 ml   Net -1400 ml        General Appearance: Well developed, well nourished, alert & oriented x 3,    no acute distress. Ears/Nose/Mouth/Throat: Hearing grossly normal.  Neck: Supple. Chest: Lungs diffuse wheeze to auscultation bilaterally. Cardiovascular: Regular rate and rhythm, S1S2 normal, no murmur. Abdomen: Soft, non-tender, bowel sounds are active. Extremities: No edema bilaterally. Skin: Warm and dry. PMH/SH reviewed - no change compared to H&P    Data Review    Telemetry: normal sinus rhythm     Lab Data Personally Reviewed:    Recent Labs     12/04/19  0442 12/02/19  1648   WBC 17.4* 27.0*   HGB 12.5 12.5   HCT 37.5 35.9*    376     No results for input(s): INR, PTP, APTT, INREXT, INREXT in the last 72 hours. Recent Labs     12/04/19  0442 12/02/19  1319    133*   K 4.0 4.4    106   CO2 30 19*   BUN 33* 38*   CREA 1.04 1.23   * 178*   CA 8.1* 7.8*   MG 2.5*  --      No results for input(s): CPK, CKNDX, TROIQ in the last 72 hours. No lab exists for component: CPKMB  Lab Results   Component Value Date/Time    Cholesterol, total 190 10/22/2019 09:01 AM    HDL Cholesterol 37 (L) 10/22/2019 09:01 AM    LDL,Direct 171 (H) 09/07/2011 03:46 AM    LDL-CHOLESTEROL 123 (H) 10/22/2019 09:01 AM    LDL, calculated 112 (H) 09/11/2017 08:55 AM    Triglyceride 186 (H) 10/22/2019 09:01 AM    CHOL/HDL Ratio 8.0 (H) 09/08/2011 04:21 AM    Cholesterol/HDL ratio 5.1 (H) 10/22/2019 09:01 AM       No results for input(s): SGOT, GPT, AP, TBIL, TP, ALB, GLOB, GGT, AML, LPSE in the last 72 hours. No lab exists for component: AMYP, HLPSE  No results for input(s): PH, PCO2, PO2 in the last 72 hours.     Medications Personally Reviewed:    Current Facility-Administered Medications   Medication Dose Route Frequency    insulin lispro (HUMALOG) injection SubCUTAneous AC&HS    insulin glargine (LANTUS) injection 60 Units  60 Units SubCUTAneous DAILY    levalbuterol (XOPENEX) nebulizer soln 0.63 mg/3 mL  1.25 mg Nebulization Q6H RT    budesonide (PULMICORT) 500 mcg/2 ml nebulizer suspension  500 mcg Nebulization BID RT    predniSONE (DELTASONE) tablet 30 mg  30 mg Oral BID WITH MEALS    oxymetazoline (AFRIN) 0.05 % nasal spray 2 Spray  2 Spray Both Nostrils BID PRN    sodium chloride (AYR SALINE) 0.65 % nasal drops 2 Drop  2 Drop Both Nostrils Q2H PRN    furosemide (LASIX) injection 40 mg  40 mg IntraVENous BID    famotidine (PEPCID) tablet 10 mg  10 mg Oral BID    potassium chloride SR (KLOR-CON 10) tablet 10 mEq  10 mEq Oral BID    amLODIPine (NORVASC) tablet 2.5 mg  2.5 mg Oral DAILY    insulin lispro (HUMALOG) injection 12 Units  12 Units SubCUTAneous TIDAC    glucose chewable tablet 16 g  4 Tab Oral PRN    dextrose (D50W) injection syrg 12.5-25 g  12.5-25 g IntraVENous PRN    zolpidem (AMBIEN) tablet 5 mg  5 mg Oral QHS PRN    metoprolol tartrate (LOPRESSOR) tablet 12.5 mg  12.5 mg Oral BID    cefTRIAXone (ROCEPHIN) 2 g in 0.9% sodium chloride (MBP/ADV) 50 mL  2 g IntraVENous Q24H    azithromycin (ZITHROMAX) 500 mg in 0.9% sodium chloride (MBP/ADV) 250 mL  500 mg IntraVENous Q24H    acetaminophen (TYLENOL) tablet 650 mg  650 mg Oral Q6H PRN    ondansetron (ZOFRAN) injection 4 mg  4 mg IntraVENous Q4H PRN    aspirin chewable tablet 81 mg  81 mg Oral DAILY    DULoxetine (CYMBALTA) capsule 30 mg  30 mg Oral BID    dextrose (D50) infusion 12.5-25 g  12.5-25 g IntraVENous PRN    glucagon (GLUCAGEN) injection 1 mg  1 mg IntraMUSCular PRN    sodium chloride (NS) flush 5-40 mL  5-40 mL IntraVENous Q8H    sodium chloride (NS) flush 5-40 mL  5-40 mL IntraVENous PRN    heparin (porcine) injection 5,000 Units  5,000 Units SubCUTAneous Q8H    lactobac ac& pc-s.therm-b.anim (JASON Q/RISAQUAD)  1 Cap Oral DAILY    guaiFENesin ER (MUCINEX) tablet 600 mg  600 mg Oral Q12H    guaiFENesin-dextromethorphan (ROBITUSSIN DM) 100-10 mg/5 mL syrup 10 mL  10 mL Oral Q6H PRN         Elba Headley MD

## 2019-12-04 NOTE — PROGRESS NOTES
ADULT PROTOCOL: JET AEROSOL  REASSESSMENT    Patient  Bibiana Stewart     46 y.o.   male     12/3/2019  8:00 PM    Breath Sounds Pre Procedure: Right Breath Sounds: Expiratory wheezing                               Left Breath Sounds: Expiratory wheezing    Breath Sounds Post Procedure: Right Breath Sounds: Coarse, Expiratory wheezing                                 Left Breath Sounds: Coarse, Expiratory wheezing    Breathing pattern: Pre procedure Breathing Pattern: Regular          Post procedure Breathing Pattern: Regular    Heart Rate: Pre procedure Pulse: 89           Post procedure Pulse: 92    Resp Rate: Pre procedure Respirations: 20           Post procedure Respirations: 20      Cough: Pre procedure Cough: Non-productive               Post procedure Cough: Non-productive      Oxygen: O2 Device: Nasal cannula   5 LPM     Changed: NO    SpO2: Pre procedure SpO2: 92 %   WITH oxygen              Post procedure SpO2: 91 %  WITH oxygen    Nebulizer Therapy: Current medications Aerosolized Medications: DuoNeb, Xopenex      Changed: NO    Problem List:   Patient Active Problem List   Diagnosis Code    Unspecified cerebral artery occlusion with cerebral infarction I63.50    Type I diabetes mellitus, uncontrolled ([facility] E10.65    Essential hypertension, benign I10    Hyperlipidemia LDL goal <70 E78.5    ED (erectile dysfunction) N52.9    Vitamin D deficiency E55.9    Type 2 diabetes with nephropathy (HCC) E11.21    Severe obesity (HCC) E66.01    Bilateral pneumonia J18.9    Severe sepsis (HCC) A41.9, R65.20    [facility] N17.9       Respiratory Therapist: Maya Dye

## 2019-12-04 NOTE — PROGRESS NOTES
Bedside and Verbal shift change report given Hafsa Akhtar(oncoming nurse) by Krissy TANG (offgoing nurse).  Report included the following information SBAR, Kardex, MAR and Recent Results.     Zone Phone:         Significant changes during shift:    None    Patient Information     Socorro Argueta  46 y.o.  11/29/2019  1:32 PM by Holli Craig MD. Colin Phillips was admitted from home  Problem List             Patient Active Problem List     Diagnosis Date Noted    Bilateral pneumonia 11/29/2019    Severe sepsis ([address] 11/29/2019    GABY (acute kidney injury) ([address] 11/29/2019    Severe obesity ([address] 10/30/2018    Type 2 diabetes with nephropathy ([address] 03/30/2018    Vitamin D deficiency 02/27/2012    ED (erectile dysfunction) 01/09/2012    Type I diabetes mellitus, uncontrolled ([address] 09/08/2011    Essential hypertension, benign 09/08/2011    Hyperlipidemia LDL goal <70 09/08/2011    Unspecified cerebral artery occlusion with cerebral infarction 09/06/2011              Past Medical History:   Diagnosis Date    Diabetes ([address]      Other and unspecified hyperlipidemia      Stroke [facility])       TIA September 2011    TIA (transient ischemic attack) Sept 2011    Type I (juvenile type) diabetes mellitus without mention of complication, uncontrolled Age 8    Unspecified essential hypertension      Unspecified vitamin D deficiency 2/27/2012            Core Measures:     CVA: No Not applicable  CHF:No Not applicable  PNA:Yes yes        Activity Status:     OOB to Chair No  Ambulated this shift Yes   Bed Rest No     Supplemental J1: (RF Applicable)     NC yes  NRB No  Venti-mask No  On 4 Liters/min        LINES AND DRAINS:     PIV     DVT prophylaxis:     DVT prophylaxis Med- Yes  DVT prophylaxis SCD or WALESKA- No      Wounds: (If Applicable)     Wounds- No     Location none     Patient Safety:     Falls Score Total Score: 1  Safety Level_______  Bed Alarm On? No  Sitter? No     Plan for upcoming shift:   breathing treatments,   Respiratory Excersise  Q4 hour BG checks,   monitor I & O  Safety    Needs repeat Chest xray     Discharge Plan: Ongoing     Active Consults:  None

## 2019-12-04 NOTE — PROGRESS NOTES
Problem: Falls - Risk of  Goal: *Absence of Falls  Description  Document Flaco Mullen Fall Risk and appropriate interventions in the flowsheet.   Outcome: Progressing Towards Goal  Note: Fall Risk Interventions:  Mobility Interventions: Patient to call before getting OOB         Medication Interventions: Patient to call before getting OOB, Teach patient to arise slowly

## 2019-12-04 NOTE — PROGRESS NOTES
Bedside and Verbal shift change report given Wellington Weems RN(oncoming nurse) by Adore TANG (offgoing nurse).  Report included the following information SBAR, Kardex, MAR and Recent Results.     Zone Phone:    [phone]     Significant changes during shift:  Chest x-ray completed,     Patient Information     Caridad Rota  46 y.o.  11/29/2019  1:32 PM by Yamileth Chery MD. Colin Phillips was admitted from home  Problem List             Patient Active Problem List     Diagnosis Date Noted    Bilateral pneumonia 11/29/2019    Severe sepsis ([address] 11/29/2019    GABY (acute kidney injury) ([address] 11/29/2019    Severe obesity ([address] 10/30/2018    Type 2 diabetes with nephropathy ([address] 03/30/2018    Vitamin D deficiency 02/27/2012    ED (erectile dysfunction) 01/09/2012    Type I diabetes mellitus, uncontrolled ([address] 09/08/2011    Essential hypertension, benign 09/08/2011    Hyperlipidemia LDL goal <70 09/08/2011    Unspecified cerebral artery occlusion with cerebral infarction 09/06/2011              Past Medical History:   Diagnosis Date    Diabetes ([address]      Other and unspecified hyperlipidemia      Stroke [facility])       TIA September 2011    TIA (transient ischemic attack) Sept 2011    Type I (juvenile type) diabetes mellitus without mention of complication, uncontrolled Age 8    Unspecified essential hypertension      Unspecified vitamin D deficiency 2/27/2012            Core Measures:     CVA: No Not applicable  CHF:No Not applicable  PNA:Yes yes        Activity Status:     OOB to Chair No  Ambulated this shift Yes   Bed Rest No     Supplemental O6: (WO Applicable)     NC yes  NRB No  Venti-mask No  On 4 Liters/min        LINES AND DRAINS:     PIV     DVT prophylaxis:     DVT prophylaxis Med- Yes  DVT prophylaxis SCD or WALESKA- No      Wounds: (If Applicable)     Wounds- No     Location none     Patient Safety:     Falls Score Total Score: 1  Safety Level_______  Bed Alarm On? No  Sitter? No     Plan for upcoming shift:   breathing treatments,   Respiratory Excersise  Q4 hour BG checks,   monitor I & O  Safety    Needs repeat Chest xray     Discharge Plan: Ongoing     Active Consults:  None

## 2019-12-05 LAB
ANION GAP SERPL CALC-SCNC: 5 MMOL/L (ref 5–15)
BACTERIA SPEC CULT: ABNORMAL
BASOPHILS # BLD: 0 K/UL (ref 0–0.1)
BASOPHILS NFR BLD: 0 % (ref 0–1)
BUN SERPL-MCNC: 35 MG/DL (ref 6–20)
BUN/CREAT SERPL: 29 (ref 12–20)
CALCIUM SERPL-MCNC: 8.8 MG/DL (ref 8.5–10.1)
CHLORIDE SERPL-SCNC: 97 MMOL/L (ref 97–108)
CO2 SERPL-SCNC: 31 MMOL/L (ref 21–32)
CREAT SERPL-MCNC: 1.22 MG/DL (ref 0.7–1.3)
DIFFERENTIAL METHOD BLD: ABNORMAL
EOSINOPHIL # BLD: 0 K/UL (ref 0–0.4)
EOSINOPHIL NFR BLD: 0 % (ref 0–7)
ERYTHROCYTE [DISTWIDTH] IN BLOOD BY AUTOMATED COUNT: 12.4 % (ref 11.5–14.5)
FLUID CULTURE, SPNG2: ABNORMAL
GLUCOSE BLD STRIP.AUTO-MCNC: 296 MG/DL (ref 65–100)
GLUCOSE BLD STRIP.AUTO-MCNC: 389 MG/DL (ref 65–100)
GLUCOSE BLD STRIP.AUTO-MCNC: 395 MG/DL (ref 65–100)
GLUCOSE BLD STRIP.AUTO-MCNC: 404 MG/DL (ref 65–100)
GLUCOSE SERPL-MCNC: 300 MG/DL (ref 65–100)
GRAM STN SPEC: ABNORMAL
HCT VFR BLD AUTO: 39.6 % (ref 36.6–50.3)
HGB BLD-MCNC: 13.2 G/DL (ref 12.1–17)
IMM GRANULOCYTES # BLD AUTO: 0 K/UL (ref 0–0.04)
IMM GRANULOCYTES NFR BLD AUTO: 0 % (ref 0–0.5)
L PNEUMO1 AG UR QL IA: NEGATIVE
LYMPHOCYTES # BLD: 0.8 K/UL (ref 0.8–3.5)
LYMPHOCYTES NFR BLD: 5 % (ref 12–49)
MCH RBC QN AUTO: 30.8 PG (ref 26–34)
MCHC RBC AUTO-ENTMCNC: 33.3 G/DL (ref 30–36.5)
MCV RBC AUTO: 92.3 FL (ref 80–99)
METAMYELOCYTES NFR BLD MANUAL: 1 %
MONOCYTES # BLD: 0.3 K/UL (ref 0–1)
MONOCYTES NFR BLD: 2 % (ref 5–13)
MYELOCYTES NFR BLD MANUAL: 2 %
NEUTS BAND NFR BLD MANUAL: 7 %
NEUTS SEG # BLD: 14.4 K/UL (ref 1.8–8)
NEUTS SEG NFR BLD: 82 % (ref 32–75)
NRBC # BLD: 0 K/UL (ref 0–0.01)
NRBC BLD-RTO: 0 PER 100 WBC
ORGANISM ID, SPNG3: ABNORMAL
PLATELET # BLD AUTO: 397 K/UL (ref 150–400)
PLEASE NOTE, SPNG4: ABNORMAL
PMV BLD AUTO: 11.6 FL (ref 8.9–12.9)
POTASSIUM SERPL-SCNC: 4.4 MMOL/L (ref 3.5–5.1)
PROMYELOCYTES NFR BLD MANUAL: 1 %
RBC # BLD AUTO: 4.29 M/UL (ref 4.1–5.7)
RBC MORPH BLD: ABNORMAL
S PNEUM AG SPEC QL LA: POSITIVE
SERVICE CMNT-IMP: ABNORMAL
SODIUM SERPL-SCNC: 133 MMOL/L (ref 136–145)
SPECIMEN SOURCE: ABNORMAL
SPECIMEN SOURCE: NORMAL
SPECIMEN, SPNG1: ABNORMAL
WBC # BLD AUTO: 16.2 K/UL (ref 4.1–11.1)

## 2019-12-05 PROCEDURE — 74011636637 HC RX REV CODE- 636/637: Performed by: INTERNAL MEDICINE

## 2019-12-05 PROCEDURE — 94640 AIRWAY INHALATION TREATMENT: CPT

## 2019-12-05 PROCEDURE — 94760 N-INVAS EAR/PLS OXIMETRY 1: CPT

## 2019-12-05 PROCEDURE — 74011250637 HC RX REV CODE- 250/637: Performed by: INTERNAL MEDICINE

## 2019-12-05 PROCEDURE — 74011250636 HC RX REV CODE- 250/636: Performed by: INTERNAL MEDICINE

## 2019-12-05 PROCEDURE — 36415 COLL VENOUS BLD VENIPUNCTURE: CPT

## 2019-12-05 PROCEDURE — 90670 PCV13 VACCINE IM: CPT | Performed by: INTERNAL MEDICINE

## 2019-12-05 PROCEDURE — 74011000250 HC RX REV CODE- 250: Performed by: INTERNAL MEDICINE

## 2019-12-05 PROCEDURE — 90471 IMMUNIZATION ADMIN: CPT

## 2019-12-05 PROCEDURE — 80048 BASIC METABOLIC PNL TOTAL CA: CPT

## 2019-12-05 PROCEDURE — 85025 COMPLETE CBC W/AUTO DIFF WBC: CPT

## 2019-12-05 PROCEDURE — 77010033678 HC OXYGEN DAILY

## 2019-12-05 PROCEDURE — 82962 GLUCOSE BLOOD TEST: CPT

## 2019-12-05 PROCEDURE — 74011000258 HC RX REV CODE- 258: Performed by: INTERNAL MEDICINE

## 2019-12-05 PROCEDURE — 74011636637 HC RX REV CODE- 636/637: Performed by: HOSPITALIST

## 2019-12-05 PROCEDURE — 65660000000 HC RM CCU STEPDOWN

## 2019-12-05 RX ORDER — NYSTATIN 100000 [USP'U]/ML
500000 SUSPENSION ORAL 4 TIMES DAILY
Status: DISCONTINUED | OUTPATIENT
Start: 2019-12-05 | End: 2019-12-08 | Stop reason: HOSPADM

## 2019-12-05 RX ORDER — METOPROLOL SUCCINATE 25 MG/1
12.5 TABLET, EXTENDED RELEASE ORAL ONCE
Status: COMPLETED | OUTPATIENT
Start: 2019-12-05 | End: 2019-12-05

## 2019-12-05 RX ORDER — INSULIN GLARGINE 100 [IU]/ML
10 INJECTION, SOLUTION SUBCUTANEOUS ONCE
Status: COMPLETED | OUTPATIENT
Start: 2019-12-05 | End: 2019-12-05

## 2019-12-05 RX ORDER — INSULIN LISPRO 100 [IU]/ML
8 INJECTION, SOLUTION INTRAVENOUS; SUBCUTANEOUS ONCE
Status: COMPLETED | OUTPATIENT
Start: 2019-12-05 | End: 2019-12-05

## 2019-12-05 RX ORDER — METOPROLOL SUCCINATE 25 MG/1
25 TABLET, EXTENDED RELEASE ORAL DAILY
Status: DISCONTINUED | OUTPATIENT
Start: 2019-12-06 | End: 2019-12-08 | Stop reason: HOSPADM

## 2019-12-05 RX ORDER — FUROSEMIDE 40 MG/1
40 TABLET ORAL DAILY
Status: DISCONTINUED | OUTPATIENT
Start: 2019-12-06 | End: 2019-12-06

## 2019-12-05 RX ADMIN — DULOXETINE 30 MG: 30 CAPSULE, DELAYED RELEASE ORAL at 18:00

## 2019-12-05 RX ADMIN — PNEUMOCOCCAL 13-VALENT CONJUGATE VACCINE 0.5 ML: 2.2; 2.2; 2.2; 2.2; 2.2; 4.4; 2.2; 2.2; 2.2; 2.2; 2.2; 2.2; 2.2 INJECTION, SUSPENSION INTRAMUSCULAR at 15:01

## 2019-12-05 RX ADMIN — BUDESONIDE 1000 MCG: 0.5 INHALANT RESPIRATORY (INHALATION) at 19:17

## 2019-12-05 RX ADMIN — DULOXETINE 30 MG: 30 CAPSULE, DELAYED RELEASE ORAL at 08:09

## 2019-12-05 RX ADMIN — POTASSIUM CHLORIDE 10 MEQ: 750 TABLET, FILM COATED, EXTENDED RELEASE ORAL at 17:12

## 2019-12-05 RX ADMIN — PREDNISONE 30 MG: 20 TABLET ORAL at 08:08

## 2019-12-05 RX ADMIN — FUROSEMIDE 40 MG: 40 TABLET ORAL at 08:09

## 2019-12-05 RX ADMIN — NYSTATIN 500000 UNITS: 100000 SUSPENSION ORAL at 21:58

## 2019-12-05 RX ADMIN — GUAIFENESIN 600 MG: 600 TABLET, EXTENDED RELEASE ORAL at 08:09

## 2019-12-05 RX ADMIN — Medication 10 ML: at 23:04

## 2019-12-05 RX ADMIN — INSULIN GLARGINE 70 UNITS: 100 INJECTION, SOLUTION SUBCUTANEOUS at 08:09

## 2019-12-05 RX ADMIN — LEVALBUTEROL HYDROCHLORIDE 1.25 MG: 1.25 SOLUTION RESPIRATORY (INHALATION) at 13:03

## 2019-12-05 RX ADMIN — INSULIN LISPRO 12 UNITS: 100 INJECTION, SOLUTION INTRAVENOUS; SUBCUTANEOUS at 11:34

## 2019-12-05 RX ADMIN — Medication 1 CAPSULE: at 08:08

## 2019-12-05 RX ADMIN — INSULIN LISPRO 10 UNITS: 100 INJECTION, SOLUTION INTRAVENOUS; SUBCUTANEOUS at 11:34

## 2019-12-05 RX ADMIN — GUAIFENESIN 600 MG: 600 TABLET, EXTENDED RELEASE ORAL at 21:58

## 2019-12-05 RX ADMIN — METOPROLOL TARTRATE 12.5 MG: 25 TABLET ORAL at 08:09

## 2019-12-05 RX ADMIN — BUDESONIDE 1000 MCG: 0.5 INHALANT RESPIRATORY (INHALATION) at 07:36

## 2019-12-05 RX ADMIN — FAMOTIDINE 10 MG: 20 TABLET ORAL at 08:08

## 2019-12-05 RX ADMIN — AMLODIPINE BESYLATE 5 MG: 5 TABLET ORAL at 08:09

## 2019-12-05 RX ADMIN — HEPARIN SODIUM 5000 UNITS: 5000 INJECTION INTRAVENOUS; SUBCUTANEOUS at 11:34

## 2019-12-05 RX ADMIN — Medication 10 ML: at 03:16

## 2019-12-05 RX ADMIN — Medication 10 ML: at 15:03

## 2019-12-05 RX ADMIN — LEVALBUTEROL HYDROCHLORIDE 1.25 MG: 1.25 SOLUTION RESPIRATORY (INHALATION) at 07:36

## 2019-12-05 RX ADMIN — LEVALBUTEROL HYDROCHLORIDE 1.25 MG: 1.25 SOLUTION RESPIRATORY (INHALATION) at 19:17

## 2019-12-05 RX ADMIN — HEPARIN SODIUM 5000 UNITS: 5000 INJECTION INTRAVENOUS; SUBCUTANEOUS at 21:59

## 2019-12-05 RX ADMIN — PREDNISONE 30 MG: 20 TABLET ORAL at 16:08

## 2019-12-05 RX ADMIN — INSULIN LISPRO 10 UNITS: 100 INJECTION, SOLUTION INTRAVENOUS; SUBCUTANEOUS at 17:12

## 2019-12-05 RX ADMIN — INSULIN GLARGINE 10 UNITS: 100 INJECTION, SOLUTION SUBCUTANEOUS at 18:47

## 2019-12-05 RX ADMIN — ASPIRIN 81 MG 81 MG: 81 TABLET ORAL at 08:09

## 2019-12-05 RX ADMIN — POTASSIUM CHLORIDE 10 MEQ: 750 TABLET, FILM COATED, EXTENDED RELEASE ORAL at 08:08

## 2019-12-05 RX ADMIN — FAMOTIDINE 10 MG: 20 TABLET ORAL at 17:13

## 2019-12-05 RX ADMIN — INSULIN LISPRO 12 UNITS: 100 INJECTION, SOLUTION INTRAVENOUS; SUBCUTANEOUS at 17:11

## 2019-12-05 RX ADMIN — INSULIN LISPRO 12 UNITS: 100 INJECTION, SOLUTION INTRAVENOUS; SUBCUTANEOUS at 08:10

## 2019-12-05 RX ADMIN — METOPROLOL SUCCINATE 12.5 MG: 25 TABLET, EXTENDED RELEASE ORAL at 11:36

## 2019-12-05 RX ADMIN — INSULIN LISPRO 7 UNITS: 100 INJECTION, SOLUTION INTRAVENOUS; SUBCUTANEOUS at 08:10

## 2019-12-05 RX ADMIN — HEPARIN SODIUM 5000 UNITS: 5000 INJECTION INTRAVENOUS; SUBCUTANEOUS at 03:15

## 2019-12-05 RX ADMIN — INSULIN LISPRO 8 UNITS: 100 INJECTION, SOLUTION INTRAVENOUS; SUBCUTANEOUS at 22:01

## 2019-12-05 RX ADMIN — NYSTATIN 500000 UNITS: 100000 SUSPENSION ORAL at 17:12

## 2019-12-05 RX ADMIN — CEFTRIAXONE SODIUM 2 G: 2 INJECTION, POWDER, FOR SOLUTION INTRAMUSCULAR; INTRAVENOUS at 16:08

## 2019-12-05 NOTE — PROGRESS NOTES
Hospitalist Progress Note    NAME: Tristen Armendariz   :  1970   MRN:  [id number]       Assessment / Plan:  Acute Hypoxic Respiratory Failure POA, improving 2/2 PNA and ARDS  -B/L PNA POA  -Leucocytosis, improving  -Initially admitted with PNA, developed ? ARDS/Pulm edema  -Continue Abx   --On p.o. steroids  -Appreciate pulmonary help  -continue Nebs  -wean O2 as tolerated  -Awaiting sputum culture  -Respiratory PCR negative for viral panel  -Hopefully will turn around soon      Diabetes, Type II: uncontrolled in patient with hyperglycemia:   -Episodes of Hypoglycemia initially  -Lantus 60 Daily, continue  Premeal insulin  SSI    GABY: resolved  -IVF's no discontinued, monitor renal function closely while diuresing    Obesity: BMI 36.73     Code Status: Full code  Surrogate Decision Maker: Wife Jodee      DVT Prophylaxis: SQ heparin  GI Prophylaxis: not indicated     Baseline: Patient is ambulatory at baseline    Pt with PNA/ARDS slowly improving, still with wheezing now on 4 L o2. Attempting to wean down. May need o2 challenge for Temp Home o2 for PNA/ARDS if he continues  remains on o2 for next 2-3 days. Subjective:     Chief Complaint / Reason for Physician Visit: follow-up pneumonia  Patient seen for follow-up    Now on 4 L o2. Pt denies any complains. No cough      Review of Systems:  Symptom Y/N Comments  Symptom Y/N Comments   Fever/Chills    Chest Pain n    Poor Appetite    Edema n    Cough    Abdominal Pain n    Sputum    Joint Pain     SOB/PETERSEN    Pruritis/Rash     Nausea/vomit    Tolerating PT/OT     Diarrhea    Tolerating Diet y    Constipation    Other       Could NOT obtain due to:      Objective:     VITALS:   Last 24hrs VS reviewed since prior progress note.  Most recent are:  Patient Vitals for the past 24 hrs:   Temp Pulse Resp BP SpO2   19 0736     94 %   19 0713 97.6 °F (36.4 °C) 91 16 144/77 94 %   19 0310 97.9 °F (36.6 °C) 91 20  98 %   19 82 Elva Velasquez  92 %   12/04/19 2359 98 °F (36.7 °C) 88 18 149/72 91 %   12/04/19 1950     91 %   12/04/19 1936 97.4 °F (36.3 °C) 89 20 149/85 92 %   12/04/19 1515 98 °F (36.7 °C) 87 20 166/74 93 %   12/04/19 1354     92 %   12/04/19 1258 97.8 °F (36.6 °C) 89 20 146/61 95 %     No intake or output data in the 24 hours ending 12/05/19 1014     PHYSICAL EXAM:  General: WD, WN. Alert, cooperative, no acute distress    EENT:  EOMI. Anicteric sclerae. MMM  Resp:  slighly improved, wheezing present b/l  CV:  Regular  rhythm,  No edema  GI:  Soft, Non distended, Non tender.  +Bowel sounds  Neurologic:  Alert and oriented X 3, normal speech,   Psych:   Good insight. Not anxious nor agitated  Skin:  No rashes. No jaundice    Reviewed most current lab test results and cultures  YES  Reviewed most current radiology test results   YES  Review and summation of old records today    NO  Reviewed patient's current orders and MAR    YES  PMH/SH reviewed - no change compared to H&P  ________________________________________________________________________  Care Plan discussed with:    Comments   Patient x    Family      RN x    Care Manager x    Consultant  x Dr Janina Dugan                    x Multidiciplinary team rounds were held today with , nursing, pharmacist and clinical coordinator. Patient's plan of care was discussed; medications were reviewed and discharge planning was addressed. ________________________________________________________________________  Total NON critical care TIME:  25   Minutes    Total CRITICAL CARE TIME Spent:   Minutes non procedure based      Comments   >50% of visit spent in counseling and coordination of care     ________________________________________________________________________  Christo Justin MD     Procedures: see electronic medical records for all procedures/Xrays and details which were not copied into this note but were reviewed prior to creation of Plan.       LABS:  I reviewed today's most current labs and imaging studies.   Pertinent labs include:  Recent Labs     12/05/19  0310 12/04/19 0442 12/02/19  1648   WBC 16.2* 17.4* 27.0*   HGB 13.2 12.5 12.5   HCT 39.6 37.5 35.9*    399 376     Recent Labs     12/05/19 0310 12/04/19 0442 12/02/19  1319   * 137 133*   K 4.4 4.0 4.4   CL 97 102 106   CO2 31 30 19*   * 180* 178*   BUN 35* 33* 38*   CREA 1.22 1.04 1.23   CA 8.8 8.1* 7.8*   MG  --  2.5*  --        Signed: Janes Yang MD

## 2019-12-05 NOTE — PROGRESS NOTES
Problem: Falls - Risk of  Goal: *Absence of Falls  Description  Document Jorge Luis Mcmanus Fall Risk and appropriate interventions in the flowsheet.   Outcome: Progressing Towards Goal  Note: Fall Risk Interventions:  Mobility Interventions: Patient to call before getting OOB         Medication Interventions: Patient to call before getting OOB

## 2019-12-05 NOTE — PROGRESS NOTES
Bedside and Verbal shift change report given to RN(oncoming nurse) by Adore TANG (offgoing nurse). Report included the following information SBAR, Kardex, MAR and Recent Results.     Zone Phone:    [phone]     Significant changes during shift:    Patient continues with elevated blood glucose levels MD notified and reviewed insulin regimen. Urine results for strep pneumonia in urine positive, Md notified patient is to continue with IV ABT.  Prevnar 13 administered in right deltoid muscle.     Patient Information     Marla Goodman  46 y.o.  11/29/2019  1:32 PM by Link Sacks, MD. Colin Phillips was admitted from home  Problem List             Patient Active Problem List     Diagnosis Date Noted    Bilateral pneumonia 11/29/2019    Severe sepsis ([address] 11/29/2019    GABY (acute kidney injury) ([address] 11/29/2019    Severe obesity ([address] 10/30/2018    Type 2 diabetes with nephropathy ([address] 03/30/2018    Vitamin D deficiency 02/27/2012    ED (erectile dysfunction) 01/09/2012    Type I diabetes mellitus, uncontrolled ([address] 09/08/2011    Essential hypertension, benign 09/08/2011    Hyperlipidemia LDL goal <70 09/08/2011    Unspecified cerebral artery occlusion with cerebral infarction 09/06/2011              Past Medical History:   Diagnosis Date    Diabetes ([address]      Other and unspecified hyperlipidemia      Stroke [facility])       TIA September 2011    TIA (transient ischemic attack) Sept 2011    Type I (juvenile type) diabetes mellitus without mention of complication, uncontrolled Age 8    Unspecified essential hypertension      Unspecified vitamin D deficiency 2/27/2012            Core Measures:     CVA: No Not applicable  CHF:No Not applicable  PNA:Yes yes        Activity Status:     OOB to Chair No  Ambulated this shift Yes   Bed Rest No     Supplemental D7: (JL Applicable)     NC yes  NRB No  Venti-mask No  On 4 Liters/min        LINES AND DRAINS:     PIV     DVT prophylaxis:     DVT prophylaxis Med- Yes  DVT prophylaxis SCD or WALESKA- No      Wounds: (If Applicable)     Wounds- No     Location none     Patient Safety:     Falls Score Total Score: 1  Safety Level_______  Bed Alarm On? No  Sitter? No     Plan for upcoming shift:       Discharge Plan: Ongoing TBD (possibly 12/6/19)     Active Consults:  None

## 2019-12-05 NOTE — PROGRESS NOTES
ADULT PROTOCOL: JET AEROSOL  REASSESSMENT    Patient  Pedro Hu     46 y.o.   male     12/5/2019  9:37 AM    Breath Sounds Pre Procedure: Right Breath Sounds: Coarse, Scattered wheezing                               Left Breath Sounds: Coarse, Scattered wheezing    Breath Sounds Post Procedure: Right Breath Sounds: Coarse, Expiratory wheezing                                 Left Breath Sounds: Coarse, Expiratory wheezing    Breathing pattern: Pre procedure Breathing Pattern: Regular          Post procedure Breathing Pattern: Regular    Heart Rate: Pre procedure Pulse: 92           Post procedure Pulse: 96    Resp Rate: Pre procedure Respirations: 16           Post procedure Respirations: 16            Cough: Pre procedure Cough: Productive               Post procedure Cough: Non-productive      Sputum: Pre procedure Sputum amount: Moderate  Sputum color/odor: White  Sputum consistency:  Thick                   Oxygen: O2 Device: Nasal cannula   5 lpm, decreased to 4 lpm     Changed: YES    SpO2: Pre procedure SpO2: 94 %   with oxygen              Post procedure SpO2: 95 %  with oxygen    Nebulizer Therapy: Current medications Aerosolized Medications: Pulmicort, Xopenex      Changed: NO        Problem List:   Patient Active Problem List   Diagnosis Code    Unspecified cerebral artery occlusion with cerebral infarction I63.50    Type I diabetes mellitus, uncontrolled ([facility] E10.65    Essential hypertension, benign I10    Hyperlipidemia LDL goal <70 E78.5    ED (erectile dysfunction) N52.9    Vitamin D deficiency E55.9    Type 2 diabetes with nephropathy (HCC) E11.21    Severe obesity (HCC) E66.01    Bilateral pneumonia J18.9    Severe sepsis (HCC) A41.9, R65.20    GABY (acute kidney injury) ([facility] N17.9       Respiratory Therapist: Helio Pugh RT

## 2019-12-05 NOTE — PROGRESS NOTES
Md notified of blood glucose of 395 prior to lunch Per MD give patient his scheduled 12 units before meals and 10 units coverage.

## 2019-12-05 NOTE — PROGRESS NOTES
Bedside and Verbal shift change report given to [facility] RN(oncoming nurse) by Krissy RN (offgoing nurse). Report included the following information SBAR, Kardex, MAR and Recent Results.     Zone Phone:         Significant changes during shift:    Pt with elevated sugar during hour of sleep fingerstick. Hospitalist paged.  Pt received a one time order of humalog, 10 units.     Patient Information     Sam Morgan  46 y.o.  11/29/2019  1:32 PM by Micky Fuentes MD. Colin Phillips was admitted from home  Problem List             Patient Active Problem List     Diagnosis Date Noted    Bilateral pneumonia 11/29/2019    Severe sepsis ([address] 11/29/2019    GABY (acute kidney injury) ([address] 11/29/2019    Severe obesity ([address] 10/30/2018    Type 2 diabetes with nephropathy ([address] 03/30/2018    Vitamin D deficiency 02/27/2012    ED (erectile dysfunction) 01/09/2012    Type I diabetes mellitus, uncontrolled ([address] 09/08/2011    Essential hypertension, benign 09/08/2011    Hyperlipidemia LDL goal <70 09/08/2011    Unspecified cerebral artery occlusion with cerebral infarction 09/06/2011              Past Medical History:   Diagnosis Date    Diabetes ([address]      Other and unspecified hyperlipidemia      Stroke [facility])       TIA September 2011    TIA (transient ischemic attack) Sept 2011    Type I (juvenile type) diabetes mellitus without mention of complication, uncontrolled Age 8    Unspecified essential hypertension      Unspecified vitamin D deficiency 2/27/2012            Core Measures:     CVA: No Not applicable  CHF:No Not applicable  PNA:Yes yes        Activity Status:     OOB to Chair No  Ambulated this shift Yes   Bed Rest No     Supplemental K2: (OX Applicable)     NC yes  NRB No  Venti-mask No  On 4 Liters/min        LINES AND DRAINS:     PIV     DVT prophylaxis:     DVT prophylaxis Med- Yes  DVT prophylaxis SCD or WALESKA- No      Wounds: (If Applicable)     Wounds- No     Location none     Patient Safety:     Falls Score Total Score: 1  Safety Level_______  Bed Alarm On? No  Sitter? No     Plan for upcoming shift:   breathing treatments,   Respiratory Excersise  Q4 hour BG checks,   monitor I & O  Safety    Needs repeat Chest xray     Discharge Plan: Ongoing     Active Consults:  None

## 2019-12-05 NOTE — DIABETES MGMT
[facility]    [facility] Progress Note    Recommendations/ Comments: If appropriate, please consider:  1) Adding NPH 6 units linked and dose with Prednisone dose to address glycemic effects of steroids. Current hospital DM medication: Lantus 70 units,    Chart reviewed on [address]. Patient is a 46 y.o. male with known DM on Tresiba 100 units daily, Novolog prandial dosing. A1c:   Lab Results   Component Value Date/Time    Hemoglobin A1c 8.1 (H) 10/22/2019 09:01 AM    Hemoglobin A1c 9.9 (H) 04/23/2019 04:15 PM    Hemoglobin A1c, External 8.1 03/29/2018       Recent Glucose Results:   Lab Results   Component Value Date/Time     (H) 12/05/2019 03:10 AM    GLUCPOC 395 (H) 12/05/2019 11:15 AM    GLUCPOC 296 (H) 12/05/2019 06:05 AM    GLUCPOC 367 (H) 12/04/2019 09:06 PM        Lab Results   Component Value Date/Time    Creatinine 1.22 12/05/2019 03:10 AM     CrCl cannot be calculated (Unknown ideal weight. ). Active Orders   Diet    DIET DIABETIC WITH OPTIONS Consistent Carb 1800kcal; Regular        PO intake:   Patient Vitals for the past 72 hrs:   % Diet Eaten   12/02/19 1750 100 %       Will continue to follow as needed.     Thank you  [address]    Time spent: 10 minutes

## 2019-12-05 NOTE — PROGRESS NOTES
Progress Note      12/5/2019 8:11 AM  NAME: Kathleen Rice   MRN:  [id number]   Admit Diagnosis: Bilateral pneumonia [J18.9]  Severe sepsis ([facility] [address] [A41.9, R65.20]  GABY (acute kidney injury) ([facility] [N17.9]                Assessment:       Cough, chills, pneumonia, leukocytosis, GABY  Acute diastolic chf vs. ARDS.    - No hx of CAD  - Echo 2011 EF 60%, Echo 12/2019 EF 55%, mild to mod MR  - sinus  - Hypertensive heart disease with CKD  - Insulin DM Dr. Jerry Weathers  - Dyslipidemia  - CVA in 2011  - Obese/Snores, recommended sleep study  , 2 kids, works for [employer] and as an [employer], physical job                     Plan:        Initial presentation consistent with pneumonia with fever, chills, leukocytosis. GABY, HCTZ held and given diuretic, hydration, but then possible acute diastolic chf.    Echo normal EF, mild to mod MR  Outpt sleep study  CXR improving     1. Cont ASA  2. Cont added metoprolol succ 25mg qd  3. Cont amlodipine 5mg daily, was on 10mg as outpt  4. Holding lisinopril, resume at discharge  5. Stop HCTZ, cont with lasix 40mg po bid, likely discharge on every day with kcl 10meq daily, follow bmp  6. Cont crestor     Appreciate pulmonary evaluation, on steroids as well. Improving, CXR improving, tapering O2  Follow up with me or NP in 2-4 weeks. Will sign off, Dr. Arely Van available as needed.          [x]? High complexity decision making was performed            Subjective:     Kathleen Rice denies chest pain, +dyspnea. Discussed with RN events overnight. Review of Systems:    Symptom Y/N Comments  Symptom Y/N Comments   Fever/Chills N   Chest Pain N    Poor Appetite N   Edema N    Cough N   Abdominal Pain N    Sputum N   Joint Pain N    SOB/PETERSEN Y   Pruritis/Rash N    Nausea/vomit N   Tolerating PT/OT Y    Diarrhea N   Tolerating Diet Y    Constipation N   Other       Could NOT obtain due to:      Objective:      Physical Exam:    Last 24hrs VS reviewed since prior progress note. Most recent are:    Visit Vitals  /77   Pulse 91   Temp 97.6 °F (36.4 °C)   Resp 16   Wt 97.1 kg (214 lb)   SpO2 94%   BMI 36.73 kg/m²     No intake or output data in the 24 hours ending 12/05/19 0908     General Appearance: Well developed, well nourished, alert & oriented x 3,    no acute distress. Ears/Nose/Mouth/Throat: Hearing grossly normal.  Neck: Supple. Chest: Lungs diffuse wheeze to auscultation bilaterally. Cardiovascular: Regular rate and rhythm, S1S2 normal, no murmur. Abdomen: Soft, non-tender, bowel sounds are active. Extremities: No edema bilaterally. Skin: Warm and dry. PMH/SH reviewed - no change compared to H&P    Data Review    Telemetry: normal sinus rhythm     Lab Data Personally Reviewed:    Recent Labs     12/05/19 0310 12/04/19 0442   WBC 16.2* 17.4*   HGB 13.2 12.5   HCT 39.6 37.5    399     No results for input(s): INR, PTP, APTT, INREXT, INREXT in the last 72 hours. Recent Labs     12/05/19  0310 12/04/19  0442 12/02/19  1319   * 137 133*   K 4.4 4.0 4.4   CL 97 102 106   CO2 31 30 19*   BUN 35* 33* 38*   CREA 1.22 1.04 1.23   * 180* 178*   CA 8.8 8.1* 7.8*   MG  --  2.5*  --      No results for input(s): CPK, CKNDX, TROIQ in the last 72 hours. No lab exists for component: CPKMB  Lab Results   Component Value Date/Time    Cholesterol, total 190 10/22/2019 09:01 AM    HDL Cholesterol 37 (L) 10/22/2019 09:01 AM    LDL,Direct 171 (H) 09/07/2011 03:46 AM    LDL-CHOLESTEROL 123 (H) 10/22/2019 09:01 AM    LDL, calculated 112 (H) 09/11/2017 08:55 AM    Triglyceride 186 (H) 10/22/2019 09:01 AM    CHOL/HDL Ratio 8.0 (H) 09/08/2011 04:21 AM    Cholesterol/HDL ratio 5.1 (H) 10/22/2019 09:01 AM       No results for input(s): SGOT, GPT, AP, TBIL, TP, ALB, GLOB, GGT, AML, LPSE in the last 72 hours. No lab exists for component: AMYP, HLPSE  No results for input(s): PH, PCO2, PO2 in the last 72 hours.     Medications Personally Reviewed:    Current Facility-Administered Medications   Medication Dose Route Frequency    insulin lispro (HUMALOG) injection   SubCUTAneous AC&HS    amLODIPine (NORVASC) tablet 5 mg  5 mg Oral DAILY    budesonide (PULMICORT) 500 mcg/2 ml nebulizer suspension  1,000 mcg Nebulization BID RT    furosemide (LASIX) tablet 40 mg  40 mg Oral BID    insulin glargine (LANTUS) injection 70 Units  70 Units SubCUTAneous DAILY    pneumococcal 13 yuan conj dip (PREVNAR-13) injection 0.5 mL  0.5 mL IntraMUSCular PRIOR TO DISCHARGE    influenza vaccine 2019-20 (6 mos+)(PF) (FLUARIX/FLULAVAL/FLUZONE QUAD) injection 0.5 mL  0.5 mL IntraMUSCular PRIOR TO DISCHARGE    levalbuterol (XOPENEX) nebulizer soln 1.25 mg/3 mL  1.25 mg Nebulization Q6H RT    predniSONE (DELTASONE) tablet 30 mg  30 mg Oral BID WITH MEALS    oxymetazoline (AFRIN) 0.05 % nasal spray 2 Spray  2 Spray Both Nostrils BID PRN    sodium chloride (AYR SALINE) 0.65 % nasal drops 2 Drop  2 Drop Both Nostrils Q2H PRN    famotidine (PEPCID) tablet 10 mg  10 mg Oral BID    potassium chloride SR (KLOR-CON 10) tablet 10 mEq  10 mEq Oral BID    insulin lispro (HUMALOG) injection 12 Units  12 Units SubCUTAneous TIDAC    glucose chewable tablet 16 g  4 Tab Oral PRN    dextrose (D50W) injection syrg 12.5-25 g  12.5-25 g IntraVENous PRN    zolpidem (AMBIEN) tablet 5 mg  5 mg Oral QHS PRN    metoprolol tartrate (LOPRESSOR) tablet 12.5 mg  12.5 mg Oral BID    cefTRIAXone (ROCEPHIN) 2 g in 0.9% sodium chloride (MBP/ADV) 50 mL  2 g IntraVENous Q24H    azithromycin (ZITHROMAX) 500 mg in 0.9% sodium chloride (MBP/ADV) 250 mL  500 mg IntraVENous Q24H    acetaminophen (TYLENOL) tablet 650 mg  650 mg Oral Q6H PRN    ondansetron (ZOFRAN) injection 4 mg  4 mg IntraVENous Q4H PRN    aspirin chewable tablet 81 mg  81 mg Oral DAILY    DULoxetine (CYMBALTA) capsule 30 mg  30 mg Oral BID    dextrose (D50) infusion 12.5-25 g  12.5-25 g IntraVENous PRN    glucagon (GLUCAGEN) injection 1 mg  1 mg IntraMUSCular PRN    sodium chloride (NS) flush 5-40 mL  5-40 mL IntraVENous Q8H    sodium chloride (NS) flush 5-40 mL  5-40 mL IntraVENous PRN    heparin (porcine) injection 5,000 Units  5,000 Units SubCUTAneous Q8H    lactobac ac& pc-s.therm-b.anim (JASON Q/RISAQUAD)  1 Cap Oral DAILY    guaiFENesin ER (MUCINEX) tablet 600 mg  600 mg Oral Q12H    guaiFENesin-dextromethorphan (ROBITUSSIN DM) 100-10 mg/5 mL syrup 10 mL  10 mL Oral Q6H PRN         Karan Motta MD

## 2019-12-05 NOTE — PROGRESS NOTES
Problem: Pneumonia: Discharge Outcomes  Goal: *Demonstrates progressive activity  Outcome: Progressing Towards Goal  Goal: *Describes follow-up/return visits to physicians  Outcome: Progressing Towards Goal  Goal: *Tolerating diet  Outcome: Progressing Towards Goal  Goal: *Verbalizes name, dosage, time, side effects, and number of days to continue medications  Outcome: Progressing Towards Goal  Goal: *Influenza immunization  Outcome: Progressing Towards Goal  Goal: *Pneumococcal immunization  Outcome: Progressing Towards Goal  Goal: *Respiratory status at baseline  Outcome: Progressing Towards Goal  Goal: *Vital signs within defined limits  Outcome: Progressing Towards Goal  Goal: *Describes available resources and support systems  Outcome: Progressing Towards Goal  Goal: *Optimal pain control at patient's stated goal  Outcome: Progressing Towards Goal     Problem: Falls - Risk of  Goal: *Absence of Falls  Description  Document Sherlyn Velasquez Fall Risk and appropriate interventions in the flowsheet.   Outcome: Progressing Towards Goal  Note: Fall Risk Interventions:  Mobility Interventions: Patient to call before getting OOB         Medication Interventions: Patient to call before getting OOB

## 2019-12-05 NOTE — PROGRESS NOTES
MD notified of blood glucose 404, per Md administer 12 units schedule with meals and 10 units humalog coverage, Md will reassess insulin regimen.

## 2019-12-05 NOTE — PROGRESS NOTES
Initial Nutrition Assessment:    INTERVENTIONS/RECOMMENDATIONS:   · Continue current diet    ASSESSMENT:   Patient medically noted for bilateral pneumonia, sepsis, GABY, acute respiratory failure, and pulmonary edema. PMH for DM and HTN. Chart reviewed for length of stay. Patient reports an excellent appetite and eating very well. Provided with menu during visit. Aware of room service. Continue CCD to aid in BG control. No nutrition questions/concerns from patient/family. Encouraged intake of meals. Diet Order: Consistent carb  % Eaten:    Patient Vitals for the past 72 hrs:   % Diet Eaten   12/02/19 1750 100 %       Pertinent Medications: [x]Reviewed []Other: Norvasc, Cymbalta, Famotidine, lasix, Lantus, Humalog, Eliz Q, KCl, Prednisone  Pertinent Labs: [x]Reviewed []Other: [phone]  Food Allergies: [x]None []Yes:    Last BM: 12/4  [x]Active     []Hyperactive  []Hypoactive       [] Absent BS  Skin:    [x] Intact   [] Incision  [] Breakdown: [] Edema []Other:    Anthropometrics:   Height:   Weight: 97.1 kg (214 lb)   IBW (%IBW):   ( ) UBW (%UBW):   (  %)   Last Weight Metrics:  Weight Loss Metrics 11/30/2019 11/1/2019 4/30/2019 10/30/2018 3/30/2018 9/25/2017 4/21/2017   Today's Wt 214 lb 213 lb 214 lb 6.4 oz 210 lb 8 oz 203 lb 3.2 oz 212 lb 12.8 oz 213 lb 3.2 oz   BMI 36.73 kg/m2 36.56 kg/m2 36.8 kg/m2 36.13 kg/m2 34.88 kg/m2 36.53 kg/m2 36.6 kg/m2       BMI: Body mass index is 36.73 kg/m². This BMI is indicative of:   []Underweight    []Normal    []Overweight    [x] Obesity   [] Extreme Obesity (BMI>40)     Estimated Nutrition Needs (Based on):   1940 Kcals/day(20 kcal/kg) , 78 g(0.8 g/kg bw) Protein  Carbohydrate:  At Least 130 g/day  Fluids: 1950 mL/day (1ml/kcal)    Pt expected to meet estimated nutrient needs: [x]Yes []No    NUTRITION DIAGNOSES:   Problem:  Altered nutrition-related lab values      Etiology: related to DM, steroids     Signs/Symptoms: as evidenced by [phone] NUTRITION INTERVENTIONS:  Meals/Snacks: General/healthful diet                  GOAL:   PO intake >70% of meals and BG trend <200 next 5-7 days    LEARNING NEEDS (Diet, Food/Nutrient-Drug Interaction):    [x] None Identified   [] Identified and Education Provided/Documented   [] Identified and Pt declined/was not appropriate     Cultural, Methodist, OR Ethnic Dietary Needs:    [x] None Identified   [] Identified and Addressed     [x] Interdisciplinary Care Plan Reviewed/Documented    [x] Discharge Planning: Consistent carb diet/2g Na       MONITORING /EVALUATION:   Food/Nutrient Intake Outcomes:  Total energy intake  Physical Signs/Symptoms Outcomes: Weight/weight change, Glucose profile, Electrolyte and renal profile    NUTRITION RISK:    [] Patient At Nutritional Risk              [x] Patient Not at Nutritional Risk    PT SEEN FOR:    []  MD Consult: []Calorie Count      []Diabetic Diet Education        []Diet Education     []Electrolyte Management     []General Nutrition Management and Supplements     []Management of Tube Feeding     []TPN Recommendations    []  RN Referral:  []MST score >=2     []Enteral/Parenteral Nutrition PTA     []Pregnant: Gestational DM or Multigestation     []Pressure Ulcer/Wound Care needs        []  Low BMI  [x]  [facility] [phone]  Pager [phone]    Weekend Pager [phone]

## 2019-12-05 NOTE — CONSULTS
[facility]  Pulmonary, Critical Care, and Sleep Medicine   Patient progress     Name: Bogdan Salgado MRN: [id number]   : 1970 Hospital: [address]   Date: 2019        IMPRESSION:   · Pneumonia- ?cap or atypical or viral - no vaping . - has evolved suspect \"Leaky lungs\". - cxr today looks much better with decreased infiltrates . So far tests back are neg -- sputum culture is pd -  Few gram positive cocci. · Acute hypoxic resp failure - sat 91 to 93 % on 4 liter nc. -  This has been slow to recover. · Diabetic  · Suspected ady  · Mild uli  · Leukocytosis  · Bronchospasm-asthmatic bronchitis- sounds a little better today   · Mild to moderate mr with pasp 33   · Elevated probnp   · Wheezing - he denies hx of this -  Likely related to the acute resp  Infection       RECOMMENDATIONS:   · Agree with the comprehensive care provided by hospitalist team.   · o2  And wean as able   · No need for fob at this time- not suspected to be increased risk for opportunistic infection   · Rt / nebs - wife  Has clean neb machine at home and he will use hers with new tubing . Will need med - change to albuterol  qid and budesonide . 5mg bid at dc   ·  continue close monitoring  · Agree with careful diuresis in view of suspected leaky lungs - drop to once a day   · Agree with viral panel, urine ag - fu ag - strep positive   · Will send hypersensitivity panel to cover bases - pd   · He says hiv status is neg but is not sure when it was checked. . By hx  Extremely low risk  So will hold on checking for now . As cxr is better not needed. · Agree with restarting  Steroid- may just try po pred and continue bls monitoring. - drop further if better in am    · Continue current ab - set stop date  Pd repeat cxr. zithromax dc today   · Discussed possible dc plans with pt , wife and Dr. Holly Schwartz . Subjective:      This patient has been seen and evaluated at the request of Dr. Holly Schwartz  For acute hypoxic resp failure and possible ards. . Patient is a 46 y.o. male  Who resport a cough for about 2 week. Sputum was initially brown with no heme. He progressed on thanksgiving day with chills , increased sob and worsening fatigue. He presented  And was admitted on 11/29. Cxr has progressed from 11/29 to 12/ 2 . He required increased o2 and  Is currently at 5 liter nc. He says he feels better. He has been comprehensively managed. So far bc ng . Sick contact was his wife who returned from a trip out of [facility] where several family members were sick and then she got sick with a colde that evolved into bronchitis. ( She was on ab and pred and inhaler. )    Pt denies smoking,  Vaping. He admits to exposure to nitric acid, hydrochloric acid , acetate and natha in his occupation as an  but says the area is well ventilated and he has been around this for years. 12/3- says he  Feels fine. His sputum is now tan and a little more productive per his report . 12/4- feeling fine again . Past Medical History:   Diagnosis Date    Diabetes ([address]     Other and unspecified hyperlipidemia     Stroke [facility])     TIA September 2011    TIA (transient ischemic attack) Sept 2011    Type I (juvenile type) diabetes mellitus without mention of complication, uncontrolled Age 8    Unspecified essential hypertension     Unspecified vitamin D deficiency 2/27/2012      Past Surgical History:   Procedure Laterality Date    HX CATARACT REMOVAL Left 03/2019      Prior to Admission medications    Medication Sig Start Date End Date Taking? Authorizing Provider   DULoxetine (CYMBALTA) 30 mg capsule Take 1 Cap by mouth two (2) times a day.  11/22/19  Yes Kristina Mathew MD   hydroCHLOROthiazide (HYDRODIURIL) 25 mg tablet TAKE 1 TABLET BY MOUTH EVERY DAY 11/7/19  Yes Kristina Mathew MD   lisinopril (PRINIVIL, ZESTRIL) 40 mg tablet TAKE 1 TABLET DAILY 11/2/19  Yes Krisitna Mathew MD   insulin aspart U-100 (NOVOLOG FLEXPEN U-100 INSULIN) 100 unit/mL (3 mL) inpn FOR DIRECTIONS ON HOW TO   TAKE THIS MEDICINE, READ   THE ENCLOSED MEDICATION    INFORMATION FORM 11/2/19  Yes Maricarmen Hall MD   amLODIPine (NORVASC) 10 mg tablet TAKE 1 TABLET DAILY 11/2/19  Yes Maricarmen Hall MD   naproxen (NAPROSYN) 500 mg tablet Take 500 mg by mouth as needed. 10/30/19  Yes Provider, Historical   b complex vitamins (B COMPLEX 1) tablet Take 1 Tab by mouth daily. Yes Provider, Historical   TRESIBA FLEXTOUCH U-200 200 unit/mL (3 mL) inpn Inject 100 units in the morning--dispense 18 pens for 90 day supply--Dose change 11/1/19--updated med list--did not send prescription to the pharmacy 11/1/19  Yes Maricarmen Hall MD   rosuvastatin (CRESTOR) 20 mg tablet Take 1/2 tab daily--Dose change 1/17/19--updated med list--did not send prescription to the pharmacy 1/17/19  Yes Maricarmen Hall MD   Insulin Needles, Disposable, (BD ULTRA-FINE MINI PEN NEEDLE) 31 gauge x 3/16\" ndle Use as directed 3 times daily 10/30/18  Yes Maricarmen Hall MD   insulin syringe-needle U-100 1 mL 31 gauge x 15/64\" syrg 1 Syringe by SubCUTAneous route five (5) times daily. 7/2/18  Yes Maricarmen Hall MD   aspirin 81 mg chewable tablet Take 81 mg by mouth daily. Yes Provider, Historical   ONETOUCH ULTRA TEST strip Test up to 6 times daily. Dx: 250.03 4/2/15  Yes Maricarmen Hall MD   [facility]. 33 gauge misc Use as directed up to 6 times daily. Dx: 250.03 4/2/15  Yes Maricarmen Hall MD   cholecalciferol, vitamin D3, (VITAMIN D3) 2,000 unit Tab Take  by mouth daily. Yes Provider, Historical   MV-MN/FA/D3/LYCOPENE/LUT/COQ10 (DAILY MULTIVITAMIN PO) Take  by mouth.    Yes Provider, Historical   TRESIBA FLEXTOUCH U-200 200 unit/mL (3 mL) inpn INJECT 110  UNITS IN THE MORNING 11/2/19   Maricarmen Hall MD     No Known Allergies   Social History     Tobacco Use    Smoking status: Never Smoker    Smokeless tobacco: Never Used   Substance Use Topics    Alcohol use: Yes     Alcohol/week: 1.0 standard drinks     Types: 1 Cans of beer per week     Comment: Rare beer   , wife was present, works as an  and works with ups [employer] to [employer] .   Family History   Problem Relation Age of Onset    Cancer Father         Bone cancer    Heart Attack Maternal Grandmother     Heart Attack Maternal Grandfather     Stroke Maternal Grandfather     Heart Attack Paternal Grandmother     Heart Attack Paternal Grandfather     Diabetes Other         2 nieces with Type 1 diabetes    Heart Disease Neg Hx     vaccines- did not get the flu shot this year. Has not had the pneumovax.      Current Facility-Administered Medications   Medication Dose Route Frequency    [START ON 12/6/2019] metoprolol succinate (TOPROL-XL) XL tablet 25 mg  25 mg Oral DAILY    nystatin (MYCOSTATIN) 100,000 unit/mL oral suspension 500,000 Units  500,000 Units Oral QID    [START ON 12/6/2019] furosemide (LASIX) tablet 40 mg  40 mg Oral DAILY    insulin lispro (HUMALOG) injection   SubCUTAneous AC&HS    amLODIPine (NORVASC) tablet 5 mg  5 mg Oral DAILY    budesonide (PULMICORT) 500 mcg/2 ml nebulizer suspension  1,000 mcg Nebulization BID RT    insulin glargine (LANTUS) injection 70 Units  70 Units SubCUTAneous DAILY    influenza vaccine 2019-20 (6 mos+)(PF) (FLUARIX/FLULAVAL/FLUZONE QUAD) injection 0.5 mL  0.5 mL IntraMUSCular PRIOR TO DISCHARGE    levalbuterol (XOPENEX) nebulizer soln 1.25 mg/3 mL  1.25 mg Nebulization Q6H RT    predniSONE (DELTASONE) tablet 30 mg  30 mg Oral BID WITH MEALS    famotidine (PEPCID) tablet 10 mg  10 mg Oral BID    potassium chloride SR (KLOR-CON 10) tablet 10 mEq  10 mEq Oral BID    insulin lispro (HUMALOG) injection 12 Units  12 Units SubCUTAneous TIDAC    cefTRIAXone (ROCEPHIN) 2 g in 0.9% sodium chloride (MBP/ADV) 50 mL  2 g IntraVENous Q24H    aspirin chewable tablet 81 mg  81 mg Oral DAILY    DULoxetine (CYMBALTA) capsule 30 mg  30 mg Oral BID    sodium chloride (NS) flush 5-40 mL  5-40 mL IntraVENous Q8H    heparin (porcine) injection 5,000 Units  5,000 Units SubCUTAneous Q8H    lactobac ac& pc-s.therm-b.anim (JASON Q/RISAQUAD)  1 Cap Oral DAILY    guaiFENesin ER (MUCINEX) tablet 600 mg  600 mg Oral Q12H       Review of Systems:  A comprehensive review of systems was negative except for that written in the HPI. Objective:   Vital Signs:    Visit Vitals  /71 (BP 1 Location: Right arm, BP Patient Position: At rest)   Pulse 89   Temp 97.6 °F (36.4 °C)   Resp 16   Wt 97.1 kg (214 lb)   SpO2 92%   BMI 36.73 kg/m²       O2 Device: Nasal cannula   O2 Flow Rate (L/min): 4 l/min   Temp (24hrs), Av.7 °F (36.5 °C), Min:97.4 °F (36.3 °C), Max:98 °F (36.7 °C)       Intake/Output:   Last shift:      No intake/output data recorded. Last 3 shifts: No intake/output data recorded. No intake or output data in the 24 hours ending 19 1548   Physical Exam:   General:  Alert, cooperative, no distress, appears stated age. Stocky    Head:  Normocephalic, without obvious abnormality, atraumatic. Eyes:  Conjunctivae/corneas clear. PERRL, EOMs intact. Nose: Nares normal. Septum midline. Mucosa normal. No drainage or sinus tenderness. Throat: Lips, mucosa, and tongue normal. Teeth and gums normal. M4 airway    Neck: Supple, symmetrical, trachea midline, no adenopathy, broad   Back:   Symmetric, no curvature. ROM normal.   Lungs:     Decreased rales and has insp / exp bronchial bs and mild wheezing . Wheezing and freire are better today    Chest wall:  No tenderness or deformity. Heart:  Regular rate and rhythm, S1, S2 normal, no murmur, click, rub or gallop. softmurmur   Abdomen:   Soft, non-tender. Extremities: Extremities normal, atraumatic, no cyanosis .  Mild pedal edema    Pulses: Not examined    Skin: Skin color, texture, turgor normal. No rashes or lesions   Lymph nodes: Cervical, supraclavicular,  nodes normal. Neurologic: Grossly nonfocal. Alert and oriented      Data review:     Recent Results (from the past 24 hour(s))   GLUCOSE, POC    Collection Time: 12/04/19  4:19 PM   Result Value Ref Range    Glucose (POC) 306 (H) 65 - 100 mg/dL    Performed by Margarita Medrano    GLUCOSE, POC    Collection Time: 12/04/19  9:06 PM   Result Value Ref Range    Glucose (POC) 367 (H) 65 - 100 mg/dL    Performed by Evelin Albert (PCT)    CBC WITH AUTOMATED DIFF    Collection Time: 12/05/19  3:10 AM   Result Value Ref Range    WBC 16.2 (H) 4.1 - 11.1 K/uL    RBC 4.29 4. 10 - 5.70 M/uL    HGB 13.2 12.1 - 17.0 g/dL    HCT 39.6 36.6 - 50.3 %    MCV 92.3 80.0 - 99.0 FL    MCH 30.8 26.0 - 34.0 PG    MCHC 33.3 30.0 - 36.5 g/dL    RDW 12.4 11.5 - 14.5 %    PLATELET 331 085 - 375 K/uL    MPV 11.6 8.9 - 12.9 FL    NRBC 0.0 0  WBC    ABSOLUTE NRBC 0.00 0.00 - 0.01 K/uL    NEUTROPHILS 82 (H) 32 - 75 %    BAND NEUTROPHILS 7 %    LYMPHOCYTES 5 (L) 12 - 49 %    MONOCYTES 2 (L) 5 - 13 %    EOSINOPHILS 0 0 - 7 %    BASOPHILS 0 0 - 1 %    METAMYELOCYTES 1 %    MYELOCYTES 2 %    PROMYELOCYTES 1 %    IMMATURE GRANULOCYTES 0 0.0 - 0.5 %    ABS. NEUTROPHILS 14.4 (H) 1.8 - 8.0 K/UL    ABS. LYMPHOCYTES 0.8 0.8 - 3.5 K/UL    ABS. MONOCYTES 0.3 0.0 - 1.0 K/UL    ABS. EOSINOPHILS 0.0 0.0 - 0.4 K/UL    ABS. BASOPHILS 0.0 0.0 - 0.1 K/UL    ABS. IMM.  GRANS. 0.0 0.00 - 0.04 K/UL    DF MANUAL      RBC COMMENTS NORMOCYTIC, NORMOCHROMIC     METABOLIC PANEL, BASIC    Collection Time: 12/05/19  3:10 AM   Result Value Ref Range    Sodium 133 (L) 136 - 145 mmol/L    Potassium 4.4 3.5 - 5.1 mmol/L    Chloride 97 97 - 108 mmol/L    CO2 31 21 - 32 mmol/L    Anion gap 5 5 - 15 mmol/L    Glucose 300 (H) 65 - 100 mg/dL    BUN 35 (H) 6 - 20 MG/DL    Creatinine 1.22 0.70 - 1.30 MG/DL    BUN/Creatinine ratio 29 (H) 12 - 20      GFR est AA >60 >60 ml/min/1.73m2    GFR est non-AA >60 >60 ml/min/1.73m2    Calcium 8.8 8.5 - 10.1 MG/DL   GLUCOSE, POC    Collection Time: 12/05/19  6:05 AM   Result Value Ref Range    Glucose (POC) 296 (H) 65 - 100 mg/dL    Performed by Evelin Albert (PCT)    GLUCOSE, POC    Collection Time: 12/05/19 11:15 AM   Result Value Ref Range    Glucose (POC) 395 (H) 65 - 100 mg/dL    Performed by Jorge Ortega (PCT)        Imaging:  I have personally reviewed the patients radiographs and have reviewed the reports:  Progression of asdz from 11/29 to 12/2 but I think cxr today looks a little better than yesterday . 12/4- cxr is better with decreased infiltrates.       Mark Lucas MD

## 2019-12-06 ENCOUNTER — APPOINTMENT (OUTPATIENT)
Dept: GENERAL RADIOLOGY | Age: 49
DRG: 871 | End: 2019-12-06
Attending: INTERNAL MEDICINE
Payer: COMMERCIAL

## 2019-12-06 LAB
BASOPHILS # BLD: 0 K/UL (ref 0–0.1)
BASOPHILS NFR BLD: 0 % (ref 0–1)
DIFFERENTIAL METHOD BLD: ABNORMAL
EOSINOPHIL # BLD: 0 K/UL (ref 0–0.4)
EOSINOPHIL NFR BLD: 0 % (ref 0–7)
ERYTHROCYTE [DISTWIDTH] IN BLOOD BY AUTOMATED COUNT: 12.2 % (ref 11.5–14.5)
GLUCOSE BLD STRIP.AUTO-MCNC: 113 MG/DL (ref 65–100)
GLUCOSE BLD STRIP.AUTO-MCNC: 197 MG/DL (ref 65–100)
GLUCOSE BLD STRIP.AUTO-MCNC: 293 MG/DL (ref 65–100)
GLUCOSE BLD STRIP.AUTO-MCNC: 334 MG/DL (ref 65–100)
HCT VFR BLD AUTO: 36.7 % (ref 36.6–50.3)
HGB BLD-MCNC: 12.4 G/DL (ref 12.1–17)
IMM GRANULOCYTES # BLD AUTO: 0 K/UL (ref 0–0.04)
IMM GRANULOCYTES NFR BLD AUTO: 0 % (ref 0–0.5)
LYMPHOCYTES # BLD: 1.3 K/UL (ref 0.8–3.5)
LYMPHOCYTES NFR BLD: 8 % (ref 12–49)
MCH RBC QN AUTO: 31 PG (ref 26–34)
MCHC RBC AUTO-ENTMCNC: 33.8 G/DL (ref 30–36.5)
MCV RBC AUTO: 91.8 FL (ref 80–99)
METAMYELOCYTES NFR BLD MANUAL: 1 %
MONOCYTES # BLD: 1.6 K/UL (ref 0–1)
MONOCYTES NFR BLD: 10 % (ref 5–13)
MYELOCYTES NFR BLD MANUAL: 1 %
NEUTS BAND NFR BLD MANUAL: 3 %
NEUTS SEG # BLD: 12.8 K/UL (ref 1.8–8)
NEUTS SEG NFR BLD: 77 % (ref 32–75)
NRBC # BLD: 0 K/UL (ref 0–0.01)
NRBC BLD-RTO: 0 PER 100 WBC
PLATELET # BLD AUTO: 420 K/UL (ref 150–400)
PMV BLD AUTO: 11 FL (ref 8.9–12.9)
RBC # BLD AUTO: 4 M/UL (ref 4.1–5.7)
RBC MORPH BLD: ABNORMAL
SERVICE CMNT-IMP: ABNORMAL
WBC # BLD AUTO: 16 K/UL (ref 4.1–11.1)

## 2019-12-06 PROCEDURE — 74011250637 HC RX REV CODE- 250/637: Performed by: INTERNAL MEDICINE

## 2019-12-06 PROCEDURE — 94760 N-INVAS EAR/PLS OXIMETRY 1: CPT

## 2019-12-06 PROCEDURE — 94640 AIRWAY INHALATION TREATMENT: CPT

## 2019-12-06 PROCEDURE — 74011250636 HC RX REV CODE- 250/636: Performed by: INTERNAL MEDICINE

## 2019-12-06 PROCEDURE — 85025 COMPLETE CBC W/AUTO DIFF WBC: CPT

## 2019-12-06 PROCEDURE — 74011636637 HC RX REV CODE- 636/637: Performed by: INTERNAL MEDICINE

## 2019-12-06 PROCEDURE — 74011000250 HC RX REV CODE- 250: Performed by: INTERNAL MEDICINE

## 2019-12-06 PROCEDURE — 82962 GLUCOSE BLOOD TEST: CPT

## 2019-12-06 PROCEDURE — 36415 COLL VENOUS BLD VENIPUNCTURE: CPT

## 2019-12-06 PROCEDURE — 77010033678 HC OXYGEN DAILY

## 2019-12-06 PROCEDURE — 65660000000 HC RM CCU STEPDOWN

## 2019-12-06 PROCEDURE — 74011000258 HC RX REV CODE- 258: Performed by: INTERNAL MEDICINE

## 2019-12-06 PROCEDURE — 71046 X-RAY EXAM CHEST 2 VIEWS: CPT

## 2019-12-06 RX ORDER — BUDESONIDE 0.5 MG/2ML
500 INHALANT ORAL
Status: DISCONTINUED | OUTPATIENT
Start: 2019-12-06 | End: 2019-12-08 | Stop reason: HOSPADM

## 2019-12-06 RX ORDER — FUROSEMIDE 20 MG/1
20 TABLET ORAL DAILY
Status: DISCONTINUED | OUTPATIENT
Start: 2019-12-07 | End: 2019-12-08 | Stop reason: HOSPADM

## 2019-12-06 RX ORDER — PREDNISONE 20 MG/1
20 TABLET ORAL 2 TIMES DAILY WITH MEALS
Status: DISCONTINUED | OUTPATIENT
Start: 2019-12-06 | End: 2019-12-06

## 2019-12-06 RX ORDER — LEVALBUTEROL INHALATION SOLUTION 1.25 MG/3ML
1.25 SOLUTION RESPIRATORY (INHALATION)
Status: DISCONTINUED | OUTPATIENT
Start: 2019-12-07 | End: 2019-12-08 | Stop reason: HOSPADM

## 2019-12-06 RX ORDER — PREDNISONE 20 MG/1
20 TABLET ORAL 2 TIMES DAILY WITH MEALS
Status: DISCONTINUED | OUTPATIENT
Start: 2019-12-06 | End: 2019-12-08 | Stop reason: HOSPADM

## 2019-12-06 RX ORDER — FLUTICASONE FUROATE AND VILANTEROL 200; 25 UG/1; UG/1
1 POWDER RESPIRATORY (INHALATION) DAILY
Status: DISCONTINUED | OUTPATIENT
Start: 2019-12-06 | End: 2019-12-08 | Stop reason: HOSPADM

## 2019-12-06 RX ORDER — LEVALBUTEROL INHALATION SOLUTION 1.25 MG/3ML
1.25 SOLUTION RESPIRATORY (INHALATION)
Status: DISCONTINUED | OUTPATIENT
Start: 2019-12-06 | End: 2019-12-08 | Stop reason: HOSPADM

## 2019-12-06 RX ADMIN — Medication 1 CAPSULE: at 08:16

## 2019-12-06 RX ADMIN — NYSTATIN 500000 UNITS: 100000 SUSPENSION ORAL at 12:44

## 2019-12-06 RX ADMIN — DULOXETINE 30 MG: 30 CAPSULE, DELAYED RELEASE ORAL at 17:15

## 2019-12-06 RX ADMIN — PREDNISONE 20 MG: 20 TABLET ORAL at 16:29

## 2019-12-06 RX ADMIN — GUAIFENESIN 600 MG: 600 TABLET, EXTENDED RELEASE ORAL at 20:19

## 2019-12-06 RX ADMIN — HEPARIN SODIUM 5000 UNITS: 5000 INJECTION INTRAVENOUS; SUBCUTANEOUS at 04:02

## 2019-12-06 RX ADMIN — NYSTATIN 500000 UNITS: 100000 SUSPENSION ORAL at 17:15

## 2019-12-06 RX ADMIN — METOPROLOL SUCCINATE 25 MG: 25 TABLET, EXTENDED RELEASE ORAL at 08:16

## 2019-12-06 RX ADMIN — ASPIRIN 81 MG 81 MG: 81 TABLET ORAL at 08:16

## 2019-12-06 RX ADMIN — INSULIN LISPRO 10 UNITS: 100 INJECTION, SOLUTION INTRAVENOUS; SUBCUTANEOUS at 11:55

## 2019-12-06 RX ADMIN — INSULIN GLARGINE 70 UNITS: 100 INJECTION, SOLUTION SUBCUTANEOUS at 08:16

## 2019-12-06 RX ADMIN — INSULIN LISPRO 12 UNITS: 100 INJECTION, SOLUTION INTRAVENOUS; SUBCUTANEOUS at 08:16

## 2019-12-06 RX ADMIN — LEVALBUTEROL HYDROCHLORIDE 1.25 MG: 1.25 SOLUTION RESPIRATORY (INHALATION) at 08:10

## 2019-12-06 RX ADMIN — DULOXETINE 30 MG: 30 CAPSULE, DELAYED RELEASE ORAL at 08:16

## 2019-12-06 RX ADMIN — Medication 10 ML: at 04:02

## 2019-12-06 RX ADMIN — Medication 10 ML: at 13:10

## 2019-12-06 RX ADMIN — AMLODIPINE BESYLATE 5 MG: 5 TABLET ORAL at 08:16

## 2019-12-06 RX ADMIN — LEVALBUTEROL HYDROCHLORIDE 1.25 MG: 1.25 SOLUTION RESPIRATORY (INHALATION) at 20:27

## 2019-12-06 RX ADMIN — INSULIN LISPRO 7 UNITS: 100 INJECTION, SOLUTION INTRAVENOUS; SUBCUTANEOUS at 08:17

## 2019-12-06 RX ADMIN — LEVALBUTEROL HYDROCHLORIDE 1.25 MG: 1.25 SOLUTION RESPIRATORY (INHALATION) at 01:17

## 2019-12-06 RX ADMIN — POTASSIUM CHLORIDE 10 MEQ: 750 TABLET, FILM COATED, EXTENDED RELEASE ORAL at 08:16

## 2019-12-06 RX ADMIN — POTASSIUM CHLORIDE 10 MEQ: 750 TABLET, FILM COATED, EXTENDED RELEASE ORAL at 17:15

## 2019-12-06 RX ADMIN — GUAIFENESIN 600 MG: 600 TABLET, EXTENDED RELEASE ORAL at 08:16

## 2019-12-06 RX ADMIN — BUDESONIDE 500 MCG: 0.5 INHALANT RESPIRATORY (INHALATION) at 20:28

## 2019-12-06 RX ADMIN — FAMOTIDINE 10 MG: 20 TABLET ORAL at 17:15

## 2019-12-06 RX ADMIN — CEFTRIAXONE SODIUM 2 G: 2 INJECTION, POWDER, FOR SOLUTION INTRAMUSCULAR; INTRAVENOUS at 16:29

## 2019-12-06 RX ADMIN — FUROSEMIDE 40 MG: 40 TABLET ORAL at 08:16

## 2019-12-06 RX ADMIN — Medication 10 ML: at 22:26

## 2019-12-06 RX ADMIN — HEPARIN SODIUM 5000 UNITS: 5000 INJECTION INTRAVENOUS; SUBCUTANEOUS at 20:19

## 2019-12-06 RX ADMIN — NYSTATIN 500000 UNITS: 100000 SUSPENSION ORAL at 08:16

## 2019-12-06 RX ADMIN — INSULIN LISPRO 10 UNITS: 100 INJECTION, SOLUTION INTRAVENOUS; SUBCUTANEOUS at 17:15

## 2019-12-06 RX ADMIN — FLUTICASONE FUROATE AND VILANTEROL TRIFENATATE 1 PUFF: 200; 25 POWDER RESPIRATORY (INHALATION) at 12:44

## 2019-12-06 RX ADMIN — HEPARIN SODIUM 5000 UNITS: 5000 INJECTION INTRAVENOUS; SUBCUTANEOUS at 11:55

## 2019-12-06 RX ADMIN — INSULIN LISPRO 12 UNITS: 100 INJECTION, SOLUTION INTRAVENOUS; SUBCUTANEOUS at 11:54

## 2019-12-06 RX ADMIN — BUDESONIDE 500 MCG: 0.5 INHALANT RESPIRATORY (INHALATION) at 08:10

## 2019-12-06 RX ADMIN — FAMOTIDINE 10 MG: 20 TABLET ORAL at 08:16

## 2019-12-06 RX ADMIN — NYSTATIN 500000 UNITS: 100000 SUSPENSION ORAL at 22:25

## 2019-12-06 NOTE — PROGRESS NOTES
Problem: Patient Education: Go to Patient Education Activity  Goal: Patient/Family Education  Outcome: Progressing Towards Goal     Problem: Pneumonia: Day 1  Goal: Off Pathway (Use only if patient is Off Pathway)  Outcome: Progressing Towards Goal  Goal: Activity/Safety  Outcome: Progressing Towards Goal  Goal: Consults, if ordered  Outcome: Progressing Towards Goal  Goal: Diagnostic Test/Procedures  Outcome: Progressing Towards Goal  Goal: Nutrition/Diet  Outcome: Progressing Towards Goal  Goal: Medications  Outcome: Progressing Towards Goal  Goal: Respiratory  Outcome: Progressing Towards Goal  Goal: Treatments/Interventions/Procedures  Outcome: Progressing Towards Goal  Goal: Psychosocial  Outcome: Progressing Towards Goal  Goal: *Oxygen saturation within defined limits  Outcome: Progressing Towards Goal  Goal: *Influenza vaccine administered (October-March)  Outcome: Progressing Towards Goal  Goal: *Pneumoccocal vaccine administered  Outcome: Progressing Towards Goal  Goal: *Hemodynamically stable  Outcome: Progressing Towards Goal  Goal: *Demonstrates progressive activity  Outcome: Progressing Towards Goal  Goal: *Tolerating diet  Outcome: Progressing Towards Goal     Problem: Pneumonia: Day 2  Goal: Off Pathway (Use only if patient is Off Pathway)  Outcome: Progressing Towards Goal  Goal: Activity/Safety  Outcome: Progressing Towards Goal  Goal: Consults, if ordered  Outcome: Progressing Towards Goal  Goal: Diagnostic Test/Procedures  Outcome: Progressing Towards Goal  Goal: Nutrition/Diet  Outcome: Progressing Towards Goal  Goal: Discharge Planning  Outcome: Progressing Towards Goal  Goal: Medications  Outcome: Progressing Towards Goal  Goal: Respiratory  Outcome: Progressing Towards Goal  Goal: Treatments/Interventions/Procedures  Outcome: Progressing Towards Goal  Goal: Psychosocial  Outcome: Progressing Towards Goal  Goal: *Oxygen saturation within defined limits  Outcome: Progressing Towards Goal  Goal: *Hemodynamically stable  Outcome: Progressing Towards Goal  Goal: *Demonstrates progressive activity  Outcome: Progressing Towards Goal  Goal: *Tolerating diet  Outcome: Progressing Towards Goal  Goal: *Optimal pain control at patient's stated goal  Outcome: Progressing Towards Goal     Problem: Pneumonia: Day 3  Goal: Off Pathway (Use only if patient is Off Pathway)  Outcome: Progressing Towards Goal  Goal: Activity/Safety  Outcome: Progressing Towards Goal  Goal: Consults, if ordered  Outcome: Progressing Towards Goal  Goal: Diagnostic Test/Procedures  Outcome: Progressing Towards Goal  Goal: Nutrition/Diet  Outcome: Progressing Towards Goal  Goal: Discharge Planning  Outcome: Progressing Towards Goal  Goal: Medications  Outcome: Progressing Towards Goal  Goal: Respiratory  Outcome: Progressing Towards Goal  Goal: Treatments/Interventions/Procedures  Outcome: Progressing Towards Goal  Goal: Psychosocial  Outcome: Progressing Towards Goal  Goal: *Oxygen saturation within defined limits  Outcome: Progressing Towards Goal  Goal: *Hemodynamically stable  Outcome: Progressing Towards Goal  Goal: *Demonstrates progressive activity  Outcome: Progressing Towards Goal  Goal: *Tolerating diet  Outcome: Progressing Towards Goal  Goal: *Describes available resources and support systems  Outcome: Progressing Towards Goal  Goal: *Optimal pain control at patient's stated goal  Outcome: Progressing Towards Goal     Problem: Pneumonia: Day 4  Goal: Off Pathway (Use only if patient is Off Pathway)  Outcome: Progressing Towards Goal  Goal: Activity/Safety  Outcome: Progressing Towards Goal  Goal: Nutrition/Diet  Outcome: Progressing Towards Goal  Goal: Discharge Planning  Outcome: Progressing Towards Goal  Goal: Medications  Outcome: Progressing Towards Goal  Goal: Respiratory  Outcome: Progressing Towards Goal  Goal: Treatments/Interventions/Procedures  Outcome: Progressing Towards Goal  Goal: Psychosocial  Outcome: Progressing Towards Goal     Problem: Pneumonia: Discharge Outcomes  Goal: *Demonstrates progressive activity  Outcome: Progressing Towards Goal  Goal: *Describes follow-up/return visits to physicians  Outcome: Progressing Towards Goal  Goal: *Tolerating diet  Outcome: Progressing Towards Goal  Goal: *Verbalizes name, dosage, time, side effects, and number of days to continue medications  Outcome: Progressing Towards Goal  Goal: *Influenza immunization  Outcome: Progressing Towards Goal  Goal: *Pneumococcal immunization  Outcome: Progressing Towards Goal  Goal: *Respiratory status at baseline  Outcome: Progressing Towards Goal  Goal: *Vital signs within defined limits  Outcome: Progressing Towards Goal  Goal: *Describes available resources and support systems  Outcome: Progressing Towards Goal  Goal: *Optimal pain control at patient's stated goal  Outcome: Progressing Towards Goal     Problem: Falls - Risk of  Goal: *Absence of Falls  Description  Document Eldon Squires Fall Risk and appropriate interventions in the flowsheet.   Outcome: Progressing Towards Goal  Note: Fall Risk Interventions:  Mobility Interventions: Patient to call before getting OOB         Medication Interventions: Patient to call before getting OOB                   Problem: Patient Education: Go to Patient Education Activity  Goal: Patient/Family Education  Outcome: Progressing Towards Goal

## 2019-12-06 NOTE — PROGRESS NOTES
ADULT PROTOCOL: JET AEROSOL  REASSESSMENT    Patient  Argentina Gomez     46 y.o.   male     12/6/2019  9:28 AM    Breath Sounds Pre Procedure: Right Breath Sounds: Inspiratory wheezing                               Left Breath Sounds: Inspiratory wheezing    Breath Sounds Post Procedure: Right Breath Sounds: Inspiratory wheezing                                 Left Breath Sounds: Inspiratory wheezing    Breathing pattern: Pre procedure Breathing Pattern: Regular          Post procedure Breathing Pattern: Regular    Heart Rate: Pre procedure Pulse: 88           Post procedure Pulse: 87    Resp Rate: Pre procedure Respirations: 20           Post procedure Respirations: 20      Cough: Pre procedure Cough: Non-productive               Post procedure Cough: Non-productive        Sputum: Pre procedure Sputum amount: Moderate  Sputum color/odor: White  Sputum consistency:  Thick                 Post procedure      Oxygen: 2L/NC  Changed: NO    SpO2: Pre procedure SpO2: 93 %                 Post procedure SpO2: 93 %      Nebulizer Therapy: Current medications Aerosolized Medications: Pulmicort, Xopenex      Changed:NO        Problem List:   Patient Active Problem List   Diagnosis Code    Unspecified cerebral artery occlusion with cerebral infarction I63.50    [facility], [facility] E10.65    Essential hypertension, benign I10    Hyperlipidemia LDL goal <70 E78.5    ED (erectile dysfunction) N52.9    Vitamin D deficiency E55.9    Type 2 diabetes with nephropathy (HCC) E11.21    Severe obesity (HCC) E66.01    Bilateral pneumonia J18.9    Severe sepsis (HCC) A41.9, R65.20    GABY (acute kidney injury) ([facility] N17.9       Respiratory Therapist: Bianca Cunningham, RT

## 2019-12-06 NOTE — CONSULTS
[facility]  Pulmonary, Critical Care, and Sleep Medicine   Patient progress     Name: Tristen Armendariz MRN: [id number]   : 1970 Hospital: [facility]   Date: 2019        IMPRESSION:   · Pneumonia- ?cap or atypical or viral - no vaping . -  Urine ag suggest strep pneumonia   · Acute hypoxic resp failure - now on 2 liters    · Diabetic  · Suspected ady  · Mild uli  · Leukocytosis- better   · Bronchospasm-asthmatic bronchitis- sounds  Much better today   · Mild to moderate mr with pasp 33   · Elevated probnp   · Wheezing - he denies hx of this -  Likely related to the acute resp  Infection - better       RECOMMENDATIONS:   · Agree with the comprehensive care provided by hospitalist team.   · o2  And wean as able   · Rt / nebs - wife  Has clean neb machine at home and he will use hers with new tubing . Will need med - change to albuterol  qid and budesonide . 5mg bid at dc -  If we can start him on  Hand held like breo we can dc the budesonide at this  Point   ·  continue close monitoring  · Agree with careful diuresis in view of suspected leaky lungs - drop to lasix once a day   · Agree with viral panel, urine ag - fu ag - strep positive   · Will send hypersensitivity panel to cover bases - never sent but as better and as  We have dx will fore  · Agree with restarting  Steroid- may just try po pred and continue bls monitoring. - drop further if better in am    · Continue current ab - set stop date  Pd repeat cxr. zithromax dc today -   · Fu cxr - still pd   · Discussed possible dc plans with pt , wife and Dr. Nii Schneider . · Fu  Oximetry studies- if  There are plans for sat dc can do today      Subjective: This patient has been seen and evaluated at the request of Dr. Nii Schneider  For acute hypoxic resp failure and possible ards. . Patient is a 46 y.o. male  Who resport a cough for about 2 week. Sputum was initially brown with no heme.  He progressed on thanksgiving day with chills , increased sob and worsening fatigue. He presented  And was admitted on 11/29. Cxr has progressed from 11/29 to 12/ 2 . He required increased o2 and  Is currently at 5 liter nc. He says he feels better. He has been comprehensively managed. So far bc ng . Sick contact was his wife who returned from a trip out of state where several family members were sick and then she got sick with a colde that evolved into bronchitis. ( She was on ab and pred and inhaler. )    Pt denies smoking,  Vaping. He admits to exposure to nitric acid, hydrochloric acid , acetate and natha in his occupation as an  but says the area is well ventilated and he has been around this for years. 12/3- says he  Feels fine. His sputum is now tan and a little more productive per his report . 12/4- feeling fine again . 12/5 - feels fine   Past Medical History:   Diagnosis Date    Diabetes ([facility]     Other and unspecified hyperlipidemia     Stroke [facility])     TIA September 2011    TIA (transient ischemic attack) Sept 2011    Type I (juvenile type) diabetes mellitus without mention of complication, uncontrolled Age 8    Unspecified essential hypertension     Unspecified vitamin D deficiency 2/27/2012      Past Surgical History:   Procedure Laterality Date    HX CATARACT REMOVAL Left 03/2019      Prior to Admission medications    Medication Sig Start Date End Date Taking? Authorizing Provider   DULoxetine (CYMBALTA) 30 mg capsule Take 1 Cap by mouth two (2) times a day.  11/22/19  Yes Michael Diaz MD   hydroCHLOROthiazide (HYDRODIURIL) 25 mg tablet TAKE 1 TABLET BY MOUTH EVERY DAY 11/7/19  Yes Michael Diaz MD   lisinopril (PRINIVIL, ZESTRIL) 40 mg tablet TAKE 1 TABLET DAILY 11/2/19  Yes Michael Diaz MD   insulin aspart U-100 (NOVOLOG FLEXPEN U-100 INSULIN) 100 unit/mL (3 mL) inpn FOR DIRECTIONS ON HOW TO   TAKE THIS MEDICINE, READ   THE ENCLOSED MEDICATION    INFORMATION FORM 11/2/19  Yes Soheila Barraza MD   amLODIPine (NORVASC) 10 mg tablet TAKE 1 TABLET DAILY 11/2/19  Yes Soheila Barraza MD   naproxen (NAPROSYN) 500 mg tablet Take 500 mg by mouth as needed. 10/30/19  Yes Provider, Historical   b complex vitamins (B COMPLEX 1) tablet Take 1 Tab by mouth daily. Yes Provider, Historical   TRESIBA FLEXTOUCH U-200 200 unit/mL (3 mL) inpn Inject 100 units in the morning--dispense 18 pens for 90 day supply--Dose change 11/1/19--updated med list--did not send prescription to the pharmacy 11/1/19  Yes Soheila Barraza MD   rosuvastatin (CRESTOR) 20 mg tablet Take 1/2 tab daily--Dose change 1/17/19--updated med list--did not send prescription to the pharmacy 1/17/19  Yes Soheila Barraza MD   Insulin Needles, Disposable, (BD ULTRA-FINE MINI PEN NEEDLE) 31 gauge x 3/16\" ndle Use as directed 3 times daily 10/30/18  Yes Soheila Barraza MD   insulin syringe-needle U-100 1 mL 31 gauge x 15/64\" syrg 1 Syringe by SubCUTAneous route five (5) times daily. 7/2/18  Yes Soheila Barraza MD   aspirin 81 mg chewable tablet Take 81 mg by mouth daily. Yes Provider, Historical   ONETOUCH ULTRA TEST strip Test up to 6 times daily. Dx: 250.03 4/2/15  Yes Soheila Barraza MD   [facility]. 33 gauge misc Use as directed up to 6 times daily. Dx: 250.03 4/2/15  Yes Soheila Barraza MD   cholecalciferol, vitamin D3, (VITAMIN D3) 2,000 unit Tab Take  by mouth daily. Yes Provider, Historical   MV-MN/FA/D3/LYCOPENE/LUT/COQ10 (DAILY MULTIVITAMIN PO) Take  by mouth. Yes Provider, Historical   TRESIBA FLEXTOUCH U-200 200 unit/mL (3 mL) inpn INJECT 110  UNITS IN THE MORNING 11/2/19   Soheila Barraza MD     No Known Allergies   Social History     Tobacco Use    Smoking status: Never Smoker    Smokeless tobacco: Never Used   Substance Use Topics    Alcohol use:  Yes     Alcohol/week: 1.0 standard drinks     Types: 1 Cans of beer per week     Comment: Rare beer   , wife was present, works as an  and works with ups 3am to [employer] .   Family History   Problem Relation Age of Onset    Cancer Father         Bone cancer    Heart Attack Maternal Grandmother     Heart Attack Maternal Grandfather     Stroke Maternal Grandfather     Heart Attack Paternal Grandmother     Heart Attack Paternal Grandfather     Diabetes Other         2 nieces with Type 1 diabetes    Heart Disease Neg Hx     vaccines- did not get the flu shot this year. Has not had the pneumovax.      Current Facility-Administered Medications   Medication Dose Route Frequency    metoprolol succinate (TOPROL-XL) XL tablet 25 mg  25 mg Oral DAILY    nystatin (MYCOSTATIN) 100,000 unit/mL oral suspension 500,000 Units  500,000 Units Oral QID    furosemide (LASIX) tablet 40 mg  40 mg Oral DAILY    insulin lispro (HUMALOG) injection   SubCUTAneous AC&HS    amLODIPine (NORVASC) tablet 5 mg  5 mg Oral DAILY    budesonide (PULMICORT) 500 mcg/2 ml nebulizer suspension  1,000 mcg Nebulization BID RT    insulin glargine (LANTUS) injection 70 Units  70 Units SubCUTAneous DAILY    influenza vaccine 2019-20 (6 mos+)(PF) (FLUARIX/FLULAVAL/FLUZONE QUAD) injection 0.5 mL  0.5 mL IntraMUSCular PRIOR TO DISCHARGE    levalbuterol (XOPENEX) nebulizer soln 1.25 mg/3 mL  1.25 mg Nebulization Q6H RT    famotidine (PEPCID) tablet 10 mg  10 mg Oral BID    potassium chloride SR (KLOR-CON 10) tablet 10 mEq  10 mEq Oral BID    insulin lispro (HUMALOG) injection 12 Units  12 Units SubCUTAneous TIDAC    cefTRIAXone (ROCEPHIN) 2 g in 0.9% sodium chloride (MBP/ADV) 50 mL  2 g IntraVENous Q24H    aspirin chewable tablet 81 mg  81 mg Oral DAILY    DULoxetine (CYMBALTA) capsule 30 mg  30 mg Oral BID    sodium chloride (NS) flush 5-40 mL  5-40 mL IntraVENous Q8H    heparin (porcine) injection 5,000 Units  5,000 Units SubCUTAneous Q8H    lactobac ac& pc-s.therm-b.anim (JASON Q/RISAQUAD)  1 Cap Oral DAILY    guaiFENesin ER (MUCINEX) tablet 600 mg  600 mg Oral Q12H       Review of Systems:  A comprehensive review of systems was negative except for that written in the HPI. Objective:   Vital Signs:    Visit Vitals  /71   Pulse 87   Temp 98 °F (36.7 °C)   Resp 20   Wt 97.1 kg (214 lb)   SpO2 93%   BMI 36.73 kg/m²       O2 Device: Nasal cannula   O2 Flow Rate (L/min): 2 l/min   Temp (24hrs), Av.7 °F (36.5 °C), Min:97.4 °F (36.3 °C), Max:98 °F (36.7 °C)       Intake/Output:   Last shift:      No intake/output data recorded. Last 3 shifts: No intake/output data recorded. No intake or output data in the 24 hours ending 19 1051   Physical Exam:   General:  Alert, cooperative, no distress, appears stated age. Stocky    Head:  Normocephalic, without obvious abnormality, atraumatic. Eyes:  Conjunctivae/corneas clear. PERRL, EOMs intact. Nose: Nares normal. Septum midline. Mucosa normal. No drainage or sinus tenderness. Throat: Lips, mucosa, and tongue normal. Teeth and gums normal. M4 airway    Neck: Supple, symmetrical, trachea midline, no adenopathy, broad   Back:   Symmetric, no curvature. ROM normal.   Lungs:    Much clearer. Minimal wheeze and  Minimal basilar rales    Chest wall:  No tenderness or deformity. Heart:  Regular rate and rhythm, S1, S2 normal, no murmur, click, rub or gallop. softmurmur   Abdomen:   Soft, non-tender. Extremities: Extremities normal, atraumatic, no cyanosis .  Mild pedal edema    Pulses: Not examined    Skin: Skin color, texture, turgor normal. No rashes or lesions   Lymph nodes: Cervical, supraclavicular,  nodes normal.   Neurologic: Grossly nonfocal. Alert and oriented      Data review:     Recent Results (from the past 24 hour(s))   GLUCOSE, POC    Collection Time: 19 11:15 AM   Result Value Ref Range    Glucose (POC) 395 (H) 65 - 100 mg/dL    Performed by Danitza Finley (PCT)    GLUCOSE, POC    Collection Time: 19  4:30 PM   Result Value Ref Range    Glucose (POC) 404 (H) 65 - 100 mg/dL    Performed by Jasmyn Infante (PCT)    GLUCOSE, POC    Collection Time: 12/05/19  9:40 PM   Result Value Ref Range    Glucose (POC) 389 (H) 65 - 100 mg/dL    Performed by Evelin Albert (PCT)    CBC WITH AUTOMATED DIFF    Collection Time: 12/06/19  1:51 AM   Result Value Ref Range    WBC 16.0 (H) 4.1 - 11.1 K/uL    RBC 4.00 (L) 4.10 - 5.70 M/uL    HGB 12.4 12.1 - 17.0 g/dL    HCT 36.7 36.6 - 50.3 %    MCV 91.8 80.0 - 99.0 FL    MCH 31.0 26.0 - 34.0 PG    MCHC 33.8 30.0 - 36.5 g/dL    RDW 12.2 11.5 - 14.5 %    PLATELET 713 (H) 878 - 400 K/uL    MPV 11.0 8.9 - 12.9 FL    NRBC 0.0 0  WBC    ABSOLUTE NRBC 0.00 0.00 - 0.01 K/uL    NEUTROPHILS 77 (H) 32 - 75 %    BAND NEUTROPHILS 3 %    LYMPHOCYTES 8 (L) 12 - 49 %    MONOCYTES 10 5 - 13 %    EOSINOPHILS 0 0 - 7 %    BASOPHILS 0 0 - 1 %    METAMYELOCYTES 1 %    MYELOCYTES 1 %    IMMATURE GRANULOCYTES 0 0.0 - 0.5 %    ABS. NEUTROPHILS 12.8 (H) 1.8 - 8.0 K/UL    ABS. LYMPHOCYTES 1.3 0.8 - 3.5 K/UL    ABS. MONOCYTES 1.6 (H) 0.0 - 1.0 K/UL    ABS. EOSINOPHILS 0.0 0.0 - 0.4 K/UL    ABS. BASOPHILS 0.0 0.0 - 0.1 K/UL    ABS. IMM. GRANS. 0.0 0.00 - 0.04 K/UL    DF AUTOMATED      RBC COMMENTS NORMOCYTIC, NORMOCHROMIC     GLUCOSE, POC    Collection Time: 12/06/19  6:26 AM   Result Value Ref Range    Glucose (POC) 293 (H) 65 - 100 mg/dL    Performed by Evelin Albert (PCT)        Imaging:  I have personally reviewed the patients radiographs and have reviewed the reports:  Progression of asdz from 11/29 to 12/2 but I think cxr today looks a little better than yesterday . 12/4- cxr is better with decreased infiltrates.    cxr 12/6 still pd      Preston Garcia MD

## 2019-12-06 NOTE — PROGRESS NOTES
Hospitalist Progress Note    NAME: Bogdan Salgado   :  1970   MRN:  [id number]       Assessment / Plan:  Acute Hypoxic Respiratory Failure POA, improving 2/2 PNA and ARDS  -B/L PNA POA  Strep Pneumonia  -Leucocytosis, improving  -Initially admitted with PNA, developed ARDS  -Continue Abx   --On p.o. steroids  Decreased lasix  -Appreciate pulmonary help  -continue Nebs  -wean O2 as tolerated    -Respiratory PCR negative for viral panel  -      Diabetes, Type II: uncontrolled in patient with hyperglycemia, improved some  -continue lantus  Premeal insulin  SSI    GABY: resolved  -IVF's no discontinued, monitor renal function closely while diuresing    Obesity: BMI 36.73     Code Status: Full code  Surrogate Decision Maker: Wife Dung Hatfield      DVT Prophylaxis: SQ heparin  GI Prophylaxis: not indicated     Baseline: Patient is ambulatory at baseline  Hopefully Discharge tomorrow, if we can wean down o2 by tomorrow. Subjective:     Chief Complaint / Reason for Physician Visit: follow-up pneumonia  Patient seen for follow-up    Now on 2 L NC. Doing well. Review of Systems:  Symptom Y/N Comments  Symptom Y/N Comments   Fever/Chills n   Chest Pain n    Poor Appetite n   Edema n    Cough    Abdominal Pain n    Sputum    Joint Pain     SOB/PETERSEN    Pruritis/Rash     Nausea/vomit    Tolerating PT/OT     Diarrhea    Tolerating Diet y    Constipation    Other       Could NOT obtain due to:      Objective:     VITALS:   Last 24hrs VS reviewed since prior progress note.  Most recent are:  Patient Vitals for the past 24 hrs:   Temp Pulse Resp BP SpO2   19 1104 97.3 °F (36.3 °C) 87 20 150/65 92 %   19 0812     93 %   19 0715 98 °F (36.7 °C) 87 20 150/71 94 %   19 0352 97.4 °F (36.3 °C) 96 18 172/74 93 %   19 0119     92 %   19 0014 98 °F (36.7 °C) 87 20 166/61 91 %   19 97.4 °F (36.3 °C) 100 18 163/71 92 %   19 1919     93 %   19 1551 97.9 °F (36.6 °C) 96 16 153/66 94 %     No intake or output data in the 24 hours ending 12/06/19 1310     PHYSICAL EXAM:  General: WD, WN. Alert, cooperative, no acute distress    EENT:  EOMI. Anicteric sclerae. MMM  Resp:  Coarse sounds, but better  CV:  Regular  rhythm,  No edema  GI:  Soft, Non distended, Non tender.  +Bowel sounds  Neurologic:  Alert and oriented X 3, normal speech,   Psych:   Good insight. Not anxious nor agitated  Skin:  No rashes. No jaundice    Reviewed most current lab test results and cultures  YES  Reviewed most current radiology test results   YES  Review and summation of old records today    NO  Reviewed patient's current orders and MAR    YES  PMH/SH reviewed - no change compared to H&P  ________________________________________________________________________  Care Plan discussed with:    Comments   Patient x    Family      RN x    Care Manager x    Consultant  x Dr Annette Lawrence                    x Multidiciplinary team rounds were held today with , nursing, pharmacist and clinical coordinator. Patient's plan of care was discussed; medications were reviewed and discharge planning was addressed. ________________________________________________________________________  Total NON critical care TIME:  25   Minutes    Total CRITICAL CARE TIME Spent:   Minutes non procedure based      Comments   >50% of visit spent in counseling and coordination of care     ________________________________________________________________________  Welford MD Maureen     Procedures: see electronic medical records for all procedures/Xrays and details which were not copied into this note but were reviewed prior to creation of Plan. LABS:  I reviewed today's most current labs and imaging studies.   Pertinent labs include:  Recent Labs     12/06/19  0151 12/05/19  0310 12/04/19  0442   WBC 16.0* 16.2* 17.4*   HGB 12.4 13.2 12.5   HCT 36.7 39.6 37.5   * 397 399     Recent Labs 12/05/19  0310 12/04/19  0442   * 137   K 4.4 4.0   CL 97 102   CO2 31 30   * 180*   BUN 35* 33*   CREA 1.22 1.04   CA 8.8 8.1*   MG  --  2.5*       Signed: Any Henriquez MD

## 2019-12-06 NOTE — PROGRESS NOTES
Bedside and Verbal shift change report given to [place] RN(oncoming nurse) by [facility] RN (offgoing nurse).  Report included the following information SBAR, Kardex, MAR and Recent Results.     Zone Phone:    [phone]     Significant changes during shift:    Patient continues with elevated blood glucose levels MD notified, orders to give 8 units of SSI    Patient tolerating 02 titration well.     Patient Information     Pedro Hu  46 y.o.  11/29/2019  1:32 PM by Jade Hopper MD. Colin Phillips was admitted from home  Problem List             Patient Active Problem List     Diagnosis Date Noted    Bilateral pneumonia 11/29/2019    Severe sepsis ([address] 11/29/2019    GABY (acute kidney injury) ([address] 11/29/2019    Severe obesity ([address] 10/30/2018    Type 2 diabetes with nephropathy ([address] 03/30/2018    Vitamin D deficiency 02/27/2012    ED (erectile dysfunction) 01/09/2012    Type I diabetes mellitus, uncontrolled ([address] 09/08/2011    Essential hypertension, benign 09/08/2011    Hyperlipidemia LDL goal <70 09/08/2011    Unspecified cerebral artery occlusion with cerebral infarction 09/06/2011              Past Medical History:   Diagnosis Date    Diabetes ([address]      Other and unspecified hyperlipidemia      Stroke [facility])       TIA September 2011    TIA (transient ischemic attack) Sept 2011    Type I (juvenile type) diabetes mellitus without mention of complication, uncontrolled Age 8    Unspecified essential hypertension      Unspecified vitamin D deficiency 2/27/2012            Core Measures:     CVA: No Not applicable  CHF:No Not applicable  PNA:Yes yes        Activity Status:     OOB to Chair No  Ambulated this shift Yes   Bed Rest No     Supplemental J9: (YX Applicable)     NC yes  NRB No  Venti-mask No  On 4 Liters/min        LINES AND DRAINS:     PIV     DVT prophylaxis:     DVT prophylaxis Med- Yes  DVT prophylaxis SCD or WALESKA- No      Wounds: (If Applicable)     Wounds- No     Location none     Patient Safety:     Falls Score Total Score: 1  Safety Level_______  Bed Alarm On? No  Sitter? No     Plan for upcoming shift:  Chest Xray   Safety   Continue 02 challenge      Discharge Plan: Ongoing TBD (possibly 12/6/19)     Active Consults:  None

## 2019-12-06 NOTE — PROGRESS NOTES
Problem: Pneumonia: Day 2  Goal: *Oxygen saturation within defined limits  Outcome: Progressing Towards Goal

## 2019-12-07 LAB
ANION GAP SERPL CALC-SCNC: 5 MMOL/L (ref 5–15)
BASOPHILS # BLD: 0 K/UL (ref 0–0.1)
BASOPHILS NFR BLD: 0 % (ref 0–1)
BUN SERPL-MCNC: 35 MG/DL (ref 6–20)
BUN/CREAT SERPL: 26 (ref 12–20)
CALCIUM SERPL-MCNC: 8.8 MG/DL (ref 8.5–10.1)
CHLORIDE SERPL-SCNC: 94 MMOL/L (ref 97–108)
CO2 SERPL-SCNC: 32 MMOL/L (ref 21–32)
CREAT SERPL-MCNC: 1.34 MG/DL (ref 0.7–1.3)
DIFFERENTIAL METHOD BLD: ABNORMAL
EOSINOPHIL # BLD: 0.2 K/UL (ref 0–0.4)
EOSINOPHIL NFR BLD: 1 % (ref 0–7)
ERYTHROCYTE [DISTWIDTH] IN BLOOD BY AUTOMATED COUNT: 12.3 % (ref 11.5–14.5)
GLUCOSE BLD STRIP.AUTO-MCNC: 298 MG/DL (ref 65–100)
GLUCOSE BLD STRIP.AUTO-MCNC: 315 MG/DL (ref 65–100)
GLUCOSE BLD STRIP.AUTO-MCNC: 330 MG/DL (ref 65–100)
GLUCOSE BLD STRIP.AUTO-MCNC: 418 MG/DL (ref 65–100)
GLUCOSE SERPL-MCNC: 395 MG/DL (ref 65–100)
HCT VFR BLD AUTO: 40.3 % (ref 36.6–50.3)
HGB BLD-MCNC: 13.1 G/DL (ref 12.1–17)
IMM GRANULOCYTES # BLD AUTO: 0.9 K/UL (ref 0–0.04)
IMM GRANULOCYTES NFR BLD AUTO: 6 % (ref 0–0.5)
LYMPHOCYTES # BLD: 0.9 K/UL (ref 0.8–3.5)
LYMPHOCYTES NFR BLD: 6 % (ref 12–49)
MCH RBC QN AUTO: 30.5 PG (ref 26–34)
MCHC RBC AUTO-ENTMCNC: 32.5 G/DL (ref 30–36.5)
MCV RBC AUTO: 93.9 FL (ref 80–99)
MONOCYTES # BLD: 0.6 K/UL (ref 0–1)
MONOCYTES NFR BLD: 4 % (ref 5–13)
NEUTS SEG # BLD: 13.2 K/UL (ref 1.8–8)
NEUTS SEG NFR BLD: 83 % (ref 32–75)
NRBC # BLD: 0 K/UL (ref 0–0.01)
NRBC BLD-RTO: 0 PER 100 WBC
PLATELET # BLD AUTO: 464 K/UL (ref 150–400)
PMV BLD AUTO: 11.1 FL (ref 8.9–12.9)
POTASSIUM SERPL-SCNC: 5.2 MMOL/L (ref 3.5–5.1)
RBC # BLD AUTO: 4.29 M/UL (ref 4.1–5.7)
RBC MORPH BLD: ABNORMAL
SERVICE CMNT-IMP: ABNORMAL
SODIUM SERPL-SCNC: 131 MMOL/L (ref 136–145)
WBC # BLD AUTO: 15.8 K/UL (ref 4.1–11.1)

## 2019-12-07 PROCEDURE — 74011636637 HC RX REV CODE- 636/637: Performed by: INTERNAL MEDICINE

## 2019-12-07 PROCEDURE — 36415 COLL VENOUS BLD VENIPUNCTURE: CPT

## 2019-12-07 PROCEDURE — 74011000258 HC RX REV CODE- 258: Performed by: INTERNAL MEDICINE

## 2019-12-07 PROCEDURE — 74011000250 HC RX REV CODE- 250: Performed by: INTERNAL MEDICINE

## 2019-12-07 PROCEDURE — 74011250637 HC RX REV CODE- 250/637: Performed by: INTERNAL MEDICINE

## 2019-12-07 PROCEDURE — 74011250636 HC RX REV CODE- 250/636: Performed by: INTERNAL MEDICINE

## 2019-12-07 PROCEDURE — 94640 AIRWAY INHALATION TREATMENT: CPT

## 2019-12-07 PROCEDURE — 65660000000 HC RM CCU STEPDOWN

## 2019-12-07 PROCEDURE — 80048 BASIC METABOLIC PNL TOTAL CA: CPT

## 2019-12-07 PROCEDURE — 94760 N-INVAS EAR/PLS OXIMETRY 1: CPT

## 2019-12-07 PROCEDURE — 85025 COMPLETE CBC W/AUTO DIFF WBC: CPT

## 2019-12-07 PROCEDURE — 77010033678 HC OXYGEN DAILY

## 2019-12-07 PROCEDURE — 82962 GLUCOSE BLOOD TEST: CPT

## 2019-12-07 RX ORDER — INSULIN LISPRO 100 [IU]/ML
12 INJECTION, SOLUTION INTRAVENOUS; SUBCUTANEOUS ONCE
Status: COMPLETED | OUTPATIENT
Start: 2019-12-07 | End: 2019-12-07

## 2019-12-07 RX ADMIN — INSULIN GLARGINE 70 UNITS: 100 INJECTION, SOLUTION SUBCUTANEOUS at 09:04

## 2019-12-07 RX ADMIN — GUAIFENESIN 600 MG: 600 TABLET, EXTENDED RELEASE ORAL at 09:00

## 2019-12-07 RX ADMIN — INSULIN LISPRO 10 UNITS: 100 INJECTION, SOLUTION INTRAVENOUS; SUBCUTANEOUS at 07:53

## 2019-12-07 RX ADMIN — PREDNISONE 20 MG: 20 TABLET ORAL at 09:00

## 2019-12-07 RX ADMIN — HEPARIN SODIUM 5000 UNITS: 5000 INJECTION INTRAVENOUS; SUBCUTANEOUS at 20:00

## 2019-12-07 RX ADMIN — CEFTRIAXONE SODIUM 2 G: 2 INJECTION, POWDER, FOR SOLUTION INTRAMUSCULAR; INTRAVENOUS at 15:42

## 2019-12-07 RX ADMIN — HEPARIN SODIUM 5000 UNITS: 5000 INJECTION INTRAVENOUS; SUBCUTANEOUS at 11:25

## 2019-12-07 RX ADMIN — DULOXETINE 30 MG: 30 CAPSULE, DELAYED RELEASE ORAL at 17:39

## 2019-12-07 RX ADMIN — INSULIN LISPRO 12 UNITS: 100 INJECTION, SOLUTION INTRAVENOUS; SUBCUTANEOUS at 07:53

## 2019-12-07 RX ADMIN — INSULIN LISPRO 7 UNITS: 100 INJECTION, SOLUTION INTRAVENOUS; SUBCUTANEOUS at 11:24

## 2019-12-07 RX ADMIN — FAMOTIDINE 10 MG: 20 TABLET ORAL at 09:00

## 2019-12-07 RX ADMIN — LEVALBUTEROL HYDROCHLORIDE 1.25 MG: 1.25 SOLUTION RESPIRATORY (INHALATION) at 20:52

## 2019-12-07 RX ADMIN — DULOXETINE 30 MG: 30 CAPSULE, DELAYED RELEASE ORAL at 09:00

## 2019-12-07 RX ADMIN — INSULIN LISPRO 12 UNITS: 100 INJECTION, SOLUTION INTRAVENOUS; SUBCUTANEOUS at 11:30

## 2019-12-07 RX ADMIN — INSULIN LISPRO 12 UNITS: 100 INJECTION, SOLUTION INTRAVENOUS; SUBCUTANEOUS at 17:39

## 2019-12-07 RX ADMIN — FUROSEMIDE 20 MG: 20 TABLET ORAL at 09:00

## 2019-12-07 RX ADMIN — Medication 1 CAPSULE: at 09:01

## 2019-12-07 RX ADMIN — HEPARIN SODIUM 5000 UNITS: 5000 INJECTION INTRAVENOUS; SUBCUTANEOUS at 04:10

## 2019-12-07 RX ADMIN — ASPIRIN 81 MG 81 MG: 81 TABLET ORAL at 09:00

## 2019-12-07 RX ADMIN — Medication 10 ML: at 13:38

## 2019-12-07 RX ADMIN — LEVALBUTEROL HYDROCHLORIDE 1.25 MG: 1.25 SOLUTION RESPIRATORY (INHALATION) at 14:08

## 2019-12-07 RX ADMIN — FAMOTIDINE 10 MG: 20 TABLET ORAL at 17:38

## 2019-12-07 RX ADMIN — INSULIN LISPRO 12 UNITS: 100 INJECTION, SOLUTION INTRAVENOUS; SUBCUTANEOUS at 23:00

## 2019-12-07 RX ADMIN — NYSTATIN 500000 UNITS: 100000 SUSPENSION ORAL at 09:01

## 2019-12-07 RX ADMIN — METOPROLOL SUCCINATE 25 MG: 25 TABLET, EXTENDED RELEASE ORAL at 09:00

## 2019-12-07 RX ADMIN — LEVALBUTEROL HYDROCHLORIDE 1.25 MG: 1.25 SOLUTION RESPIRATORY (INHALATION) at 08:12

## 2019-12-07 RX ADMIN — NYSTATIN 500000 UNITS: 100000 SUSPENSION ORAL at 13:37

## 2019-12-07 RX ADMIN — PREDNISONE 20 MG: 20 TABLET ORAL at 17:39

## 2019-12-07 RX ADMIN — NYSTATIN 500000 UNITS: 100000 SUSPENSION ORAL at 22:16

## 2019-12-07 RX ADMIN — Medication 10 ML: at 04:14

## 2019-12-07 RX ADMIN — INSULIN LISPRO 10 UNITS: 100 INJECTION, SOLUTION INTRAVENOUS; SUBCUTANEOUS at 17:39

## 2019-12-07 RX ADMIN — GUAIFENESIN 600 MG: 600 TABLET, EXTENDED RELEASE ORAL at 22:15

## 2019-12-07 RX ADMIN — BUDESONIDE 500 MCG: 0.5 INHALANT RESPIRATORY (INHALATION) at 08:12

## 2019-12-07 RX ADMIN — AMLODIPINE BESYLATE 5 MG: 5 TABLET ORAL at 09:00

## 2019-12-07 RX ADMIN — NYSTATIN 500000 UNITS: 100000 SUSPENSION ORAL at 17:41

## 2019-12-07 RX ADMIN — FLUTICASONE FUROATE AND VILANTEROL TRIFENATATE 1 PUFF: 200; 25 POWDER RESPIRATORY (INHALATION) at 09:02

## 2019-12-07 RX ADMIN — Medication 10 ML: at 22:16

## 2019-12-07 NOTE — ROUTINE PROCESS
Bedside and Verbal shift change report given to Agusto RN(oncoming nurse) by [facility] RN (offgoing nurse). Report included the following information SBAR, Kardex, MAR and Recent Results. 
  
Zone Phone:    [phone] 
  
Significant changes during shift:   
Patient on 2L NC for most of shift. Titrated down to 1.5 NC. O2 test to be performed Patient Information 
  
Aleshia Elizabeth 46 y.o. 
11/29/2019  1:32 PM by Grey Lin MD. Colin Phillips was admitted from home Problem List 
  
       
Patient Active Problem List  
  Diagnosis Date Noted  Bilateral pneumonia 11/29/2019  Severe sepsis ([address] 11/29/2019  GABY (acute kidney injury) ([address] 11/29/2019  Severe obesity ([address] 10/30/2018  Type 2 diabetes with nephropathy ([address] 03/30/2018  Vitamin D deficiency 02/27/2012  ED (erectile dysfunction) 01/09/2012  Type I diabetes mellitus, uncontrolled ([address] 09/08/2011  Essential hypertension, benign 09/08/2011  Hyperlipidemia LDL goal <70 09/08/2011  Unspecified cerebral artery occlusion with cerebral infarction 09/06/2011  
  
       
Past Medical History:  
Diagnosis Date  Diabetes ([address]    
 Other and unspecified hyperlipidemia    
 Stroke [facility])    
  TIA September 2011  TIA (transient ischemic attack) Sept 2011  Type I (juvenile type) diabetes mellitus without mention of complication, uncontrolled Age 8  Unspecified essential hypertension    
 Unspecified vitamin D deficiency 2/27/2012  
  
  
  
Core Measures: 
  
CVA: No Not applicable CHF:No Not applicable PNA:Yes yes 
  
  
Activity Status: 
  
OOB to [employer] Ambulated this shift Yes  
Bed Rest No 
  
Supplemental K1: (VH Applicable) 
  
NC yes NRB No 
Venti-mask No 
On 4 Liters/min 
  
  
LINES AND DRAINS: 
  
PIV 
  
DVT prophylaxis: 
  
DVT prophylaxis Med- Yes DVT prophylaxis SCD or WALESKA- No  
  
Wounds: (If Applicable) 
  
Wounds- No 
  
Location none 
  
Patient Safety: 
  
Falls Score Total Score: 1 Safety Level_______ Bed Alarm On? No 
Sitter? No 
  
Plan for upcoming shift: 
Safety Continue 02 challenge  
  
Discharge Plan: Ongoing, TBD (possibly home Monday) Active Consults: 
None

## 2019-12-07 NOTE — PROGRESS NOTES
ADULT PROTOCOL: JET AEROSOL  REASSESSMENT    Patient  Tano Young     46 y.o.   male     12/6/2019  9:23 PM    Breath Sounds Pre Procedure: Right Breath Sounds: Diminished                               Left Breath Sounds: Diminished    Breath Sounds Post Procedure: Right Breath Sounds: Diminished                                 Left Breath Sounds: Diminished    Breathing pattern: Pre procedure Breathing Pattern: Regular          Post procedure Breathing Pattern: Regular    Heart Rate: Pre procedure Pulse: 87           Post procedure Pulse: 89    Resp Rate: Pre procedure Respirations: 20           Post procedure Respirations: 18    Peak Flow: Pre bronchodilator   n/a          Post bronchodilator   n/a    Incentive Spirometry:   n/a      n/a    Cough: Pre procedure Cough: Non-productive               Post procedure Cough: Non-productive    Sputum: Pre procedure Sputum amount: Moderate  Sputum color/odor: White  Sputum consistency:  Thick                 Post procedure  none    Oxygen: O2 Device: Nasal cannula   Flow rate (L/min) 2 to 3     Changed: NO    SpO2: Pre procedure SpO2: 93 %   with oxygen              Post procedure SpO2: 93 %  with oxygen    Nebulizer Therapy: Current medications Aerosolized Medications: Pulmicort, Xopenex      Changed: YES      Problem List:   Patient Active Problem List   Diagnosis Code    Unspecified cerebral artery occlusion with cerebral infarction I63.50    Type I diabetes mellitus, uncontrolled [address] E10.65    Essential hypertension, benign I10    Hyperlipidemia LDL goal <70 E78.5    ED (erectile dysfunction) N52.9    Vitamin D deficiency E55.9    Type 2 diabetes with nephropathy (HCC) E11.21    Severe obesity (HCC) E66.01    Bilateral pneumonia J18.9    Severe sepsis (HCC) A41.9, R65.20    GABY (acute kidney injury) ([facility] [address] [facility] N17.9       Respiratory Therapist: Dorene Mcguire RT

## 2019-12-07 NOTE — PROGRESS NOTES
Problem: Patient Education: Go to Patient Education Activity  Goal: Patient/Family Education  Outcome: Progressing Towards Goal     Problem: Pneumonia: Day 1  Goal: Off Pathway (Use only if patient is Off Pathway)  Outcome: Progressing Towards Goal  Goal: Activity/Safety  Outcome: Progressing Towards Goal  Goal: Consults, if ordered  Outcome: Progressing Towards Goal  Goal: Diagnostic Test/Procedures  Outcome: Progressing Towards Goal  Goal: Nutrition/Diet  Outcome: Progressing Towards Goal  Goal: Medications  Outcome: Progressing Towards Goal  Goal: Respiratory  Outcome: Progressing Towards Goal  Goal: Treatments/Interventions/Procedures  Outcome: Progressing Towards Goal  Goal: Psychosocial  Outcome: Progressing Towards Goal  Goal: *Oxygen saturation within defined limits  Outcome: Progressing Towards Goal  Goal: *Influenza vaccine administered (October-March)  Outcome: Progressing Towards Goal  Goal: *Pneumoccocal vaccine administered  Outcome: Progressing Towards Goal  Goal: *Hemodynamically stable  Outcome: Progressing Towards Goal  Goal: *Demonstrates progressive activity  Outcome: Progressing Towards Goal  Goal: *Tolerating diet  Outcome: Progressing Towards Goal     Problem: Pneumonia: Day 2  Goal: Off Pathway (Use only if patient is Off Pathway)  Outcome: Progressing Towards Goal  Goal: Activity/Safety  Outcome: Progressing Towards Goal  Goal: Consults, if ordered  Outcome: Progressing Towards Goal  Goal: Diagnostic Test/Procedures  Outcome: Progressing Towards Goal  Goal: Nutrition/Diet  Outcome: Progressing Towards Goal  Goal: Discharge Planning  Outcome: Progressing Towards Goal  Goal: Medications  Outcome: Progressing Towards Goal  Goal: Respiratory  Outcome: Progressing Towards Goal  Goal: Treatments/Interventions/Procedures  Outcome: Progressing Towards Goal  Goal: Psychosocial  Outcome: Progressing Towards Goal  Goal: *Oxygen saturation within defined limits  Outcome: Progressing Towards Goal  Goal: *Hemodynamically stable  Outcome: Progressing Towards Goal  Goal: *Demonstrates progressive activity  Outcome: Progressing Towards Goal  Goal: *Tolerating diet  Outcome: Progressing Towards Goal  Goal: *Optimal pain control at patient's stated goal  Outcome: Progressing Towards Goal     Problem: Pneumonia: Day 4  Goal: Off Pathway (Use only if patient is Off Pathway)  Outcome: Progressing Towards Goal  Goal: Activity/Safety  Outcome: Progressing Towards Goal  Goal: Nutrition/Diet  Outcome: Progressing Towards Goal  Goal: Discharge Planning  Outcome: Progressing Towards Goal  Goal: Medications  Outcome: Progressing Towards Goal  Goal: Respiratory  Outcome: Progressing Towards Goal  Goal: Treatments/Interventions/Procedures  Outcome: Progressing Towards Goal  Goal: Psychosocial  Outcome: Progressing Towards Goal     Problem: Pneumonia: Discharge Outcomes  Goal: *Demonstrates progressive activity  Outcome: Progressing Towards Goal  Goal: *Describes follow-up/return visits to physicians  Outcome: Progressing Towards Goal  Goal: *Tolerating diet  Outcome: Progressing Towards Goal  Goal: *Verbalizes name, dosage, time, side effects, and number of days to continue medications  Outcome: Progressing Towards Goal  Goal: *Influenza immunization  Outcome: Progressing Towards Goal  Goal: *Pneumococcal immunization  Outcome: Progressing Towards Goal  Goal: *Respiratory status at baseline  Outcome: Progressing Towards Goal  Goal: *Vital signs within defined limits  Outcome: Progressing Towards Goal  Goal: *Describes available resources and support systems  Outcome: Progressing Towards Goal  Goal: *Optimal pain control at patient's stated goal  Outcome: Progressing Towards Goal     Problem: Falls - Risk of  Goal: *Absence of Falls  Description  Document Donata Carrel Fall Risk and appropriate interventions in the flowsheet.   Outcome: Progressing Towards Goal  Note: Fall Risk Interventions:  Mobility Interventions: Patient to call before getting OOB    Mentation Interventions: Adequate sleep, hydration, pain control, Bed/chair exit alarm, Door open when patient unattended, Familiar objects from home, Family/sitter at bedside, Eyeglasses and hearing aids, Reorient patient, More frequent rounding    Medication Interventions: Patient to call before getting OOB, Teach patient to arise slowly         History of Falls Interventions: Bed/chair exit alarm         Problem: Patient Education: Go to Patient Education Activity  Goal: Patient/Family Education  Outcome: Progressing Towards Goal

## 2019-12-07 NOTE — PROGRESS NOTES
Bedside and Verbal shift change report given to [facility] RN(oncoming nurse) by [facility] RN (offgoing nurse).  Report included the following information SBAR, Kardex, MAR and Recent Results.     Zone Phone:    [phone]     Significant changes during shift:    Patient on 3L of 02 during the night, tolerated well.    Patient Information     Tano Young  46 y.o.  11/29/2019  1:32 PM by Mishel Ortega MD. Colin Phillips was admitted from home  Problem List             Patient Active Problem List     Diagnosis Date Noted    Bilateral pneumonia 11/29/2019    Severe sepsis ([address] 11/29/2019    GABY (acute kidney injury) ([address] 11/29/2019    Severe obesity ([address] 10/30/2018    Type 2 diabetes with nephropathy ([address] 03/30/2018    Vitamin D deficiency 02/27/2012    ED (erectile dysfunction) 01/09/2012    Type I diabetes mellitus, uncontrolled ([address] 09/08/2011    Essential hypertension, benign 09/08/2011    Hyperlipidemia LDL goal <70 09/08/2011    Unspecified cerebral artery occlusion with cerebral infarction 09/06/2011              Past Medical History:   Diagnosis Date    Diabetes ([address]      Other and unspecified hyperlipidemia      Stroke [facility])       TIA September 2011    TIA (transient ischemic attack) Sept 2011    Type I (juvenile type) diabetes mellitus without mention of complication, uncontrolled Age 8    Unspecified essential hypertension      Unspecified vitamin D deficiency 2/27/2012            Core Measures:     CVA: No Not applicable  CHF:No Not applicable  PNA:Yes yes        Activity Status:     OOB to Chair No  Ambulated this shift Yes   Bed Rest No     Supplemental T8: (KR Applicable)     NC yes  NRB No  Venti-mask No  On 4 Liters/min        LINES AND DRAINS:     PIV     DVT prophylaxis:     DVT prophylaxis Med- Yes  DVT prophylaxis SCD or WALESKA- No      Wounds: (If Applicable)     Wounds- No     Location none     Patient Safety:     Falls Score Total Score: 1  Safety Level_______  Bed Alarm On? No  Sitter? No     Plan for upcoming shift:  Safety   Continue 02 challenge      Discharge Plan: Ongoing, TBD (possibly home Monday)   Active Consults:  None

## 2019-12-07 NOTE — PROGRESS NOTES
Hospitalist Progress Note    NAME: David Postal   :  1970   MRN:  [id number]       Assessment / Plan:  Acute Hypoxic Respiratory Failure POA, improving 2/2 PNA and ARDS  -B/L PNA POA  Strep Pneumonia  -Leucocytosis, improving  -Initially admitted with PNA, developed ARDS  -Continue Abx   --On p.o. steroids  Lasix 20 daily  -Appreciate pulmonary help  -continue Nebs  -wean O2 as tolerated    -Respiratory PCR negative for viral panel  -      Diabetes, Type II: uncontrolled in patient with hyperglycemia, improved some  -continue lantus  Premeal insulin  SSI    GABY: resolved  -IVF's no discontinued, monitor renal function closely while diuresing    Obesity: BMI 36.73     Code Status: Full code  Surrogate Decision Maker: Wife oJdee      DVT Prophylaxis: SQ heparin  GI Prophylaxis: not indicated     Baseline: Patient is ambulatory at baseline    Continues to require o2 today. Hopefully we can wean down tomorrow. Difficult to set up home o2 without chronic diagnosis       Subjective:     Chief Complaint / Reason for Physician Visit: follow-up pneumonia  Patient seen for follow-up    Now on 1.5 L NC. Improving  Attempt to wean down to room air failed yesterday        Review of Systems:  Symptom Y/N Comments  Symptom Y/N Comments   Fever/Chills n   Chest Pain n    Poor Appetite n   Edema n    Cough    Abdominal Pain n    Sputum    Joint Pain     SOB/PETERSEN    Pruritis/Rash     Nausea/vomit    Tolerating PT/OT     Diarrhea    Tolerating Diet y    Constipation    Other       Could NOT obtain due to:      Objective:     VITALS:   Last 24hrs VS reviewed since prior progress note.  Most recent are:  Patient Vitals for the past 24 hrs:   Temp Pulse Resp BP SpO2   19 1116 97.4 °F (36.3 °C) 88  121/63 93 %   19 0815     95 %   19 0808 97.3 °F (36.3 °C) 82 18 141/69 96 %   19 0403 97.5 °F (36.4 °C) 78 18 152/81 95 %   19 0016 97.9 °F (36.6 °C) 86 20 146/75 92 %   19 2028     93 %   12/06/19 2007 98 °F (36.7 °C) 86 20 132/65 90 %   12/06/19 1602     90 %   12/06/19 1600     (!) 85 %   12/06/19 1525 97.5 °F (36.4 °C) 86 20 124/64 92 %     No intake or output data in the 24 hours ending 12/07/19 1352     PHYSICAL EXAM:  General: WD, WN. Alert, cooperative, no acute distress    EENT:  EOMI. Anicteric sclerae. MMM  Resp:  Mild wheezing , improved  CV:  Regular  rhythm,  No edema  GI:  Soft, Non distended, Non tender.  +Bowel sounds  Neurologic:  Alert and oriented X 3, normal speech,   Psych:   Good insight. Not anxious nor agitated  Skin:  No rashes. No jaundice    Reviewed most current lab test results and cultures  YES  Reviewed most current radiology test results   YES  Review and summation of old records today    NO  Reviewed patient's current orders and MAR    YES  PMH/SH reviewed - no change compared to H&P  ________________________________________________________________________  Care Plan discussed with:    Comments   Patient x    Family      RN x    Care Manager x    Consultant  x                     x Multidiciplinary team rounds were held today with , nursing, pharmacist and clinical coordinator. Patient's plan of care was discussed; medications were reviewed and discharge planning was addressed. ________________________________________________________________________  Total NON critical care TIME:  21   Minutes    Total CRITICAL CARE TIME Spent:   Minutes non procedure based      Comments   >50% of visit spent in counseling and coordination of care     ________________________________________________________________________  Saskia Stone MD     Procedures: see electronic medical records for all procedures/Xrays and details which were not copied into this note but were reviewed prior to creation of Plan. LABS:  I reviewed today's most current labs and imaging studies.   Pertinent labs include:  Recent Labs     12/07/19  0405 12/06/19  0151 12/05/19 0310   WBC 15.8* 16.0* 16.2*   HGB 13.1 12.4 13.2   HCT 40.3 36.7 39.6   * 420* 397     Recent Labs     12/07/19  0407 12/05/19 0310   * 133*   K 5.2* 4.4   CL 94* 97   CO2 32 31   * 300*   BUN 35* 35*   CREA 1.34* 1.22   CA 8.8 8.8       Signed: Georgeann Schlatter, MD

## 2019-12-08 VITALS
SYSTOLIC BLOOD PRESSURE: 127 MMHG | BODY MASS INDEX: 36.73 KG/M2 | TEMPERATURE: 97.8 F | DIASTOLIC BLOOD PRESSURE: 69 MMHG | HEART RATE: 86 BPM | OXYGEN SATURATION: 94 % | WEIGHT: 214 LBS | RESPIRATION RATE: 16 BRPM

## 2019-12-08 LAB
ANION GAP SERPL CALC-SCNC: 3 MMOL/L (ref 5–15)
BASOPHILS # BLD: 0 K/UL (ref 0–0.1)
BASOPHILS NFR BLD: 0 % (ref 0–1)
BUN SERPL-MCNC: 38 MG/DL (ref 6–20)
BUN/CREAT SERPL: 29 (ref 12–20)
CALCIUM SERPL-MCNC: 9.2 MG/DL (ref 8.5–10.1)
CHLORIDE SERPL-SCNC: 99 MMOL/L (ref 97–108)
CO2 SERPL-SCNC: 32 MMOL/L (ref 21–32)
CREAT SERPL-MCNC: 1.32 MG/DL (ref 0.7–1.3)
DIFFERENTIAL METHOD BLD: ABNORMAL
EOSINOPHIL # BLD: 0.2 K/UL (ref 0–0.4)
EOSINOPHIL NFR BLD: 1 % (ref 0–7)
ERYTHROCYTE [DISTWIDTH] IN BLOOD BY AUTOMATED COUNT: 12.1 % (ref 11.5–14.5)
GLUCOSE BLD STRIP.AUTO-MCNC: 240 MG/DL (ref 65–100)
GLUCOSE BLD STRIP.AUTO-MCNC: 272 MG/DL (ref 65–100)
GLUCOSE BLD STRIP.AUTO-MCNC: 484 MG/DL (ref 65–100)
GLUCOSE SERPL-MCNC: 370 MG/DL (ref 65–100)
HCT VFR BLD AUTO: 38.5 % (ref 36.6–50.3)
HGB BLD-MCNC: 12.8 G/DL (ref 12.1–17)
IMM GRANULOCYTES # BLD AUTO: 0.7 K/UL (ref 0–0.04)
IMM GRANULOCYTES NFR BLD AUTO: 4 % (ref 0–0.5)
LYMPHOCYTES # BLD: 0.9 K/UL (ref 0.8–3.5)
LYMPHOCYTES NFR BLD: 5 % (ref 12–49)
MCH RBC QN AUTO: 30.8 PG (ref 26–34)
MCHC RBC AUTO-ENTMCNC: 33.2 G/DL (ref 30–36.5)
MCV RBC AUTO: 92.8 FL (ref 80–99)
MONOCYTES # BLD: 0.8 K/UL (ref 0–1)
MONOCYTES NFR BLD: 5 % (ref 5–13)
NEUTS SEG # BLD: 13.9 K/UL (ref 1.8–8)
NEUTS SEG NFR BLD: 84 % (ref 32–75)
NRBC # BLD: 0 K/UL (ref 0–0.01)
NRBC BLD-RTO: 0 PER 100 WBC
PLATELET # BLD AUTO: 472 K/UL (ref 150–400)
PMV BLD AUTO: 11 FL (ref 8.9–12.9)
POTASSIUM SERPL-SCNC: 4.5 MMOL/L (ref 3.5–5.1)
RBC # BLD AUTO: 4.15 M/UL (ref 4.1–5.7)
SERVICE CMNT-IMP: ABNORMAL
SODIUM SERPL-SCNC: 134 MMOL/L (ref 136–145)
WBC # BLD AUTO: 16.5 K/UL (ref 4.1–11.1)

## 2019-12-08 PROCEDURE — 77010033678 HC OXYGEN DAILY

## 2019-12-08 PROCEDURE — 36415 COLL VENOUS BLD VENIPUNCTURE: CPT

## 2019-12-08 PROCEDURE — 74011000250 HC RX REV CODE- 250: Performed by: INTERNAL MEDICINE

## 2019-12-08 PROCEDURE — 74011636637 HC RX REV CODE- 636/637: Performed by: INTERNAL MEDICINE

## 2019-12-08 PROCEDURE — 74011250637 HC RX REV CODE- 250/637: Performed by: INTERNAL MEDICINE

## 2019-12-08 PROCEDURE — 74011250636 HC RX REV CODE- 250/636: Performed by: INTERNAL MEDICINE

## 2019-12-08 PROCEDURE — 85025 COMPLETE CBC W/AUTO DIFF WBC: CPT

## 2019-12-08 PROCEDURE — 94640 AIRWAY INHALATION TREATMENT: CPT

## 2019-12-08 PROCEDURE — 90471 IMMUNIZATION ADMIN: CPT

## 2019-12-08 PROCEDURE — 80048 BASIC METABOLIC PNL TOTAL CA: CPT

## 2019-12-08 PROCEDURE — 74011636637 HC RX REV CODE- 636/637: Performed by: HOSPITALIST

## 2019-12-08 PROCEDURE — 90686 IIV4 VACC NO PRSV 0.5 ML IM: CPT | Performed by: INTERNAL MEDICINE

## 2019-12-08 PROCEDURE — 82962 GLUCOSE BLOOD TEST: CPT

## 2019-12-08 PROCEDURE — 94760 N-INVAS EAR/PLS OXIMETRY 1: CPT

## 2019-12-08 RX ORDER — PREDNISONE 20 MG/1
TABLET ORAL
Qty: 8 TAB | Refills: 0 | Status: SHIPPED | OUTPATIENT
Start: 2019-12-08 | End: 2020-05-08

## 2019-12-08 RX ORDER — INSULIN GLARGINE 100 [IU]/ML
20 INJECTION, SOLUTION SUBCUTANEOUS ONCE
Status: COMPLETED | OUTPATIENT
Start: 2019-12-08 | End: 2019-12-08

## 2019-12-08 RX ORDER — GUAIFENESIN/DEXTROMETHORPHAN 100-10MG/5
10 SYRUP ORAL
Qty: 1 BOTTLE | Refills: 1 | Status: SHIPPED | OUTPATIENT
Start: 2019-12-08 | End: 2019-12-18

## 2019-12-08 RX ORDER — INSULIN GLARGINE 100 [IU]/ML
45 INJECTION, SOLUTION SUBCUTANEOUS EVERY 12 HOURS
Status: DISCONTINUED | OUTPATIENT
Start: 2019-12-08 | End: 2019-12-08 | Stop reason: HOSPADM

## 2019-12-08 RX ORDER — INSULIN LISPRO 100 [IU]/ML
20 INJECTION, SOLUTION INTRAVENOUS; SUBCUTANEOUS ONCE
Status: COMPLETED | OUTPATIENT
Start: 2019-12-08 | End: 2019-12-08

## 2019-12-08 RX ORDER — LISINOPRIL 40 MG/1
TABLET ORAL
Qty: 15 TAB | Refills: 0 | Status: SHIPPED
Start: 2019-12-08 | End: 2020-07-17

## 2019-12-08 RX ORDER — METOPROLOL SUCCINATE 25 MG/1
25 TABLET, EXTENDED RELEASE ORAL DAILY
Qty: 30 TAB | Refills: 1 | Status: SHIPPED | OUTPATIENT
Start: 2019-12-09 | End: 2021-11-29

## 2019-12-08 RX ORDER — NYSTATIN 100000 [USP'U]/ML
500000 SUSPENSION ORAL 4 TIMES DAILY
Qty: 60 ML | Refills: 0 | Status: SHIPPED | OUTPATIENT
Start: 2019-12-08 | End: 2019-12-11

## 2019-12-08 RX ORDER — BUDESONIDE 0.5 MG/2ML
500 INHALANT ORAL 2 TIMES DAILY
Qty: 60 EACH | Refills: 0 | Status: SHIPPED | OUTPATIENT
Start: 2019-12-08 | End: 2020-01-07

## 2019-12-08 RX ORDER — AMLODIPINE BESYLATE 5 MG/1
TABLET ORAL
Qty: 30 TAB | Refills: 0 | Status: SHIPPED | OUTPATIENT
Start: 2019-12-08 | End: 2020-07-17 | Stop reason: DRUGHIGH

## 2019-12-08 RX ORDER — CEFDINIR 300 MG/1
300 CAPSULE ORAL 2 TIMES DAILY
Qty: 6 CAP | Refills: 0 | Status: SHIPPED | OUTPATIENT
Start: 2019-12-08 | End: 2020-05-08

## 2019-12-08 RX ORDER — ALBUTEROL SULFATE 0.63 MG/3ML
0.63 SOLUTION RESPIRATORY (INHALATION)
Qty: 3 ML | Refills: 2 | Status: SHIPPED | OUTPATIENT
Start: 2019-12-08 | End: 2019-12-18

## 2019-12-08 RX ADMIN — INSULIN LISPRO 12 UNITS: 100 INJECTION, SOLUTION INTRAVENOUS; SUBCUTANEOUS at 12:19

## 2019-12-08 RX ADMIN — INFLUENZA VIRUS VACCINE 0.5 ML: 15; 15; 15; 15 SUSPENSION INTRAMUSCULAR at 12:39

## 2019-12-08 RX ADMIN — FAMOTIDINE 10 MG: 20 TABLET ORAL at 08:07

## 2019-12-08 RX ADMIN — AMLODIPINE BESYLATE 5 MG: 5 TABLET ORAL at 08:06

## 2019-12-08 RX ADMIN — NYSTATIN 500000 UNITS: 100000 SUSPENSION ORAL at 12:39

## 2019-12-08 RX ADMIN — INSULIN LISPRO 20 UNITS: 100 INJECTION, SOLUTION INTRAVENOUS; SUBCUTANEOUS at 02:16

## 2019-12-08 RX ADMIN — INSULIN LISPRO 12 UNITS: 100 INJECTION, SOLUTION INTRAVENOUS; SUBCUTANEOUS at 08:06

## 2019-12-08 RX ADMIN — METOPROLOL SUCCINATE 25 MG: 25 TABLET, EXTENDED RELEASE ORAL at 08:06

## 2019-12-08 RX ADMIN — INSULIN GLARGINE 20 UNITS: 100 INJECTION, SOLUTION SUBCUTANEOUS at 02:16

## 2019-12-08 RX ADMIN — FLUTICASONE FUROATE AND VILANTEROL TRIFENATATE 1 PUFF: 200; 25 POWDER RESPIRATORY (INHALATION) at 08:07

## 2019-12-08 RX ADMIN — NYSTATIN 500000 UNITS: 100000 SUSPENSION ORAL at 08:06

## 2019-12-08 RX ADMIN — PREDNISONE 20 MG: 20 TABLET ORAL at 08:06

## 2019-12-08 RX ADMIN — GUAIFENESIN 600 MG: 600 TABLET, EXTENDED RELEASE ORAL at 08:06

## 2019-12-08 RX ADMIN — INSULIN LISPRO 4 UNITS: 100 INJECTION, SOLUTION INTRAVENOUS; SUBCUTANEOUS at 12:19

## 2019-12-08 RX ADMIN — DULOXETINE 30 MG: 30 CAPSULE, DELAYED RELEASE ORAL at 08:06

## 2019-12-08 RX ADMIN — LEVALBUTEROL HYDROCHLORIDE 1.25 MG: 1.25 SOLUTION RESPIRATORY (INHALATION) at 08:03

## 2019-12-08 RX ADMIN — HEPARIN SODIUM 5000 UNITS: 5000 INJECTION INTRAVENOUS; SUBCUTANEOUS at 04:47

## 2019-12-08 RX ADMIN — ASPIRIN 81 MG 81 MG: 81 TABLET ORAL at 08:06

## 2019-12-08 RX ADMIN — Medication 1 CAPSULE: at 08:07

## 2019-12-08 RX ADMIN — FUROSEMIDE 20 MG: 20 TABLET ORAL at 08:06

## 2019-12-08 RX ADMIN — INSULIN LISPRO 7 UNITS: 100 INJECTION, SOLUTION INTRAVENOUS; SUBCUTANEOUS at 08:05

## 2019-12-08 RX ADMIN — INSULIN GLARGINE 45 UNITS: 100 INJECTION, SOLUTION SUBCUTANEOUS at 09:24

## 2019-12-08 RX ADMIN — BUDESONIDE 500 MCG: 0.5 INHALANT RESPIRATORY (INHALATION) at 08:03

## 2019-12-08 NOTE — PROGRESS NOTES
Problem: Pneumonia: Discharge Outcomes  Goal: *Demonstrates progressive activity  Outcome: Progressing Towards Goal  Goal: *Describes follow-up/return visits to physicians  Outcome: Progressing Towards Goal  Goal: *Tolerating diet  Outcome: Progressing Towards Goal  Goal: *Verbalizes name, dosage, time, side effects, and number of days to continue medications  Outcome: Progressing Towards Goal  Goal: *Influenza immunization  Outcome: Progressing Towards Goal  Goal: *Pneumococcal immunization  Outcome: Progressing Towards Goal  Goal: *Respiratory status at baseline  Outcome: Progressing Towards Goal  Goal: *Vital signs within defined limits  Outcome: Progressing Towards Goal  Goal: *Describes available resources and support systems  Outcome: Progressing Towards Goal  Goal: *Optimal pain control at patient's stated goal  Outcome: Progressing Towards Goal     Problem: Falls - Risk of  Goal: *Absence of Falls  Description  Document [place] Fall Risk and appropriate interventions in the flowsheet.   Outcome: Progressing Towards Goal  Note: Fall Risk Interventions:  Mobility Interventions: Patient to call before getting OOB    Mentation Interventions: Bed/chair exit alarm    Medication Interventions: Evaluate medications/consider consulting pharmacy, Patient to call before getting OOB, Teach patient to arise slowly         History of Falls Interventions: Bed/chair exit alarm         Problem: Patient Education: Go to Patient Education Activity  Goal: Patient/Family Education  Outcome: Progressing Towards Goal

## 2019-12-08 NOTE — ROUTINE PROCESS
Bedside and Verbal shift change report Summer Allison, RN(oncoming nurse) by Aneta Rao RN (offgoing nurse). Report included the following information SBAR, Kardex, MAR and Recent Results. 
  
Zone Phone:    [phone] 
  
Significant changes during shift:   
 -- pt given 12 units of Lispro. With recheck, . An additional 20 units Lispro as well as 20 units Lantus administered. Dr. Renzo Amaya also increase Lantus dose to 45 units BID. Patient Information 
  
Lukas Vargas 46 y.o. 
11/29/2019  1:32 PM by Diogenes Babb MD. Colin Phillips was admitted from home Problem List 
  
       
Patient Active Problem List  
  Diagnosis Date Noted  Bilateral pneumonia 11/29/2019  Severe sepsis ([address] 11/29/2019  GABY (acute kidney injury) ([address] 11/29/2019  Severe obesity ([address] 10/30/2018  Type 2 diabetes with nephropathy ([address] 03/30/2018  Vitamin D deficiency 02/27/2012  ED (erectile dysfunction) 01/09/2012  Type I diabetes mellitus, uncontrolled ([address] 09/08/2011  Essential hypertension, benign 09/08/2011  Hyperlipidemia LDL goal <70 09/08/2011  Unspecified cerebral artery occlusion with cerebral infarction 09/06/2011  
  
       
Past Medical History:  
Diagnosis Date  Diabetes ([address]    
 Other and unspecified hyperlipidemia    
 Stroke [facility])    
  TIA September 2011  TIA (transient ischemic attack) Sept 2011  Type I (juvenile type) diabetes mellitus without mention of complication, uncontrolled Age 8  Unspecified essential hypertension    
 Unspecified vitamin D deficiency 2/27/2012  
  
  
  
Core Measures: 
  
CVA: No Not applicable CHF:No Not applicable PNA:Yes yes 
  
  
Activity Status: 
  
OOB to Lantron[employer] Ambulated this shift Yes  
Bed Rest No 
  
Supplemental G6: (UY Applicable) 
  
NC yes NRB No 
Venti-mask No 
On 4 Liters/min 
  
  
LINES AND DRAINS: 
  
PIV 
  
DVT prophylaxis: 
  
DVT prophylaxis Med- Yes DVT prophylaxis SCD or WALESKA- No  
   
Wounds: (If Applicable) 
  
Wounds- No 
  
Location none 
  
Patient Safety: 
  
Falls Score Total Score: 1 Safety Level_______ Bed Alarm On? No 
Sitter? No 
  
Plan for upcoming shift: 
Safety Continue 02 challenge  
  
Discharge Plan: Ongoing, TBD (possibly home Monday) Active Consults: 
None

## 2019-12-08 NOTE — DISCHARGE INSTRUCTIONS
HOSPITALIST DISCHARGE INSTRUCTIONS    NAME: Bogdan Salgado   :  1970   MRN:  [id number]     Date/Time:  2019 12:12 PM    ADMIT DATE: 2019   DISCHARGE DATE: 2019     Acute Hypoxic Respiratory Failure POA, improving 2/2 PNA and ARDS  -B/L PNA POA  Strep Pneumonia  Leucocytosis,  Diabetes, Type II  GABY: resolved      · It is important that you take the medication exactly as they are prescribed. · Keep your medication in the bottles provided by the pharmacist and keep a list of the medication names, dosages, and times to be taken in your wallet. · Do not take other medications without consulting your doctor. What to do at Home    Recommended diet:  Diabetic diet    Recommended activity: Activity as tolerated      If you have questions regarding the hospital related prescriptions or hospital related issues please call [facility] at [phone]. You can always direct your questions to your primary care doctor if you are unable to reach your hospital physician; your PCP works as an extension of your hospital doctor just like your hospital doctor is an extension of your PCP for your time at the hospital [facility], [facility])    If you experience any of the following symptoms then please call your primary care physician or return to the emergency room if you cannot get hold of your doctor:    Fever, chills, nausea, vomiting, or persistent diarrhea  Worsening weakness or new problems with your speech or balance  Dark stools or visible blood in your stools  New Leg swelling or shortness of breath as these could be signs of a clot    Additional Instructions:      Bring these papers with you to your follow up appointments. The papers will help your doctors be sure to continue the care plan from the hospital.              Information obtained by :  I understand that if any problems occur once I am at home I am to contact my physician.     I understand and acknowledge receipt of the instructions indicated above.                                                                                                                                            Physician's or R.N.'s Signature                                                                  Date/Time                                                                                                                                              Patient or Representative Signature

## 2019-12-08 NOTE — PROGRESS NOTES
[place]; Home with family    CM received consult re: home O2. CM met with pt at bedside, introduced self, and explained role. Pt verbalized understanding. CM offered education re: self-pay home O2. Pt reports he is agreeable to paying privately for home health, if needed. CM spoke with attending who stated he does not believe home O2 is needed at this time. CM will continue to follow for support, discharge planning as needed.      Carolina Echols, MSW, LSW  Supervisee in Social Work  [facility]  [phone]

## 2019-12-08 NOTE — PROGRESS NOTES
Pulse oximetry assessment   92% at rest on room air (if 88% or less, skip next steps)  85% while ambulating on room air  92% at rest on 1LPM  90% while ambulating on 1LPM

## 2019-12-08 NOTE — PROGRESS NOTES
Problem: Patient Education: Go to Patient Education Activity  Goal: Patient/Family Education  Outcome: Progressing Towards Goal     Problem: Pneumonia: Day 1  Goal: Off Pathway (Use only if patient is Off Pathway)  Outcome: Progressing Towards Goal  Goal: Activity/Safety  Outcome: Progressing Towards Goal  Goal: Consults, if ordered  Outcome: Progressing Towards Goal  Goal: Diagnostic Test/Procedures  Outcome: Progressing Towards Goal  Goal: Nutrition/Diet  Outcome: Progressing Towards Goal  Goal: Medications  Outcome: Progressing Towards Goal  Goal: Respiratory  Outcome: Progressing Towards Goal  Goal: Treatments/Interventions/Procedures  Outcome: Progressing Towards Goal  Goal: Psychosocial  Outcome: Progressing Towards Goal  Goal: *Oxygen saturation within defined limits  Outcome: Progressing Towards Goal  Goal: *Influenza vaccine administered (October-March)  Outcome: Progressing Towards Goal  Goal: *Pneumoccocal vaccine administered  Outcome: Progressing Towards Goal  Goal: *Hemodynamically stable  Outcome: Progressing Towards Goal  Goal: *Demonstrates progressive activity  Outcome: Progressing Towards Goal  Goal: *Tolerating diet  Outcome: Progressing Towards Goal     Problem: Pneumonia: Day 2  Goal: Off Pathway (Use only if patient is Off Pathway)  Outcome: Progressing Towards Goal  Goal: Activity/Safety  Outcome: Progressing Towards Goal  Goal: Consults, if ordered  Outcome: Progressing Towards Goal  Goal: Diagnostic Test/Procedures  Outcome: Progressing Towards Goal  Goal: Nutrition/Diet  Outcome: Progressing Towards Goal  Goal: Discharge Planning  Outcome: Progressing Towards Goal  Goal: Medications  Outcome: Progressing Towards Goal  Goal: Respiratory  Outcome: Progressing Towards Goal  Goal: Treatments/Interventions/Procedures  Outcome: Progressing Towards Goal  Goal: Psychosocial  Outcome: Progressing Towards Goal  Goal: *Oxygen saturation within defined limits  Outcome: Progressing Towards Goal  Goal: *Hemodynamically stable  Outcome: Progressing Towards Goal  Goal: *Demonstrates progressive activity  Outcome: Progressing Towards Goal  Goal: *Tolerating diet  Outcome: Progressing Towards Goal  Goal: *Optimal pain control at patient's stated goal  Outcome: Progressing Towards Goal     Problem: Pneumonia: Day 3  Goal: Off Pathway (Use only if patient is Off Pathway)  Outcome: Progressing Towards Goal  Goal: Activity/Safety  Outcome: Progressing Towards Goal  Goal: Consults, if ordered  Outcome: Progressing Towards Goal  Goal: Diagnostic Test/Procedures  Outcome: Progressing Towards Goal  Goal: Nutrition/Diet  Outcome: Progressing Towards Goal  Goal: Discharge Planning  Outcome: Progressing Towards Goal  Goal: Medications  Outcome: Progressing Towards Goal  Goal: Respiratory  Outcome: Progressing Towards Goal  Goal: Treatments/Interventions/Procedures  Outcome: Progressing Towards Goal  Goal: Psychosocial  Outcome: Progressing Towards Goal  Goal: *Oxygen saturation within defined limits  Outcome: Progressing Towards Goal  Goal: *Hemodynamically stable  Outcome: Progressing Towards Goal  Goal: *Demonstrates progressive activity  Outcome: Progressing Towards Goal  Goal: *Tolerating diet  Outcome: Progressing Towards Goal  Goal: *Describes available resources and support systems  Outcome: Progressing Towards Goal  Goal: *Optimal pain control at patient's stated goal  Outcome: Progressing Towards Goal     Problem: Pneumonia: Day 4  Goal: Off Pathway (Use only if patient is Off Pathway)  Outcome: Progressing Towards Goal  Goal: Activity/Safety  Outcome: Progressing Towards Goal  Goal: Nutrition/Diet  Outcome: Progressing Towards Goal  Goal: Discharge Planning  Outcome: Progressing Towards Goal  Goal: Medications  Outcome: Progressing Towards Goal  Goal: Respiratory  Outcome: Progressing Towards Goal  Goal: Treatments/Interventions/Procedures  Outcome: Progressing Towards Goal  Goal: Psychosocial  Outcome: Progressing Towards Goal

## 2019-12-08 NOTE — PROGRESS NOTES
ADULT PROTOCOL: JET AEROSOL  REASSESSMENT    Patient  Tano Young     46 y.o.   male     12/7/2019  10:12 PM    Breath Sounds Pre Procedure: Right Breath Sounds: Scattered wheezing                               Left Breath Sounds: Scattered wheezing    Breath Sounds Post Procedure: Right Breath Sounds: Inspiratory wheezing                                 Left Breath Sounds: Scattered wheezing    Breathing pattern: Pre procedure Breathing Pattern: Regular          Post procedure Breathing Pattern: Regular    Heart Rate: Pre procedure Pulse: 89           Post procedure Pulse: 95    Resp Rate: Pre procedure Respirations: 20           Post procedure Respirations: 20      Cough: Pre procedure Cough: Non-productive               Post procedure Cough: Non-productive    Suctioned: NO    Sputum: Pre procedure Sputum amount: Moderate  Sputum color/odor: White  Sputum consistency:  Thick    Oxygen: O2 Device: Nasal cannula   1L     Changed: YES    SpO2: Pre procedure SpO2: 95 %   with oxygen              Post procedure SpO2: 91 %  with oxygen    Nebulizer Therapy: Current medications Aerosolized Medications: Pulmicort, Xopenex      Changed: NO    Smoking History: never    Problem List:   Patient Active Problem List   Diagnosis Code    Unspecified cerebral artery occlusion with cerebral infarction I63.50    [facility] mellitus, [facility] E10.65    Essential hypertension, benign I10    Hyperlipidemia LDL goal <70 E78.5    ED (erectile dysfunction) N52.9    Vitamin D deficiency E55.9    Type 2 diabetes with nephropathy (HCC) E11.21    Severe obesity (HCC) E66.01    Bilateral pneumonia J18.9    Severe sepsis (HCC) A41.9, R65.20    GABY (acute kidney injury) ([facility] N17.9       Respiratory Therapist: Caridad Good, RT

## 2019-12-08 NOTE — PROGRESS NOTES
I walked the patient from his room to the first nurse's station while on room air and the patient's oxygen saturation did not drop below 93%. This data was relayed to Dr Noelle Tapia via face to face.

## 2019-12-08 NOTE — DISCHARGE SUMMARY
Hospitalist Discharge Summary     Patient ID:  Fer Garnica  [id number]  46 y.o.  1970 11/29/2019    PCP on record: Divina Muñoz MD    Admit date: 11/29/2019  Discharge date and time: 12/8/2019    DISCHARGE DIAGNOSIS:    Acute Hypoxic Respiratory Failure POA, improving 2/2 PNA and ARDS  -B/L PNA POA  Strep Pneumonia  Leucocytosis,  Diabetes, Type II  GABY: resolved      CONSULTATIONS:  IP CONSULT TO CARDIOLOGY  IP CONSULT TO PULMONOLOGY    Excerpted HPI from H&P of Eliana Weinstein MD:  Blayne Barahona is a 46 y.o.  male who presents with above complaint from home with wife ambulatory. Patient presents with chief complaint of progressive worsening of cough with sputum for the past 2 weeks  History of associated low-grade fever, malaise. History of diabetes managed by endocrinology Dr.Douglas Gin Stout  History of wife been sick 2 weeks ago-recovering from respiratory illness now     Patient was found to have severe sepsis with mild GABY with chest x-ray showing bilateral pneumonia in the ER.   __________________________________________  DISCHARGE SUMMARY/HOSPITAL COURSE:  for full details see H&P, daily progress notes, labs, consult notes. Acute Hypoxic Respiratory Failure POA, improving 2/2 PNA and ARDS  -B/L PNA POA  Strep Pneumonia  -Leucocytosis, improving  -Initially admitted with PNA,   Developed then course complicated with b/l opacities, initially though to be Heart failure causing it, was started on Diuretics, discussed with Pulmonary, it was ARDS, was restarted on steroids, and continued on lasix. he was wheezing signicantly until 12/07, but on day of discharge, it had improved, he also required O2 until last day. He had o2 challenge on day of Discharge.  He maintained o2 to 93% on room air on ambulation    -he was given PO steroids, Nebs     -Respiratory PCR negative for viral panel  -        Diabetes, Type II: uncontrolled in patient with hyperglycemia, improved some  -continue lantus, changed to His home dose  Premeal insulin  SSI     GABY: resolved         _______________________________________________________________________  Patient seen and examined by me on discharge day. Pertinent Findings:  Gen:    Not in distress  Chest: minimal inspiratory wheezing  CVS:   Regular rhythm. No edema  Abd:  Soft, not distended, not tender  Neuro:  Alert, oreinted x 3  _______________________________________________________________________  DISCHARGE MEDICATIONS:   Current Discharge Medication List      START taking these medications    Details   predniSONE (DELTASONE) 20 mg tablet Take 2 tablets twice daily for next 2 days, then take 1 tablet daily for next 2 days, then take 1/2 tablet daily for next 2 days, then stop  Qty: 8 Tab, Refills: 0      nystatin (MYCOSTATIN) 100,000 unit/mL suspension Take 5 mL by mouth four (4) times daily for 3 days. swish and spit  Qty: 60 mL, Refills: 0      albuterol (ACCUNEB) 0.63 mg/3 mL nebulizer solution 3 mL by Nebulization route every six (6) hours as needed for Wheezing for up to 10 days. Qty: 3 mL, Refills: 2      budesonide (PULMICORT) 0.5 mg/2 mL nbsp 2 mL by Nebulization route two (2) times a day for 30 days. Qty: 60 Each, Refills: 0      metoprolol succinate (TOPROL-XL) 25 mg XL tablet Take 1 Tab by mouth daily. Qty: 30 Tab, Refills: 1      guaiFENesin-dextromethorphan (ROBITUSSIN DM) 100-10 mg/5 mL syrup Take 10 mL by mouth every six (6) hours as needed for Cough for up to 10 days. Qty: 1 Bottle, Refills: 1      cefdinir (OMNICEF) 300 mg capsule Take 1 Cap by mouth two (2) times a day.   Qty: 6 Cap, Refills: 0         CONTINUE these medications which have CHANGED    Details   amLODIPine (NORVASC) 5 mg tablet TAKE 1 TABLET DAILY  Qty: 30 Tab, Refills: 0      lisinopril (PRINIVIL, ZESTRIL) 40 mg tablet Take 1/2 tablet daily  Qty: 15 Tab, Refills: 0         CONTINUE these medications which have NOT CHANGED    Details DULoxetine (CYMBALTA) 30 mg capsule Take 1 Cap by mouth two (2) times a day. Qty: 180 Cap, Refills: 3      hydroCHLOROthiazide (HYDRODIURIL) 25 mg tablet TAKE 1 TABLET BY MOUTH EVERY DAY  Qty: 90 Tab, Refills: 3      insulin aspart U-100 (NOVOLOG FLEXPEN U-100 INSULIN) 100 unit/mL (3 mL) inpn FOR DIRECTIONS ON HOW TO   TAKE THIS MEDICINE, READ   THE ENCLOSED MEDICATION    INFORMATION FORM  Qty: 90 mL, Refills: 3    Comments: INJECT 10 UNITS SUBCUTANEOUSLY 3 TIMES A DAY PLUS 2 UNITS FOR EVERY 50MG/DL ABOVE 150MG/DL. MAXIMUM 100 UNITS PER DAY      b complex vitamins (B COMPLEX 1) tablet Take 1 Tab by mouth daily. TRESIBA FLEXTOUCH U-200 200 unit/mL (3 mL) inpn Inject 100 units in the morning--dispense 18 pens for 90 day supply--Dose change 11/1/19--updated med list--did not send prescription to the pharmacy  Qty: 54 mL, Refills: 3      rosuvastatin (CRESTOR) 20 mg tablet Take 1/2 tab daily--Dose change 1/17/19--updated med list--did not send prescription to the pharmacy  Qty: 90 Tab, Refills: 3      Insulin Needles, Disposable, (BD ULTRA-FINE MINI PEN NEEDLE) 31 gauge x 3/16\" ndle Use as directed 3 times daily  Qty: 300 Pen Needle, Refills: 3      insulin syringe-needle U-100 1 mL 31 gauge x 15/64\" syrg 1 Syringe by SubCUTAneous route five (5) times daily. Qty: 200 Pen Needle, Refills: 11      aspirin 81 mg chewable tablet Take 81 mg by mouth daily. ONETOUCH ULTRA TEST strip Test up to 6 times daily. Dx: 250.03  Qty: 200 Strip, Refills: 11      ONE TOUCH DELICA 33 gauge misc Use as directed up to 6 times daily. Dx: 250.03  Qty: 200 Package, Refills: 11      cholecalciferol, vitamin D3, (VITAMIN D3) 2,000 unit Tab Take  by mouth daily. MV-MN/FA/D3/LYCOPENE/LUT/COQ10 (DAILY MULTIVITAMIN PO) Take  by mouth. STOP taking these medications       naproxen (NAPROSYN) 500 mg tablet Comments:   Reason for Stopping:                 Patient Follow Up Instructions:    Activity: Activity as tolerated  Diet: Diabetic Diet    Follow-up Information     Follow up With Specialties Details Why Contact Info    Evans Sagastume MD [facility] In 2 weeks  [address] [facility]/ [facility] [address], MD Pulmonary Disease In 2 weeks  [address] [facility]  [address]  [facility]  [phone]          ________________________________________________________________    Risk of deterioration: Low    Condition at Discharge:  Stable  __________________________________________________________________    Disposition  Home with family, no needs    ____________________________________________________________________    Code Status: Full Code  ___________________________________________________________________      Total time in minutes spent coordinating this discharge (includes going over instructions, follow-up, prescriptions, and preparing report for sign off to her PCP) :  35 minutes    Signed:  Sammuel Schwab, MD

## 2019-12-08 NOTE — PROGRESS NOTES
Increased dose of Lantus to 45 units twice daily  One time dose of 20 units of Lantus and 20 units of lispro to be administered now

## 2020-01-22 NOTE — ADT AUTH CERT NOTES
Pneumonia - Care Day 6 (12/4/2019) by Fabio Arvizu RN  
 
   
Review Entered Review Status 12/6/2019 14:46 Completed  
   
Criteria Review Care Day: 6 Care Date: 12/4/2019 Level of Care: Telemetry Guideline Day 2 Level Of Care (X) Floor Clinical Status   
(X) * No CO2 retention or acidosis 12/6/2019 14:46:34 EST by Mike Ramey   
  co2 31   
(X) * No requirement for mechanical ventilation 12/6/2019 14:46:34 EST by Mike Ramey   
  none noted   
(X) * Hypotension absent   
(X) * Fever absent or reduced 12/6/2019 14:46:34 EST by Mike Ramey   
  VS: 98.0, 87, 20, 166/74, 92% 5lpm NC   
( ) * No hypoxia on room air or oxygenation improved   
(X) * Mental status improved or at baseline 12/6/2019 14:46:34 EST by Mike Ramey   
  no complications noted Activity   
(X) * Increased activity 12/6/2019 14:46:34 EST by Derek Olson up ad sherly, Routes   
(X) Oral hydration, medications 12/6/2019 14:46:35 EST by Mike Ramey   
  diabetic diet,   
(X) Usual diet 12/6/2019 14:46:35 EST by Mike Ramey   
  diabetic diet, Interventions (X) Pulse oximetry 12/6/2019 14:46:35 EST by Mike Ramey   
  92% 5lpm NC (X) Possible oxygen 12/6/2019 14:46:35 EST by Mike Ramey   
  92% 5lpm NC Medications (X) IV or oral antibiotics 12/6/2019 14:46:35 EST by Mike Ramey   
  rocephin 2g iv qd, Zithromax 500mg iv qd,   
* Milestone Additional Notes 12/4/19 LOC: IP Telemetry VS: 98.0, 87, 20, 166/74, 92% 5lpm NC  
ABnl labs: wbc 17.4, rbc 4.03, neutro 82, lymph 7,gluc 180, bun 33, mag 2.5, CXR - Near resolved airspace disease/edema.   
Meds: asa 81mg po qd, cymbalta 30mg po bid, Pepcid 10mg po bid, mucinex 600mg po bid, heparin 5000u sc q8h, humalog sc ssi qid, xopenex 1.25mg neb q6h, klorcon 10mEQ po bid, prednisone 30mg po bid, Norvasc 2.5mg po qd, pulmicort 1000mcg po bid, Lasix 40mg iv bid, Lasix 40mg po bid, lantus 60u sc qd, Lopressor 12.5mg po bid,   
Plan: IV abx, IV diuretics, cardiac monitoring, oximetry spot check, glucose monitoring,   
Cardiology: Cough, chills, pneumonia, leukocytosis, GABY Acute diastolic chf vs. ARDS.    
- No hx of CAD  
- Echo 2011 EF 60%, Echo 12/2019 EF 55%, mild to mod MR  
- sinus - Hypertensive heart disease with CKD - Insulin DM Dr. Rodriguez Im - Dyslipidemia - CVA in 2011  
- Obese/Snores, recommended sleep study , 2 kids, works for [employer] and as an , physical job  
   
   
            Plan:     
   
Initial presentation consistent with pneumonia with fever, chills, leukocytosis. GABY, HCTZ held and given diuretic, hydration, but then possible acute diastolic chf.  
   
Echo normal EF, mild to mod MR Outpt sleep study Check repeat CXR  
   
1. Cont ASA 2. Cont added metoprolol 12.5mg bid 3. Increase amlodipine 5mg daily, was on 10mg as outpt 4. Holding lisinopril, resume prior to discharge 5. Stop HCTZ, cont with lasix 40mg iv bid, follow bmp 6. Cont crestor  
   
Appreciate pulmonary evaluation, on steroids as well.  Improving, but still on 5L O2. IM: Assessment / Plan:  
Acute Hypoxic Respiratory Failure POA, improving 2/2 PNA and ARDS  
-B/L PNA POA  
-Leucocytosis, improving  
-Initially admitted with PNA, developed ? ARDS/Pulm edema  
-Continue Abx   
-Continues to be on 5 L NC  
--On p.o. steroids  
-Appreciate pulmonary help  
-continue Nebs  
-wean O2 as tolerated  
-Awaiting sputum culture  
-Respiratory PCR negative for viral panel  
-Hopefully will turn around soon  
   
   
Diabetes, Type II: uncontrolled in patient with hyperglycemia:   
-Episodes of Hypoglycemia initially  
-Lantus 60 Daily, continue Premeal insulin SSI  
   
GABY: resolved  
-IVF's no discontinued, monitor renal function closely while diuresing  
   
Obesity: BMI 36.73  
   
Code Status: Full code Surrogate Decision Maker: Wife Ela Mo   
   
 DVT Prophylaxis: SQ heparin GI Prophylaxis: not indicated  
   
Baseline: Patient is ambulatory at baseline  
   
   
Subjective:  
   
Chief Complaint / Reason for Physician Visit: follow-up pneumonia Patient seen for follow-up  
   
Continues to be on 5 l NC. Feels better overall. Pulm: IMPRESSION:  
· Pneumonia- ?cap or atypical or viral - no vaping . - has evolved suspect \"Leaky lungs\". - cxr today looks much better with decreased infiltrates .  So far tests back are neg -- sputum culture is pd -  Few gram positive cocci.    
· Acute hypoxic resp failure - sat 91 to 93 % on 5 liter nc. -  This has been slow to recover. He looks ok but noted to have some freire walking in room. · Diabetic · Suspected ady · Mild uli · Leukocytosis · Bronchospasm-asthmatic bronchitis · Mild to moderate mr with pasp 33 · Elevated probnp · Wheezing - he denies hx of this -  Likely related to the acute resp  Infection   
   
RECOMMENDATIONS:  
· Agree with the comprehensive care provided by hospitalist team.   
· o2   
· No need for fob at this time- not suspected to be increased risk for opportunistic infection · Amanda Hamm / Saunders Bamberger · continue close monitoring · Agree with careful diuresis in view of suspected leaky lungs · Agree with viral panel, urine ag - fu ag · Will send hypersensitivity panel to cover bases - pd   
· He says hiv status is neg but is not sure when it was checked. . By hx  Extremely low risk  So will hold on checking for now .  As cxr is better not needed. · Agree with restarting  Steroid- may just try po pred and continue bls monitoring. · Continue current ab - set stop date   
   
Subjective:  
   
This patient has been seen and evaluated at the request of Dr. Nkechi Bustamante acute hypoxic resp failure and possible ards.  . Patient is a 46 y.o. male  Who resport a cough for about 2 week.   Sputum was initially brown with no heme. He progressed on thanksgiving day with chills , increased sob and worsening fatigue. He presented  And was admitted on 11/29. Cxr has progressed from 11/29 to 12/ 2 . He required increased o2 and  Is currently at 5 liter nc. Lexusia Tashajacquie says he feels better. He has been comprehensively managed.  So far bc ng .    
Sick contact was his wife who returned from a trip out of [facility] where several family members were sick and then she got sick with a colde that evolved into bronchitis.  ( She was on ab and pred and inhaler. )   
 Pt denies smoking, Krishna Christian admits to exposure to nitric acid, hydrochloric acid , acetate and natha in his occupation as an  but says the area is well ventilated and he has been around this for years.   
   
  
   
Clinical Date 12/2/19 by Akash Paulson RN  
 
   
Review Entered Review Status 12/6/2019 14:31 In Primary  
   
Criteria Review 12/2/19 LOC: IP Telemetry VS: 97.8, 89, 20, 156/72, 93% 5lpm NC Abnl labs: wbc 27.0, rbc 3.89, h/h 12.5/35.9, na 133, co2 19, gluc 178, BUN 38, ca 7.8, NTproBNP 2063, EKG: Baseline artifact Normal sinus rhythm Nonspecific ST abnormality Meds: asa 81mg po qd, rocephin 2g iv qd, cymbalta 30mg po bid, Pepcid 10mg po bid, mucinex 600mg po bid, heparin 5000u sc q8h, humalog sc TID, klorcon 10mEq po x 1, Zithromax 500mg iv qd, Lasix 40mg iv bid, lantus 50u sc qd, humalog sc ssi q4h, solumedrol 40mg iv q6h, xopenex 0.63mg neb q6h, Lopressor 12.5mg po bid,  
Plan: IV abx, IV steroids, IV diuretics, scheduled nebs. Oxygen 5lpm NC, diabetic diet, cardiac monitoring, oximetry spot check, glucose monitoring, up ad sherly, IM: Assessment / Plan: 
Acute Pulmonary Edema: occurred overnight  
-IV lasix BID ordered -TTE 
-Cardiology consulted, discussed briefly with Dr. Lossie Oppenheim 
  
Pneumonia with Bronchitis and Bronchospasm causing Acute Respiratory Failure with Hypoxia (all POA) Severe Sepsis from above -admit CXR read as: \"Bronchitis with bilateral lower lobe pneumonia or alveolitis\" -continue on Rocephin and Azithromycin 
-await Cultures (BCx NG 3d) 
-discontinue solumedrol, suspect current wheezing more from edema 
-continue scheduled Nebs 
-wean O2 as tolerated 
  
Diabetes, Type II: uncontrolled in patient with hyperglycemia: insulin regimen modified as follows today Hypoglycemia on 11/30 because patient received mealtime humalog 16 units, then felt nauseous and went to sleep without eating 
-Lantus 100 units daily (this is the hospital equivalent for patient's home [place]) reordered 
-NPH discontinued along with solumedrol 
-Humalog decreased to 12 units [facility]; patient educated that if he is nauseous and maybe not going to eat that he should refuse this medicaiton 
-Sliding scale (resistant coverage); q4h glucose checks ordered; [facility]  
  
GABY: resolved 
-IVF's no discontinued, monitor renal function closely while diuresing 
  
Obesity: BMI 36.73 
  
Code Status: Full code Surrogate Decision Maker: Wife CBCDIB  
  
DVT Prophylaxis: SQ heparin GI Prophylaxis: not indicated 
  
Baseline: Patient is ambulatory at baseline 
  
Subjective: 
  
Chief Complaint / Reason for Physician Visit: follow-up pneumonia Patient seen for follow-up SOB worse overnight CXR with pulm edema Feels better after diuresis Cardiology: Assessment:   
  
Cough, chills, pneumonia, leukocytosis, GABY Acute diastolic chf 
  
- No hx of CAD 
- Echo 2011 EF 60% 
- sinus - Hypertensive heart disease with CKD - Insulin DM Dr. Jalen Lees - Dyslipidemia - CVA in 2011 
- Obese/Snores, recommended sleep study , 2 kids, works for [employer] and as an , physical job 
  
  
            Plan:    
  
Initial presentation consistent with pneumonia with fever, chills, leukocytosis. GABY, HCTZ held and given diuretic, hydration, but then likely acute diastolic chf. 
  
Check ECG Check Echo Outpt sleep study 
  
1. Cont ASA 2. Cont added metoprolol 12.5mg bid 3. Resume amlodipine 2.5mg daily, was on 10mg as outpt 4. Holding lisinopril, resume prior to discharge 5. Stop fluids 6. Stop HCTZ, Agree with lasix 40mg iv bid, follow bmp, discharge on lower dose depending on EF 7. Cont crestor 
  
Discussed with patient and wife. 
   
   
Clinical Date 12/1/19 by Abdiaziz Del Valle RN  
 
   
Review Entered Review Status 12/6/2019 14:23 In Primary  
   
Criteria Review 12/1/19 LOC: IP Telemetry VS: 98.3, 90, 24, 119/75, 89% 4lpm NC 
ABnl labs: wbc 17.6, rbc 3.55, h/h 11.2/33.6, neutro 97, lymph 2, gluc 185, bun 35, ca 8.0, mag 2.5, alb 2.4, glob 4.3, AST 40,  
ABG: pO2 57, sO2 89 on 5lpm NC Meds: asa 81mg po qd, rocephin 2g iv qd, cymbalta 30mg po bid, mucinex 600mg po bid, heparin 5000u sc q8h, Lasix 40mg iv x 1, NS 75ml/hr iv cont, Zithromax 500mg iv qd, lantus 50u sc qd, humalog sc ssi q4h, solumedrol 40mg iv q6h, xopenex 0.63mng neb q6h, Lopressor 12.5mg po bid, IM: Assessment / Plan: 
Pneumonia with Bronchitis and Bronchospasm causing Acute Respiratory Failure with Hypoxia (all POA) Severe Sepsis from above 
-admit CXR read as: \"Bronchitis with bilateral lower lobe pneumonia or alveolitis\" -continue on Rocephin and Azithromycin 
-await Cultures (BCx NG 2d) 
-continue solumedrol 
-scheduled Nebs 
-wean O2 as tolerated 
  
Diabetes, Type II: uncontrolled in patient with hyperglycemia: insulin regimen modified as follows today Hypoglycemia on 11/30 because patient received mealtime humalog 16 units, then felt nauseous and went to sleep without eating 
-Lantus 100 units daily (this is the hospital equivalent for patient's home [facility]) -NPH 20 units q6h linked with solumedrol, may need to up-titrate pending trends -Humalog 16 units TIDAC ordered; patient educated that if he is nauseous and maybe not going to eat that he should refuse this medicaiton 
-Sliding scale (resistant coverage); q4h glucose checks ordered  
  
GABY: resolved 
-continue IVFs, rate decreased, would stop but will continue at decreased rate while patient has notable hyperglycemia 
  
Obesity: BMI 36.73 
  
Code Status: Full code Surrogate Decision Maker: Wife KRAIE  
  
DVT Prophylaxis: SQ heparin GI Prophylaxis: not indicated 
  
Baseline: Patient is ambulatory at baseline 
  
Subjective: 
  
Chief Complaint / Reason for Physician Visit: follow-up pneumonia Patient seen for follow-up Feeling better today RT: Breath Sounds Pre Procedure: Right Breath Sounds: Coarse, Crackles Left Breath Sounds: Coarse, Crackles 
  
Breath Sounds Post Procedure: Right Breath Sounds: Coarse, Crackles Left Breath Sounds: Coarse, Crackles 
  
Breathing pattern: Pre procedure Breathing Pattern: Regular Post procedure Breathing Pattern: Regular 
  
Heart Rate: Pre procedure Pulse: 88 
                    Post procedure Pulse: 88 
  
Resp Rate: Pre procedure Respirations: 28 
                    Post procedure Respirations: 30 
  
Oxygen: O2 Device: Nasal cannula   Flow rate (L/min) 4 Changed: YES   6LPM 
  
SpO2: Pre procedure SpO2: (!) 89 %(Called for nurse to asses patient. Gave breathing treatment)   with oxygen Post procedure SpO2: 91 %(increased to 5LPM)  with oxygen 
   
   
Pneumonia - Care Day 5 (12/3/2019) by Jayy Polk RN  
 
   
Review Entered Review Status 12/5/2019 10:27 Completed  
   
Criteria Review Care Day: 5 Care Date: 12/3/2019 Level of Care: Telemetry Guideline Day 2 Level Of Care (X) Floor 12/5/2019 10:27:06 EST by Alpa Sumner Clinical Status (X) * No CO2 retention or acidosis 12/5/2019 10:27:06 EST by Sid Hoffman not measured this date   
(X) * No requirement for mechanical ventilation 12/5/2019 10:27:06 EST by Shaggy Viera   
  none noted   
(X) * Hypotension absent 12/5/2019 10:27:06 EST by Shaggy Viera   
  153/71   
(X) * Fever absent or reduced 12/5/2019 10:27:06 EST by Shaggy Viera   
  97.5   
( ) * No hypoxia on room air or oxygenation improved   
(X) * Mental status improved or at baseline 12/5/2019 10:27:06 EST by Shaggy Viera   
  no complications noted Activity   
(X) * Increased activity 12/5/2019 10:27:06 EST by Sid Hoffman up ad sherly, Routes   
(X) Oral hydration, medications 12/5/2019 10:27:06 EST by Shaggy Viera   
  asa 81mg po qd, cymbalta 30mg po bid, Pepcid 10mg po bid, mucinex 600mg po bid,klorcon 10mEq po bid, prednisone 30mg po bid, norvsc 2.5mg po qd,Lopressor 12.5mg po bid,   
(X) Usual diet 12/5/2019 10:27:06 EST by Shaggy Viera   
  diabetic diet, Interventions (X) Pulse oximetry 12/5/2019 10:27:06 EST by Shaggy Viera   
  90% 5lpm NC (X) Possible oxygen 12/5/2019 10:27:06 EST by Shaggy Viera   
  90% 5lpm NC Medications (X) IV or oral antibiotics 12/5/2019 10:27:06 EST by Shaggy Viera   
  Zithromax 500mg iv qd, rocephin 2g iv qd,   
* Milestone Additional Notes 12/3/19 LOC: IP Telemetry Vs: 97.5, 92, 20, 153/71, 90% 5lpm NC Echo Adult:  · Left Ventricle: Normal cavity size and systolic function (ejection fraction normal). Moderate concentric hypertrophy. Estimated left ventricular ejection fraction is 56 - 60%. · Mitral Valve: Mitral valve thickening. Mild to moderate mitral valve regurgitation is present. · Tricuspid Valve: Mild tricuspid valve regurgitation is present. · Aortic Valve: Aortic valve leaflet calcification present.   
Meds: heparin 5000u sc q8h, humalog sc TID, xopenex 1.25mg neb q6h, pulmicort neb bid, Lasix 40mg iv x 1, lantus 60u sc qd, Plan: scheduled nebs, IV diuretics, IV abx, cardiac monitoring, oximetry spot check, glucose monitoring, RT: Breath Sounds Pre Procedure: Right Breath Sounds: Coarse, Expiratory wheezing  
                                                   Left Breath Sounds: Coarse, Expiratory wheezing  
   
Breath Sounds Post Procedure: Right Breath Sounds: Coarse, Expiratory wheezing  
                                                     Left Breath Sounds: Coarse, Expiratory wheezing  
   
Breathing pattern: Pre procedure Breathing Pattern: Regular  
                              Post procedure Breathing Pattern: Regular Oxygen: O2 Device: Nasal cannula   5L  
   Changed: NO  
   
SpO2: Pre procedure SpO2: 96 %   with oxygen  
            Post procedure SpO2: 93 %  with oxygen Cardiology: Cough, chills, pneumonia, leukocytosis, GABY Acute diastolic chf vs. ARDS.    
- No hx of CAD  
- Echo 2011 EF 60%, Echo 12/2019 EF 55%, mild to mod MR  
- sinus - Hypertensive heart disease with CKD - Insulin DM Dr. Gin Stout - Dyslipidemia - CVA in 2011  
- Obese/Snores, recommended sleep study , 2 kids, works for [employer] and as an , physical job  
   
   
            Plan:     
   
Initial presentation consistent with pneumonia with fever, chills, leukocytosis. GABY, HCTZ held and given diuretic, hydration, but then possible acute diastolic chf.  
   
Echo normal EF, mild to mod MR Outpt sleep study Check repeat CXR  
   
1. Cont ASA 2. Cont added metoprolol 12.5mg bid 3. Cont amlodipine 2.5mg daily, was on 10mg as outpt 4. Holding lisinopril, resume prior to discharge 5. Stop HCTZ, Agree with lasix 40mg iv bid, follow bmp 6. Cont crestor  
   
No edema, still with wheezing, will check follow up CXR, not convinced this is CHF, could be component of ARDS. Discussed with hospitalist, needs daily labs, may need better access. IM: Assessment / Plan:  
Acute Hypoxic Respiratory Failure POA  
-B/L PNA POA  
-Leucocytosis, worsening  
-Initially admitted with PNA, developed ? ARDS/Pulm edema  
-Continue Abx  
-Continues to be on 5 L NC  
-was on steroids but was DCed, may need to restart  
-Consult Pulmonary, ?ARDS  
-continue Nebs  
-Leucocytosis worsening as of yesterday, clinically better today, monitor for now, check CBC tomorrow  
-wean O2 as tolerated Check Sputum culture, urine strep and legionella, procacitonin  
-Check Respi panel  
   
   
Diabetes, Type II: uncontrolled in patient with hyperglycemia:   
-Episodes of Hypoglycemia initially  
-Lantus 60 Daily Premeal insulin SSI  
   
GABY: resolved  
-IVF's no discontinued, monitor renal function closely while diuresing  
   
Obesity: BMI 36.73  
   
Code Status: Full code Surrogate Decision Maker: Wife Jodee   
   
DVT Prophylaxis: SQ heparin GI Prophylaxis: not indicated  
   
Baseline: Patient is ambulatory at baseline  
   
Subjective:  
   
Chief Complaint / Reason for Physician Visit: follow-up pneumonia Patient seen for follow-up  
   
Continues to be on 5 l NC. Feels better. Denies CP, SOB Pulm: IMPRESSION:  
· Pneumonia- ?cap or atypical or viral - no vaping . - has evolved suspect \"Leaky lungs\". · Acute hypoxic resp failure - sat 91 to 93 % on 5 liter nc. · Diabetic · Suspected ady · Mild gaby · Leukocytosis · Bronchospasm-asthmatic bronchitis · Mild to moderate mr with pasp 33 · Elevated probnp   
   
RECOMMENDATIONS:  
· Agree with the comprehensive care provided by hospitalist team.   
· o2   
· No need for fob at this time- not suspected to be increased risk for opportunistic infection · Moon Raya / Phu Segovia · continue close monitoring · Agree with careful diuresis in view of suspected leaky lungs · Agree with viral panel, urine ag · Will send hypersensitivity panel to cover bases · He says hiv status is neg but is not sure when it was checked. . By hx  Extremely low risk  So will hold on checking for now . · Agree with restarting  Steroid- may just try po pred and continue bls monitoring. · Continue current ab   
   
Subjective:  
   
This patient has been seen and evaluated at the request of Dr. Paco Lema acute hypoxic resp failure and possible ards. . Patient is a 46 y.o. male  Who resport a cough for about 2 week.   Sputum was initially brown with no heme. He progressed on thanksgiving day with chills , increased sob and worsening fatigue. He presented  And was admitted on 11/29. Cxr has progressed from 11/29 to 12/ 2 . He required increased o2 and  Is currently at 5 liter nc. [facility] says he feels better. He has been comprehensively managed.  So far bc ng .    
Sick contact was his wife who returned from a trip out of state where several family members were sick and then she got sick with a colde that evolved into bronchitis.  ( She was on ab and pred and inhaler. )   
 Pt denies smoking, Damian Arredondo admits to exposure to nitric acid, hydrochloric acid , acetate and natha in his occupation as an [employer] but says the area is well ventilated and he has been around this for years.   
   
  
  
  
   
Pneumonia - Care Day 2 (11/30/2019) by Blaine Donald RN  
 
   
Review Entered Review Status 12/2/2019 14:55 Completed  
   
Criteria Review Care Day: 2 Care Date: 11/30/2019 Level of Care:   
Guideline Day 2 Clinical Status   
(X) * No CO2 retention or acidosis   
(X) * No requirement for mechanical ventilation   
(X) * Hypotension absent   
(X) * Fever absent or reduced ( ) * No hypoxia on room air or oxygenation improved 12/2/2019 14:55:58 EST by Keren Riley   
  Attempted to wean O2 down O2 sat dropped to 90% placed back on O2 4LNC   
(X) * Mental status improved or at baseline Activity   
(X) * Increased activity Interventions (X) Pulse oximetry 12/2/2019 14:55:58 EST by Azael Avalos   
  Spot check (X) Possible oxygen 12/2/2019 14:55:58 EST by Rolf Avelar Medications (X) IV or oral antibiotics 12/2/2019 14:55:58 EST by Azael Avalos   
  zithromax ivpb daily Rocephin ivpb daily * Milestone Additional Notes 11/30/19 Review VS 97.9, 86, 22, 90% RA placed back on 4LNC 95%, 134/64 Hospitalist H&P  
   
Patient seen for follow-up Feeling better today Taking good PO Pneumonia with Bronchitis and Bronchospasm causing Acute Respiratory Failure with Hypoxia (all POA) Severe Sepsis from above  
-admit CXR read as: \"Bronchitis with bilateral lower lobe pneumonia or alveolitis\" -continue on Rocephin and Azithromycin  
-await Cultures  
-start solumedrol  
-scheduled Nebs  
-wean O2 as tolerated  
   
Diabetes, Type II: uncontrolled in patient with hyperglycemia: insulin regimen modified as follows today  
-Lantus 100 units daily (this is the hospital equivalent for patient's home [facility]) -NPH 35 units q6h linked with solumedrol  
-Humalog 16 units TIDAC ordered  
-Sliding scale (resistant coverage); q4h glucose checks ordered   
   
GABY: improving  
-continue IVFs  
   
No imaging today Wbc 19.7, Rbc 3.79, Hgb 11.7, Hct 35.7, Na 135, Gluc 382, BUN 48, Crea 1.50, Ca 8.1 Tele, Routine VS, Zithromax ivpb daily, Rocephin ivpb daily, Lantus sc daily, NS 125cc/hr, Solumedrol 40mg q6hrs,  POC Gluc q4hrs with ssc, Xopenex neb q6hrs, Oximetry spot check, Flutter Valve tx q4hrs, Up ad sherly, Zofran ivp prn x1 DM Diet

## 2020-01-23 NOTE — ADT AUTH CERT NOTES
Clinical Date 12/7/19 by Trina Lemus RN  
 
   
Review Entered Review Status 1/23/2020 09:16 In Primary  
   
Criteria Review 12/7/19 LOC: IP Telemetry VS: 98.1, 86, 18, 123/62, 91% 3lpm NC Meds: Norvasc 5mg po qd, asa 81mg po qd, pulmicort neb bid, rocephin 2g iv qd, cymbalta 30mg po bid, Pepcid 10mg po bid, breo ellipta in qd, Lasix 20mg po qd, mucinex 600mg po bid, heparin 5000u sc q8h, lantus 70u sc qd, humalog sc qid, xopenex 1.25mg q6h, Toprol xl 25mg po qd, prednisone 20mg po bid, IM: Assessment / Plan: 
Acute Hypoxic Respiratory Failure POA, improving 2/2 PNA and ARDS 
-B/L PNA POA Strep Pneumonia 
-Leucocytosis, improving 
-Initially admitted with PNA, developed ARDS 
-Continue Abx  
--On p.o. steroids Lasix 20 daily 
-Appreciate pulmonary help 
-continue Nebs 
-wean O2 as tolerated 
  
-Respiratory PCR negative for viral panel - 
  
  
Diabetes, Type II: uncontrolled in patient with hyperglycemia, improved some 
-continue lantus Premeal insulin SSI 
  
GABY: resolved 
-IVF's no discontinued, monitor renal function closely while diuresing 
  
Obesity: BMI 36.73 
  
Code Status: Full code Surrogate Decision Maker: Wife FRANCO  
  
DVT Prophylaxis: SQ heparin GI Prophylaxis: not indicated 
  
Baseline: Patient is ambulatory at baseline 
  
Continues to require o2 today. Hopefully we can wean down tomorrow. Difficult to set up home o2 without chronic diagnosis 
  
  
Subjective: 
  
Chief Complaint / Reason for Physician Visit: follow-up pneumonia Patient seen for follow-up 
  
Now on 1.5 L NC. Improving Attempt to wean down to room air failed yesterday  
   
Clinical Date 12/6/19 by Trina Lemus RN  
 
   
Review Entered Review Status 1/23/2020 09:06 In Primary  
   
Criteria Review 12/6/19 LOC: IP Telemetry VS: 97.5, 86, 20, 124/64, 90% 1lpm NC Abnl labs: wbc 16.0, rbc 4.00, plt 420, neutro 77, lymph 8,  
 Meds: Norvasc 5mg po qd, asa 81mg po qd, pulmicort meds, rocephin 2g iv qd, cymbalta 30mg po bid, Pepcid 10mg po bid, breo ellipta in qd, Lasix 40mg po qd, mucinex 600mg po bid, heparin 5000u sc q8h, lantus 70u sc qd, humalog sc ssi qid, xopenex 1.25mg neb q6h, Toprol xl 25mg po qd, klorcon 10mEq po bid, prednisone 20mg po x 1,  
Pulm:   
IMPRESSION: 
·           Pneumonia- ?cap or atypical or viral - no vaping . -  Urine ag suggest strep pneumonia ·           Acute hypoxic resp failure - now on 2 liters ·           Diabetic ·           Suspected ady ·           Mild uli ·           Leukocytosis- better ·           Bronchospasm-asthmatic bronchitis- sounds  Much better today ·           Mild to moderate mr with pasp 33 ·           Elevated probnp ·           Wheezing - he denies hx of this -  Likely related to the acute resp  Infection - better  
  
RECOMMENDATIONS: 
·           Agree with the comprehensive care provided by hospitalist team.  
·           o2  And wean as able ·           Rt / nebs - wife  Has clean neb machine at home and he will use hers with new tubing . Will need med - change to albuterol  qid and budesonide . 5mg bid at dc -  If we can start him on  Hand held like breo we can dc the budesonide at this  [facility] ·            continue close monitoring ·           Agree with careful diuresis in view of suspected leaky lungs - drop to lasix once a day ·           Agree with viral panel, urine ag - fu ag - strep positive ·           Will send hypersensitivity panel to cover bases - never sent but as better and as  We have dx will fore ·           Agree with restarting  Steroid- may just try po pred and continue bls monitoring. - drop further if better in am   
·           Continue current ab - set stop date  Pd repeat cxr. zithromax dc today -  
·           Fu cxr - still pd ·           Discussed possible dc plans with pt , wife and Dr. Ivonne Bridges . ·           Fu  Oximetry studies- if  There are plans for sat dc can do today  
  
Subjective: 
  
This patient has been seen and evaluated at the request of Dr. Leigh Hashimoto  For acute hypoxic resp failure and possible ards. . Patient is a 46 y.o. male  Who resport a cough for about 2 week. Sputum was initially brown with no heme. He progressed on thanksgiving day with chills , increased sob and worsening fatigue. He presented  And was admitted on 11/29. Cxr has progressed from 11/29 to 12/ 2 . He required increased o2 and  Is currently at 5 liter nc. He says he feels better. He has been comprehensively managed. So far bc ng . Sick contact was his wife who returned from a trip out of state where several family members were sick and then she got sick with a colde that evolved into bronchitis. ( She was on ab and pred and inhaler. ) Pt denies smoking,  Vaping. He admits to exposure to nitric acid, hydrochloric acid , acetate and natha in his occupation as an  but says the area is well ventilated and he has been around this for years. IM: Assessment / Plan: 
Acute Hypoxic Respiratory Failure POA, improving 2/2 PNA and ARDS 
-B/L PNA POA Strep Pneumonia 
-Leucocytosis, improving 
-Initially admitted with PNA, developed ARDS 
-Continue Abx  
--On p.o. steroids Decreased lasix 
-Appreciate pulmonary help 
-continue Nebs 
-wean O2 as tolerated 
  
-Respiratory PCR negative for viral panel - 
  
  
Diabetes, Type II: uncontrolled in patient with hyperglycemia, improved some 
-continue lantus Premeal insulin SSI 
  
GABY: resolved 
-IVF's no discontinued, monitor renal function closely while diuresing 
  
Obesity: BMI 36.73 
  
Code Status: Full code Surrogate Decision Maker: Wife BARRINGTON  
  
DVT Prophylaxis: SQ heparin GI Prophylaxis: not indicated 
  
Baseline: Patient is ambulatory at baseline Hopefully Discharge tomorrow, if we can wean down o2 by tomorrow. 
  
  
Subjective: 
  
 Chief Complaint / Reason for Physician Visit: follow-up pneumonia Patient seen for follow-up 
  
Now on 2 L NC. Doing well. 
   
   
Clinical Date 12/5/19 by Diana Mckenzie RN  
 
   
Review Entered Review Status 1/23/2020 08:52 In Primary  
   
Criteria Review 12/5/19 LOC: IP Telemetry VS: 97.9, 96, 16, 153/66, 94% 4lpm NC Abnl labs: wbc 16.2, neutro 82, lymph 5, na 133, Gluc 300, bun 35, Meds: lantus 10u sc x 1, Toprol xl 12.5mg po x 1, prednisone 30mg po bid, Norvasc 5mg po qd, asa 81mg po qd, pulmicort neb bid, rocephin 2g iv qd, cymbalta 30mg po bid, Pepcid 10mg po bid, Lasix 40mg po x 1, mucinex 600mg po bid, heparin 5000u sc q8h, lantus 70u sc qd, humalog sc ssi qid, xopenex 1.25mg neb q6h, Lopressor 12.5mg po x 1, klorcon 10mEQ po bid,  
Cardiology: Assessment:   
  
Cough, chills, pneumonia, leukocytosis, GABY Acute diastolic chf vs. ARDS.   
- No hx of CAD 
- Echo 2011 EF 60%, Echo 12/2019 EF 55%, mild to mod MR 
- sinus - Hypertensive heart disease with CKD - Insulin DM Dr. Karla Soria - Dyslipidemia - CVA in 2011 
- Obese/Snores, recommended sleep study , 2 kids, works for [employer] [place] and as an , physical job 
  
  
            Plan:    
  
Initial presentation consistent with pneumonia with fever, chills, leukocytosis. GABY, HCTZ held and given diuretic, hydration, but then possible acute diastolic chf. 
  
Echo normal EF, mild to mod MR Outpt sleep study CXR improving 
  
1. Cont ASA 2. Cont added metoprolol succ 25mg qd 3. Cont amlodipine 5mg daily, was on 10mg as outpt 4. Holding lisinopril, resume at discharge 5. Stop HCTZ, cont with lasix 40mg po bid, likely discharge on every day with kcl 10meq daily, follow bmp 6. Cont crestor 
  
Appreciate pulmonary evaluation, on steroids as well. Improving, CXR improving, tapering O2 Follow up with me or NP in 2-4 weeks. 
 IM: Assessment / Plan: Acute Hypoxic Respiratory Failure POA, improving 2/2 PNA and ARDS 
-B/L PNA POA 
-Leucocytosis, improving 
-Initially admitted with PNA, developed ? ARDS/Pulm edema 
-Continue Abx  
--On p.o. steroids 
-Appreciate pulmonary help 
-continue Nebs 
-wean O2 as tolerated 
-Awaiting sputum culture 
-Respiratory PCR negative for viral panel 
-Hopefully will turn around soon 
  
  
Diabetes, Type II: uncontrolled in patient with hyperglycemia:  
-Episodes of Hypoglycemia initially 
-Lantus 60 Daily, continue Premeal insulin SSI 
  
GABY: resolved 
-IVF's no discontinued, monitor renal function closely while diuresing 
  
Obesity: BMI 36.73 
  
Code Status: Full code Surrogate Decision Maker: Wife TISH  
  
DVT Prophylaxis: SQ heparin GI Prophylaxis: not indicated 
  
Baseline: Patient is ambulatory at baseline 
  
Pt with PNA/ARDS slowly improving, still with wheezing now on 4 L o2. Attempting to wean down. May need o2 challenge for Temp Home o2 for PNA/ARDS if he continues  remains on o2 for next 2-3 days. 
  
  
Subjective: 
  
Chief Complaint / Reason for Physician Visit: follow-up pneumonia Patient seen for follow-up 
  
Now on 4 L o2. Pt denies any complains. No cough PUlm: IMPRESSION: 
·           Pneumonia- ?cap or atypical or viral - no vaping . - has evolved suspect \"Leaky lungs\". - cxr today looks much better with decreased infiltrates . So far tests back are neg -- sputum culture is pd -  Few gram positive cocci. ·           Acute hypoxic resp failure - sat 91 to 93 % on 4 liter nc. -  This has been slow to recover. ·           Diabetic ·           Suspected ady ·           Mild gaby ·           Leukocytosis ·           Bronchospasm-asthmatic bronchitis- sounds a little better today ·           Mild to moderate mr with pasp 33 ·           Elevated probnp ·           Wheezing - he denies hx of this -  Likely related to the acute resp  Infection  
  
RECOMMENDATIONS: 
 ·           Agree with the comprehensive care provided by hospitalist team.  
·           o2  And wean as able ·           No need for fob at this time- not suspected to be increased risk for opportunistic infection ·           Rt / nebs - wife  Has clean neb machine at home and he will use hers with new tubing . Will need med - change to albuterol  qid and budesonide . 5mg bid at dc ·            continue close monitoring ·           Agree with careful diuresis in view of suspected leaky lungs - drop to once a day ·           Agree with viral panel, urine ag - fu ag - strep positive ·           Will send hypersensitivity panel to cover bases - pd  
·           He says hiv status is neg but is not sure when it was checked. . By hx  Extremely low risk  So will hold on checking for now . As cxr is better not needed. ·           Agree with restarting  Steroid- may just try po pred and continue bls monitoring. - drop further if better in am   
·           Continue current ab - set stop date  Pd repeat cxr. zithromax dc today ·           Discussed possible dc plans with pt , wife and Dr. Dayan Mckeon .  
  
Subjective: 
  
This patient has been seen and evaluated at the request of Dr. Dayan Mckeon  For acute hypoxic resp failure and possible ards. . Patient is a 46 y.o. male  Who resport a cough for about 2 week. Sputum was initially brown with no heme. He progressed on thanksgiving day with chills , increased sob and worsening fatigue. He presented  And was admitted on 11/29. Cxr has progressed from 11/29 to 12/ 2 . He required increased o2 and  Is currently at 5 liter nc. He says he feels better. He has been comprehensively managed. So far bc ng . Sick contact was his wife who returned from a trip out of [facility] where several family members were sick and then she got sick with a colde that evolved into bronchitis. ( She was on ab and pred and inhaler. ) Pt denies smoking,  Vaping. He admits to exposure to nitric acid, hydrochloric acid , acetate and natha in his occupation as an  but says the area is well ventilated and he has been around this for years.

## 2020-05-01 DIAGNOSIS — E10.65 UNCONTROLLED TYPE 1 DIABETES MELLITUS WITH HYPERGLYCEMIA (HCC): Primary | ICD-10-CM

## 2020-05-01 DIAGNOSIS — I10 ESSENTIAL HYPERTENSION, BENIGN: ICD-10-CM

## 2020-05-01 DIAGNOSIS — E55.9 VITAMIN D DEFICIENCY: ICD-10-CM

## 2020-05-01 DIAGNOSIS — E78.5 HYPERLIPIDEMIA LDL GOAL <70: ICD-10-CM

## 2020-05-05 LAB
25(OH)D3+25(OH)D2 SERPL-MCNC: 39.3 NG/ML (ref 30–100)
ALBUMIN SERPL-MCNC: 4.5 G/DL (ref 3.8–4.9)
ALBUMIN/CREAT UR: 63 MG/G CREAT (ref 0–29)
ALBUMIN/GLOB SERPL: 1.8 {RATIO} (ref 1.2–2.2)
ALP SERPL-CCNC: 102 IU/L (ref 39–117)
ALT SERPL-CCNC: 18 IU/L (ref 0–44)
AST SERPL-CCNC: 28 IU/L (ref 0–40)
BILIRUB SERPL-MCNC: 0.4 MG/DL (ref 0–1.2)
BUN SERPL-MCNC: 24 MG/DL (ref 6–24)
BUN/CREAT SERPL: 19 (ref 9–20)
CALCIUM SERPL-MCNC: 10 MG/DL (ref 8.7–10.2)
CHLORIDE SERPL-SCNC: 95 MMOL/L (ref 96–106)
CHOLEST SERPL-MCNC: 202 MG/DL (ref 100–199)
CO2 SERPL-SCNC: 25 MMOL/L (ref 20–29)
CREAT SERPL-MCNC: 1.28 MG/DL (ref 0.76–1.27)
CREAT UR-MCNC: 142.9 MG/DL
EST. AVERAGE GLUCOSE BLD GHB EST-MCNC: 249 MG/DL
GLOBULIN SER CALC-MCNC: 2.5 G/DL (ref 1.5–4.5)
GLUCOSE SERPL-MCNC: 167 MG/DL (ref 65–99)
HBA1C MFR BLD: 10.3 % (ref 4.8–5.6)
HDLC SERPL-MCNC: 34 MG/DL
INTERPRETATION, 910389: NORMAL
LDLC SERPL CALC-MCNC: 118 MG/DL (ref 0–99)
Lab: NORMAL
MICROALBUMIN UR-MCNC: 90.5 UG/ML
POTASSIUM SERPL-SCNC: 5.4 MMOL/L (ref 3.5–5.2)
PROT SERPL-MCNC: 7 G/DL (ref 6–8.5)
SODIUM SERPL-SCNC: 135 MMOL/L (ref 134–144)
TRIGL SERPL-MCNC: 251 MG/DL (ref 0–149)
VLDLC SERPL CALC-MCNC: 50 MG/DL (ref 5–40)

## 2020-05-07 ENCOUNTER — TELEPHONE (OUTPATIENT)
Dept: ENDOCRINOLOGY | Age: 50
End: 2020-05-07

## 2020-05-07 NOTE — TELEPHONE ENCOUNTER
Sent him the following message through [employer]:    Portillocarloschris Mendoza,    [id number] [facility] office is closed today and will be back open tomorrow. Antonina will contact you prior to the visit to make sure you are all set to do a video visit. Thanks.

## 2020-05-07 NOTE — TELEPHONE ENCOUNTER
----- Message from Radha Valente sent at 5/7/2020 10:28 AM EDT -----  Regarding: Dr. Jorge Sabillon  Patient return call    Caller's first and last name and relationship (if not the patient):  pt    Best contact number(s):  [phone]    Whose call is being returned:   The office    Details to clarify the request:  Pt is still willing to do telephone appt scheduled tomorrow Friday 05/08/2020 at 2:15 PM.     Radha Valente

## 2020-05-08 ENCOUNTER — VIRTUAL VISIT (OUTPATIENT)
Dept: ENDOCRINOLOGY | Age: 50
End: 2020-05-08

## 2020-05-08 DIAGNOSIS — E55.9 VITAMIN D DEFICIENCY: ICD-10-CM

## 2020-05-08 DIAGNOSIS — E10.65 UNCONTROLLED TYPE 1 DIABETES MELLITUS WITH HYPERGLYCEMIA (HCC): Primary | ICD-10-CM

## 2020-05-08 DIAGNOSIS — I10 ESSENTIAL HYPERTENSION, BENIGN: ICD-10-CM

## 2020-05-08 DIAGNOSIS — E78.5 HYPERLIPIDEMIA LDL GOAL <70: ICD-10-CM

## 2020-05-08 NOTE — PROGRESS NOTES
Chief Complaint   Patient presents with    Diabetes     pcp and phramacy confirmed       **THIS IS A VIRTUAL VISIT VIA A VIDEO SYNCHRONOUS DISCUSSION. PATIENT AGREED TO HAVE THEIR CARE DELIVERED OVER A MYCHART VIDEO VISIT IN PLACE OF THEIR REGULARLY SCHEDULED OFFICE VISIT**    History of Present Illness: Joceline Marcelo is a 44 y.o. male here for follow up of diabetes. He was very ill the day after Thanksgiving and had a fever and cough and shortness of breath and was taken to the ER and admitted to the hospital for about a week and was treated with steroids and abx and had hypoxia and it's possible he had COVID and his sugars were in the 500s. Did have one other respiratory infection in 2/20 that resolved after 3 days and otherwise has been fine. Was just laid off yesterday from his job at the [place] [employer] [id number]. Has his benefits through his part time job at [employer]. Has stayed on tresiba 100 units daily and hasn't had any lows hardly at all the past 6 months. Still dosing novolog 10 with meals + 2 units for every 50 mg/dl above 150 mg/dl. Has had higher fasting sugars in the 200s due to less activity and has been doing some stress eating. His weight is around 220 up from 213 at last check in 11/19. Compliant with BP and lipid regimen. Has had more pain and numbness in his hands that have woken him from sleep. Wrist splints have not helped too much so told him to see Dr. Shasha Garcia in case he has neck arthritis. The cymbalta has helped feet neuropathy. Current Outpatient Medications   Medication Sig    metoprolol succinate (TOPROL-XL) 25 mg XL tablet Take 1 Tab by mouth daily.  amLODIPine (NORVASC) 5 mg tablet TAKE 1 TABLET DAILY    lisinopril (PRINIVIL, ZESTRIL) 40 mg tablet Take 1/2 tablet daily    DULoxetine (CYMBALTA) 30 mg capsule Take 1 Cap by mouth two (2) times a day.     hydroCHLOROthiazide (HYDRODIURIL) 25 mg tablet TAKE 1 TABLET BY MOUTH EVERY DAY    insulin aspart U-100 (NOVOLOG FLEXPEN U-100 INSULIN) 100 unit/mL (3 mL) inpn FOR DIRECTIONS ON HOW TO   TAKE THIS MEDICINE, READ   THE ENCLOSED MEDICATION    INFORMATION FORM    b complex vitamins (B COMPLEX 1) tablet Take 1 Tab by mouth daily.  TRESIBA FLEXTOUCH U-200 200 unit/mL (3 mL) inpn Inject 100 units in the morning--dispense 18 pens for 90 day supply--Dose change 11/1/19--updated med list--did not send prescription to the pharmacy    rosuvastatin (CRESTOR) 20 mg tablet Take 1/2 tab daily--Dose change 1/17/19--updated med list--did not send prescription to the pharmacy    Insulin Needles, Disposable, (BD ULTRA-FINE MINI PEN NEEDLE) 31 gauge x 3/16\" ndle Use as directed 3 times daily    insulin syringe-needle U-100 1 mL 31 gauge x 15/64\" syrg 1 Syringe by SubCUTAneous route five (5) times daily.  aspirin 81 mg chewable tablet Take 81 mg by mouth daily.  ONETOUCH ULTRA TEST strip Test up to 6 times daily. Dx: 250.03    ONE TOUCH DELICA 33 gauge misc Use as directed up to 6 times daily. Dx: 250.03    cholecalciferol, vitamin D3, (VITAMIN D3) 2,000 unit Tab Take  by mouth daily.  MV-MN/FA/D3/LYCOPENE/LUT/COQ10 (DAILY MULTIVITAMIN PO) Take  by mouth. No current facility-administered medications for this visit.       No Known Allergies     Review of Systems: PER HPI    Physical Examination:  - GENERAL: NCAT, Appears well nourished   - EYES: EOMI, non-icteric, no proptosis   - Ear/Nose/Throat: NCAT, no visible inflammation or masses   - CARDIOVASCULAR: no cyanosis, no visible JVD   - RESPIRATORY: respiratory effort normal without any distress or labored breathing   - MUSCULOSKELETAL: Normal ROM of neck and upper extremities observed   - SKIN: No rash on face  - NEUROLOGIC:  No facial asymmetry (Cranial nerve 7 motor function), No gaze palsy   - PSYCHIATRIC: Normal affect, Normal insight and judgement       Data Reviewed:   Component      Latest Ref Rng & Units 5/4/2020 5/4/2020 5/4/2020 5/4/2020           7:52 AM  7:52 AM  7:52 AM 7:52 AM   Glucose      65 - 99 mg/dL  167 (H)     BUN      6 - 24 mg/dL  24     Creatinine      0.76 - 1.27 mg/dL  1.28 (H)     GFR est non-AA      >59 mL/min/1.73  64     GFR est AA      >59 mL/min/1.73  74     BUN/Creatinine ratio      9 - 20  19     Sodium      134 - 144 mmol/L  135     Potassium      3.5 - 5.2 mmol/L  5.4 (H)     Chloride      96 - 106 mmol/L  95 (L)     CO2      20 - 29 mmol/L  25     Calcium      8.7 - 10.2 mg/dL  10.0     Protein, total      6.0 - 8.5 g/dL  7.0     Albumin      3.8 - 4.9 g/dL  4.5     GLOBULIN, TOTAL      1.5 - 4.5 g/dL  2.5     A-G Ratio      1.2 - 2.2  1.8     Bilirubin, total      0.0 - 1.2 mg/dL  0.4     Alk. phosphatase      39 - 117 IU/L  102     AST      0 - 40 IU/L  28     ALT (SGPT)      0 - 44 IU/L  18     Cholesterol, total      100 - 199 mg/dL 202 (H)      Triglyceride      0 - 149 mg/dL 251 (H)      HDL Cholesterol      >39 mg/dL 34 (L)      VLDL, calculated      5 - 40 mg/dL 50 (H)      LDL, calculated      0 - 99 mg/dL 118 (H)      Creatinine, urine      Not Estab. mg/dL    142.9   Microalbumin, urine      Not Estab. ug/mL    90.5   Microalbumin/Creat. Ratio      0 - 29 mg/g creat    63 (H)   Hemoglobin A1c, (calculated)      4.8 - 5.6 %   10.3 (H)    Estimated average glucose      mg/dL   249      Component      Latest Ref Rng & Units 5/4/2020           7:52 AM   VITAMIN D, 25-HYDROXY      30.0 - 100.0 ng/mL 39.3       Assessment/Plan:     1.  DM w/o complication type I, uncontrolled (250.03) his most recent Hgb A1c was 10.3% in 5/20 up from 8.1% in 10/19 down from 9.9% in 4/19 up from 7.7% in 10/18 down from 8.1% in 3/18 up from 7.4% in 9/17 down from 8.3% in 4/17 down from 8.8% in 11/16 up from 8.3% in 6/16 down from 8.6% in 2/16 up from 8.3% in 9/15 down from 9.4% in 4/15 up from 9.3% in 1/14 down from 10.4% in 9/13 up from 10.1% in 5/13 up from 8.1% in 1/13 down from 8.7% in Oct up from 8.3% in May up from 7.7% in February 2012 up from 6.8% in November 2011 down from 9% in September. A1c is above goal of <7.5% given hypoglycemia unawareness. Will increase tresiba while under more stress  - increase tresiba to 110 units in the morning  - cont novolog 10 units before each meal + 2 units for every 50 mg/dl above 150 mg/dl   - check bs up to 6 times per day due to fluctuating blood sugars  - foot exam done 7/18 by podiatry  - kristina STEELD 2/19  - microalbumin nl 2/12 and 5/12, up to 37 in 5/13, down to 23 in 6/16, up to 47 in 4/17 and 148 in 3/18 and 141 in 4/19, down to 63 in 5/20  - check A1c and cmp prior to next visit (send to [id number])         2. HTN (hypertension) (401. 9AF) his BP was just above goal < 140/90 at last visit in 11/19. Will focus on weight loss. - cont norvasc 10 mg daily  - cont lisinopril 40 mg daily  - cont hctz 25 mg daily  - monitor home readings       3. Other and unspecified hyperlipidemia (272.4) Given DM and TIA, Goal LDL < 70, non-HDL < 100, and TG < 150. LDL 94 in November 2011 and 97 in February 2012. Up to 106 in May despite switching to lipitor but back to 97 in Oct 2012 and 65 in 1/13 but up to 131 in 5/13 due to diet. Had been off lipitor for 12 days in 9/13 and LDL was unable to be calculated due to TG of 565 and non-HDL of 228.  in 1/14. Couldn't be calculated in 4/15 due to high TG of 874 while off lipitor.  and  in 9/15.  and  in 2/16 and 175 and 231 and LDL 96 in 6/16.  and  in 11/16 due to being off lipitor for 1 week but down to TC of 204 and TG of 253 in 4/17.  and  in 9/17.  and  in 3/18.  and  in 10/18 so stopped lipitor and changed to crestor 20 mg but had myalgias so decreased to 1/2 tab in 1/19 and  and  in 4/19 with higher A1c.  and  in 10/19 with lower A1c.  and  in 5/20 with higher A1c.  - cont crestor 20 mg 1/2 tab at night  - check lipids prior to next visit     4.   Vitamin D deficiency (268.9) His level was 17.2 in February and up to 29.9 in May and 39.4 in Oct 2012 and 45 in 1/13 and 36 in 5/13. Down to 25.6 in 9/13 but had been off vitamin D and up to 31.9 in 1/14. Currently off vitamin D and level 14.7 in 4/15 and 19.7 in 9/15. Back on since then and 43 in 2/16 and 41 in 6/16 and 39 in 4/17 and 33 in 3/18 and 36 in 4/19 and 39 in 5/20. Goal > 30.       - cont vitamin D 2000 units daily       - check Vitamin D 25-OH level in 5/21      Patient Instructions   1) Go up on tresiba to 110 units daily to try and get fasting sugars under 150. If going under 100 as stress lessens and diet improves, then go back to 100 units daily. 2) Your can try Dr. Moriah Zhao for nec arthritis as the cause of your numbness and pain in your hands. [phone]. 3) Your cholesterol was higher due to A1c going up to 10.3% as higher sugars raise cholesterol. 4) Your urine protein was better. 5) Please come for a follow up visit on 11/13/20 at 2:10pm in our [place] office. 6) I will mail you a lab slip. Please put this in the glove compartment or other safe spot where you keep your medical papers and I will send you a reminder to have your labs drawn prior to next visit.           Follow-up and Dispositions    · Return for  11/13/20 at 2:10pm.               Copy sent to:  Dr. Shannan Porter

## 2020-05-08 NOTE — PATIENT INSTRUCTIONS
1) Go up on tresiba to 110 units daily to try and get fasting sugars under 150. If going under 100 as stress lessens and diet improves, then go back to 100 units daily. 2) Your can try Dr. Lisa Zapien for nec arthritis as the cause of your numbness and pain in your hands. [phone]. 3) Your cholesterol was higher due to A1c going up to 10.3% as higher sugars raise cholesterol. 4) Your urine protein was better. 5) Please come for a follow up visit on 11/13/20 at 2:10pm in our [place] office. 6) I will mail you a lab slip. Please put this in the glove compartment or other safe spot where you keep your medical papers and I will send you a reminder to have your labs drawn prior to next visit.

## 2020-07-17 RX ORDER — INSULIN ASPART 100 [IU]/ML
INJECTION, SOLUTION INTRAVENOUS; SUBCUTANEOUS
Qty: 90 ML | Refills: 3 | Status: SHIPPED | OUTPATIENT
Start: 2020-07-17 | End: 2021-08-03

## 2020-07-17 RX ORDER — DULOXETIN HYDROCHLORIDE 60 MG/1
60 CAPSULE, DELAYED RELEASE ORAL 2 TIMES DAILY
Qty: 180 CAP | Refills: 3 | Status: SHIPPED | OUTPATIENT
Start: 2020-07-17 | End: 2021-05-14

## 2020-07-17 RX ORDER — LISINOPRIL 40 MG/1
TABLET ORAL
Qty: 90 TAB | Refills: 3 | Status: SHIPPED | OUTPATIENT
Start: 2020-07-17 | End: 2020-12-01 | Stop reason: SDUPTHER

## 2020-07-17 RX ORDER — INSULIN DEGLUDEC INJECTION 200 U/ML
INJECTION, SOLUTION SUBCUTANEOUS
Qty: 54 ML | Refills: 3 | Status: SHIPPED | OUTPATIENT
Start: 2020-07-17 | End: 2021-08-03

## 2020-07-17 RX ORDER — AMLODIPINE BESYLATE 10 MG/1
TABLET ORAL
Qty: 90 TAB | Refills: 3 | Status: SHIPPED | OUTPATIENT
Start: 2020-07-17 | End: 2020-12-01 | Stop reason: SDUPTHER

## 2020-07-17 RX ORDER — HYDROCHLOROTHIAZIDE 25 MG/1
TABLET ORAL
Qty: 90 TAB | Refills: 3 | Status: SHIPPED | OUTPATIENT
Start: 2020-07-17 | End: 2020-12-01 | Stop reason: SDUPTHER

## 2020-10-30 DIAGNOSIS — E55.9 VITAMIN D DEFICIENCY: ICD-10-CM

## 2020-10-30 DIAGNOSIS — E10.65 UNCONTROLLED TYPE 1 DIABETES MELLITUS WITH HYPERGLYCEMIA (HCC): ICD-10-CM

## 2020-10-30 DIAGNOSIS — I10 ESSENTIAL HYPERTENSION, BENIGN: ICD-10-CM

## 2020-10-30 DIAGNOSIS — E78.5 HYPERLIPIDEMIA LDL GOAL <70: ICD-10-CM

## 2020-11-07 LAB
ALB/GLOBRATIO, 58C: 1.6 (CALC) (ref 1–2.5)
ALBUMIN SERPL-MCNC: 4.1 G/DL (ref 3.6–5.1)
ALKALINE PHOSPHATASE, TOTAL, 25002000: 113 U/L (ref 35–144)
ALT SERPL-CCNC: 19 U/L (ref 9–46)
AST SERPL W P-5'-P-CCNC: 29 U/L (ref 10–35)
BILIRUB SERPL-MCNC: 0.6 MG/DL (ref 0.2–1.2)
BUN SERPL-MCNC: 18 MG/DL (ref 7–25)
BUN/CREATININE RATIO,BUCR: ABNORMAL (CALC) (ref 6–22)
CALCIUM SERPL-MCNC: 9.3 MG/DL (ref 8.6–10.3)
CHLORIDE SERPL-SCNC: 97 MMOL/L (ref 98–110)
CHOL/HDL RATIO,CHHDX: 7.8 (CALC)
CHOLEST SERPL-MCNC: 288 MG/DL
CO2 SERPL-SCNC: 32 MMOL/L (ref 20–32)
CREAT SERPL-MCNC: 1.11 MG/DL (ref 0.7–1.33)
EAG (MG/DL),9916804: 249 (CALC)
EAG (MMOL/L),9916805: 13.8 (CALC)
GLOBULIN,GLOB: 2.6 G/DL (CALC) (ref 1.9–3.7)
GLUCOSE SERPL-MCNC: 194 MG/DL (ref 65–99)
HBA1C MFR BLD HPLC: 10.3 % OF TOTAL HGB
HDLC SERPL-MCNC: 37 MG/DL
LDL-CHOLESTEROL: ABNORMAL MG/DL (CALC)
NON-HDL CHOLESTEROL, 011976: 251 MG/DL (CALC)
POTASSIUM SERPL-SCNC: 4.7 MMOL/L (ref 3.5–5.3)
PROT SERPL-MCNC: 6.7 G/DL (ref 6.1–8.1)
SODIUM SERPL-SCNC: 136 MMOL/L (ref 135–146)
TRIGL SERPL-MCNC: 609 MG/DL (ref ?–150)

## 2020-11-13 ENCOUNTER — VIRTUAL VISIT (OUTPATIENT)
Dept: ENDOCRINOLOGY | Age: 50
End: 2020-11-13
Payer: COMMERCIAL

## 2020-11-13 DIAGNOSIS — I10 ESSENTIAL HYPERTENSION, BENIGN: ICD-10-CM

## 2020-11-13 DIAGNOSIS — E55.9 VITAMIN D DEFICIENCY: ICD-10-CM

## 2020-11-13 DIAGNOSIS — E78.5 HYPERLIPIDEMIA LDL GOAL <70: ICD-10-CM

## 2020-11-13 DIAGNOSIS — E10.65 UNCONTROLLED TYPE 1 DIABETES MELLITUS WITH HYPERGLYCEMIA (HCC): Primary | ICD-10-CM

## 2020-11-13 PROCEDURE — 99214 OFFICE O/P EST MOD 30 MIN: CPT | Performed by: INTERNAL MEDICINE

## 2020-11-13 NOTE — PATIENT INSTRUCTIONS
1) Try to take 10-15 units of novolog before you eat and check your sugar before and 2 hours after you eat. If your sugars are staying under 180, then the novolog was enough to cover your meals. 2) Check your sugar after not eating for 6-8 hours and if your value is under 130 then your tresiba is enough. If dropping under 90, then it's too much and cut back to 100 units. If over 140, it's not enough and try taking 120 units. 3) Take your crestor everyday as your cholesterol level is extremely high and this is due to your liver and not your diet. 4) Please come for a follow up visit on 5/14/21 at 2:10pm in our [place] office. 5) Reach out to me before your next visit and I'll e-mail you a lab slip to take to [facility]

## 2020-11-13 NOTE — PROGRESS NOTES
Chief Complaint   Patient presents with    Diabetes     pcp and pharmacy confirmed    Other     [phone] [facility]       **THIS IS A VIRTUAL VISIT VIA A VIDEO SYNCHRONOUS DISCUSSION. PATIENT AGREED TO HAVE THEIR CARE DELIVERED OVER A MYCHART VIDEO VISIT IN PLACE OF THEIR REGULARLY SCHEDULED OFFICE VISIT**    History of Present Illness: Kailey Walker is a 44 y.o. male here for follow up of diabetes. No further steroids since last visit. Has been doing weight watchers and being more active and started having more low sugars and weight is currently 209. Only checking 2 times a day and has seen fasting sugars in the 150-190s. Not checking too often after he eats. Currently taking about 112 units of tresiba. Did not think he needed much novolog with cutting back on portions but last night had chili and a few crackers and his sugar went from the 150s to the 190s without any novolog. He also has not been taking his crestor regularly as he didn't think he would need this if he was working on his diet. Compliant with BP regimen. Current Outpatient Medications   Medication Sig    DULoxetine (CYMBALTA) 60 mg capsule Take 1 Cap by mouth two (2) times a day. Delete the 30 mg caps from [address] Tresi FlexTouch U-200 200 unit/mL (3 mL) inpn INJECT 110  UNITS IN THE MORNING    amLODIPine (NORVASC) 10 mg tablet TAKE 1 TABLET DAILY    lisinopriL (PRINIVIL, ZESTRIL) 40 mg tablet TAKE 1 TABLET DAILY    insulin aspart U-100 (NovoLOG Flexpen U-100 Insulin) 100 unit/mL (3 mL) inpn INJECT 10 UNITS SUBCUTANEOUSLY 3 TIMES A DAY PLUS 2 UNITS FOR EVERY 50MG/DL ABOVE 150MG/DL. MAXIMUM 100 UNITS PER DAY    hydroCHLOROthiazide (HYDRODIURIL) 25 mg tablet TAKE 1 TABLET DAILY    metoprolol succinate (TOPROL-XL) 25 mg XL tablet Take 1 Tab by mouth daily.  b complex vitamins (B COMPLEX 1) tablet Take 1 Tab by mouth daily.     rosuvastatin (CRESTOR) 20 mg tablet Take 1/2 tab daily--Dose change 1/17/19--updated med list--did not send prescription to the pharmacy    Insulin Needles, Disposable, (BD ULTRA-FINE MINI PEN NEEDLE) 31 gauge x 3/16\" ndle Use as directed 3 times daily    insulin syringe-needle U-100 1 mL 31 gauge x 15/64\" syrg 1 Syringe by SubCUTAneous route five (5) times daily.  aspirin 81 mg chewable tablet Take 81 mg by mouth daily.  ONETOUCH ULTRA TEST strip Test up to 6 times daily. Dx: 250.03    ONE TOUCH DELICA 33 gauge misc Use as directed up to 6 times daily. Dx: 250.03    cholecalciferol, vitamin D3, (VITAMIN D3) 2,000 unit Tab Take  by mouth daily.  MV-MN/FA/D3/LYCOPENE/LUT/COQ10 (DAILY MULTIVITAMIN PO) Take  by mouth. No current facility-administered medications for this visit.       No Known Allergies     Review of Systems: PER HPI    Physical Examination:  - GENERAL: NCAT, Appears well nourished   - EYES: EOMI, non-icteric, no proptosis   - Ear/Nose/Throat: NCAT, no visible inflammation or masses   - CARDIOVASCULAR: no cyanosis, no visible JVD   - RESPIRATORY: respiratory effort normal without any distress or labored breathing   - MUSCULOSKELETAL: Normal ROM of neck and upper extremities observed   - SKIN: No rash on face  - NEUROLOGIC:  No facial asymmetry (Cranial nerve 7 motor function), No gaze palsy   - PSYCHIATRIC: Normal affect, Normal insight and judgement       Data Reviewed:   Component      Latest Ref Rng & Units 11/6/2020 11/6/2020 11/6/2020           9:39 AM  9:39 AM  9:39 AM   Glucose      65 - 99 mg/dL  194 (H)    BUN      7 - 25 mg/dL  18    Creatinine      0.70 - 1.33 mg/dL  1.11    GFR est non-AA      > OR = 60 mL/min/1.73m2  76    GFR est AA      > OR = 60 mL/min/1.73m2  89    BUN/Creatinine ratio      6 - 22 (calc)  NOT APPLICABLE    Sodium      135 - 146 mmol/L  136    Potassium      3.5 - 5.3 mmol/L  4.7    Chloride      98 - 110 mmol/L  97 (L)    CO2      20 - 32 mmol/L  32    Calcium      8.6 - 10.3 mg/dL  9.3    Protein, total      6.1 - 8.1 g/dL  6.7 Albumin      3.6 - 5.1 g/dL  4.1    Globulin      1.9 - 3.7 g/dL (calc)  2.6    ALB/GLOBRATIO      1.0 - 2.5 (calc)  1.6    Bilirubin, total      0.2 - 1.2 mg/dL  0.6    Alkaline Phosphatase, total      35 - 144 U/L  113    AST      10 - 35 U/L  29    ALT      9 - 46 U/L  19    Cholesterol, total      <200 mg/dL   288 (H)   HDL Cholesterol      > OR = 40 mg/dL   37 (L)   Triglyceride      <150 mg/dL   609 (H)   LDL-CHOLESTEROL      mg/dL (calc)      Cholesterol/HDL ratio      <5.0 (calc)   7.8 (H)   Non-HDL Cholesterol      <130 mg/dL (calc)   251 (H)   Hemoglobin A1c, (calculated)      <5.7 % of total Hgb 10.3 (H)     eAG (mg/dL)      (calc) 249     eAG (mmol/L)      (calc) 13.8         Assessment/Plan:     1. DM w/o complication type I, uncontrolled (250.03) his most recent Hgb A1c was 10.3% in 11/20 stable from 5/20 up from 8.1% in 10/19 down from 9.9% in 4/19 up from 7.7% in 10/18 down from 8.1% in 3/18 up from 7.4% in 9/17 down from 8.3% in 4/17 down from 8.8% in 11/16 up from 8.3% in 6/16 down from 8.6% in 2/16 up from 8.3% in 9/15 down from 9.4% in 4/15 up from 9.3% in 1/14 down from 10.4% in 9/13 up from 10.1% in 5/13 up from 8.1% in 1/13 down from 8.7% in Oct up from 8.3% in May up from 7.7% in February 2012 up from 6.8% in November 2011 down from 9% in September. A1c is above goal of <7.5% given hypoglycemia unawareness. Has not bee taking enough novolog with food so will work on this. - cont tresiba 110 units in the morning  - cont novolog 10 units before each meal + 2 units for every 50 mg/dl above 150 mg/dl   - check bs up to 6 times per day due to fluctuating blood sugars  - foot exam done 7/18 by podiatry  - optho UTD 2/19  - microalbumin nl 2/12 and 5/12, up to 37 in 5/13, down to 23 in 6/16, up to 47 in 4/17 and 148 in 3/18 and 141 in 4/19, down to 63 in 5/20  - check A1c and cmp and microalbumin prior to next visit (send to [facility])         2. HTN (hypertension) (401. 9AF) his BP was just above goal < 140/90 at last visit in 11/19. Will focus on weight loss. - cont norvasc 10 mg daily  - cont lisinopril 40 mg daily  - cont hctz 25 mg daily  - monitor home readings       3. Other and unspecified hyperlipidemia (272.4) Given DM and TIA, Goal LDL < 70, non-HDL < 100, and TG < 150. LDL 94 in November 2011 and 97 in February 2012. Up to 106 in May despite switching to lipitor but back to 97 in Oct 2012 and 65 in 1/13 but up to 131 in 5/13 due to diet. Had been off lipitor for 12 days in 9/13 and LDL was unable to be calculated due to TG of 565 and non-HDL of 228.  in 1/14. Couldn't be calculated in 4/15 due to high TG of 874 while off lipitor.  and  in 9/15.  and  in 2/16 and 175 and 231 and LDL 96 in 6/16.  and  in 11/16 due to being off lipitor for 1 week but down to TC of 204 and TG of 253 in 4/17.  and  in 9/17.  and  in 3/18.  and  in 10/18 so stopped lipitor and changed to crestor 20 mg but had myalgias so decreased to 1/2 tab in 1/19 and  and  in 4/19 with higher A1c.  and  in 10/19 with lower A1c.  and  in 5/20 with higher A1c. Non- and  in 11/20 with not taking crestor regularly. - cont crestor 20 mg 1/2 tab at night  - check lipids prior to next visit     4. Vitamin D deficiency (268.9) His level was 17.2 in February and up to 29.9 in May and 39.4 in Oct 2012 and 45 in 1/13 and 36 in 5/13. Down to 25.6 in 9/13 but had been off vitamin D and up to 31.9 in 1/14. Currently off vitamin D and level 14.7 in 4/15 and 19.7 in 9/15. Back on since then and 43 in 2/16 and 41 in 6/16 and 39 in 4/17 and 33 in 3/18 and 36 in 4/19 and 39 in 5/20.   Goal > 30.       - cont vitamin D 2000 units daily       - check Vitamin D 25-OH level prior to next visit        Patient Instructions   1) Try to take 10-15 units of novolog before you eat and check your sugar before and 2 hours after you eat. If your sugars are staying under 180, then the novolog was enough to cover your meals. 2) Check your sugar after not eating for 6-8 hours and if your value is under 130 then your tresiba is enough. If dropping under 90, then it's too much and cut back to 100 units. If over 140, it's not enough and try taking 120 units. 3) Take your crestor everyday as your cholesterol level is extremely high and this is due to your liver and not your diet. 4) Please come for a follow up visit on 5/14/21 at 2:10pm in our [place] office.     5) Reach out to me before your next visit and I'll e-mail you a lab slip to take to [facility]        Follow-up and Dispositions    · Return 5/14/21 at 2:10pm.               Copy sent to:  Dr. Rakesh Kumar

## 2020-12-01 RX ORDER — AMLODIPINE BESYLATE 10 MG/1
TABLET ORAL
Qty: 90 TAB | Refills: 3 | Status: SHIPPED | OUTPATIENT
Start: 2020-12-01 | End: 2021-11-29 | Stop reason: SDUPTHER

## 2020-12-01 RX ORDER — HYDROCHLOROTHIAZIDE 25 MG/1
TABLET ORAL
Qty: 90 TAB | Refills: 3 | Status: SHIPPED | OUTPATIENT
Start: 2020-12-01 | End: 2021-11-29 | Stop reason: SDUPTHER

## 2020-12-01 RX ORDER — LISINOPRIL 40 MG/1
TABLET ORAL
Qty: 90 TAB | Refills: 3 | Status: SHIPPED | OUTPATIENT
Start: 2020-12-01 | End: 2021-11-29 | Stop reason: SDUPTHER

## 2020-12-01 RX ORDER — ROSUVASTATIN CALCIUM 10 MG/1
TABLET, COATED ORAL
Qty: 90 TAB | Refills: 3 | Status: SHIPPED | OUTPATIENT
Start: 2020-12-01 | End: 2021-11-29 | Stop reason: SDUPTHER

## 2020-12-01 RX ORDER — PEN NEEDLE, DIABETIC 31 GX3/16"
NEEDLE, DISPOSABLE MISCELLANEOUS
Qty: 300 PEN NEEDLE | Refills: 3 | Status: SHIPPED | OUTPATIENT
Start: 2020-12-01 | End: 2021-11-29 | Stop reason: SDUPTHER

## 2021-04-29 DIAGNOSIS — E10.65 UNCONTROLLED TYPE 1 DIABETES MELLITUS WITH HYPERGLYCEMIA (HCC): ICD-10-CM

## 2021-04-29 DIAGNOSIS — I10 ESSENTIAL HYPERTENSION, BENIGN: ICD-10-CM

## 2021-04-29 DIAGNOSIS — E55.9 VITAMIN D DEFICIENCY: ICD-10-CM

## 2021-04-29 DIAGNOSIS — E78.5 HYPERLIPIDEMIA LDL GOAL <70: ICD-10-CM

## 2021-05-14 ENCOUNTER — VIRTUAL VISIT (OUTPATIENT)
Dept: ENDOCRINOLOGY | Age: 51
End: 2021-05-14
Payer: COMMERCIAL

## 2021-05-14 DIAGNOSIS — I10 ESSENTIAL HYPERTENSION, BENIGN: ICD-10-CM

## 2021-05-14 DIAGNOSIS — E10.65 UNCONTROLLED TYPE 1 DIABETES MELLITUS WITH HYPERGLYCEMIA (HCC): Primary | ICD-10-CM

## 2021-05-14 DIAGNOSIS — E78.5 HYPERLIPIDEMIA LDL GOAL <70: ICD-10-CM

## 2021-05-14 DIAGNOSIS — E55.9 VITAMIN D DEFICIENCY: ICD-10-CM

## 2021-05-14 PROCEDURE — 99214 OFFICE O/P EST MOD 30 MIN: CPT | Performed by: INTERNAL MEDICINE

## 2021-05-14 RX ORDER — GABAPENTIN 300 MG/1
300 CAPSULE ORAL 2 TIMES DAILY
Qty: 180 CAP | Refills: 1 | Status: SHIPPED | OUTPATIENT
Start: 2021-05-14 | End: 2021-11-10

## 2021-05-14 RX ORDER — GABAPENTIN 300 MG/1
300 CAPSULE ORAL 2 TIMES DAILY
Qty: 60 CAP | Refills: 5 | Status: SHIPPED | OUTPATIENT
Start: 2021-05-14 | End: 2021-05-14 | Stop reason: SDUPTHER

## 2021-05-14 NOTE — PROGRESS NOTES
Chief Complaint   Patient presents with   [place] [facility]     [phone] doxy    Other     pcp and pharmacy cofnirmed       **THIS IS A VIRTUAL VISIT VIA A VIDEO SYNCHRONOUS DISCUSSION. PATIENT AGREED TO HAVE THEIR CARE DELIVERED OVER A DOXY. ME VIDEO VISIT IN PLACE OF THEIR REGULARLY SCHEDULED OFFICE VISIT**    History of Present Illness: Diamond Diaz is a 44 y.o. male here for follow up of diabetes. Found that the longer he was taking the cymbalta was having more numbness in his hands and this went away with stopping and with restarting a week later it came back so he stopped this again and has stayed off this. Instead has tried his wife's gabapentin 300 mg caps and has been taking 1 cap bid and this has helped with the pain in his feet and he would like to stay on this dose. Has stayed on tresiba 112 units daily and is taking 16 units of novolog with meals and this works pretty well to keep his sugars mostly in the 100-200 range but does still have some spikes in the 200-300s but not usually over 400. Compliant with BP regimen and has been taking crestor regularly. Didn't get a chance to have labs yet so will go now. Current Outpatient Medications   Medication Sig    Insulin Needles, Disposable, (BD Ultra-Fine Mini Pen Needle) 31 gauge x 3/16\" ndle Use as directed 3 times daily    rosuvastatin (CRESTOR) 10 mg tablet Take 1 whole tab daily--note replaces prior prescription for 20 mg tabs    amLODIPine (NORVASC) 10 mg tablet TAKE 1 TABLET DAILY    lisinopriL (PRINIVIL, ZESTRIL) 40 mg tablet TAKE 1 TABLET DAILY    hydroCHLOROthiazide (HYDRODIURIL) 25 mg tablet TAKE 1 TABLET DAILY    Tresiba FlexTouch U-200 200 unit/mL (3 mL) inpn INJECT 110  UNITS IN THE MORNING    insulin aspart U-100 (NovoLOG Flexpen U-100 Insulin) 100 unit/mL (3 mL) inpn INJECT 10 UNITS SUBCUTANEOUSLY 3 TIMES A DAY PLUS 2 UNITS FOR EVERY 50MG/DL ABOVE 150MG/DL.  MAXIMUM 100 UNITS PER DAY    metoprolol succinate (TOPROL-XL) 25 mg XL tablet Take 1 Tab by mouth daily.  b complex vitamins (B COMPLEX 1) tablet Take 1 Tab by mouth daily.  insulin syringe-needle U-100 1 mL 31 gauge x 15/64\" syrg 1 Syringe by SubCUTAneous route five (5) times daily.  aspirin 81 mg chewable tablet Take 81 mg by mouth daily.  ONETOUCH ULTRA TEST strip Test up to 6 times daily. Dx: 250.03    ONE TOUCH DELICA 33 gauge misc Use as directed up to 6 times daily. Dx: 250.03    cholecalciferol, vitamin D3, (VITAMIN D3) 2,000 unit Tab Take  by mouth daily.  MV-MN/FA/D3/LYCOPENE/LUT/COQ10 (DAILY MULTIVITAMIN PO) Take  by mouth. No current facility-administered medications for this visit. Allergies   Allergen Reactions    Cymbalta [Duloxetine] Other (comments)     Numbness in hands that resolved with stopping        Review of Systems: PER HPI    Physical Examination:  - GENERAL: NCAT, Appears well nourished   - EYES: EOMI, non-icteric, no proptosis   - Ear/Nose/Throat: NCAT, no visible inflammation or masses   - CARDIOVASCULAR: no cyanosis, no visible JVD   - RESPIRATORY: respiratory effort normal without any distress or labored breathing   - MUSCULOSKELETAL: Normal ROM of neck and upper extremities observed   - SKIN: No rash on face  - NEUROLOGIC:  No facial asymmetry (Cranial nerve 7 motor function), No gaze palsy   - PSYCHIATRIC: Normal affect, Normal insight and judgement       Data Reviewed:   - none new for review    Assessment/Plan:     1.  DM w/o complication type I, uncontrolled (250.03) his most recent Hgb A1c was 10.3% in 11/20 stable from 5/20 up from 8.1% in 10/19 down from 9.9% in 4/19 up from 7.7% in 10/18 down from 8.1% in 3/18 up from 7.4% in 9/17 down from 8.3% in 4/17 down from 8.8% in 11/16 up from 8.3% in 6/16 down from 8.6% in 2/16 up from 8.3% in 9/15 down from 9.4% in 4/15 up from 9.3% in 1/14 down from 10.4% in 9/13 up from 10.1% in 5/13 up from 8.1% in 1/13 down from 8.7% in Oct up from 8.3% in May up from 7.7% in February 2012 up from 6.8% in November 2011 down from 9% in September. A1c is above goal of <7.5% given hypoglycemia unawareness. His control sounds better so will await his labs. Will continue gabapentin for neuropathy  - cont gabapentin 300 mg bid  - cont tresiba 112 units in the morning  - cont novolog 16 units before each meal + 2 units for every 50 mg/dl above 150 mg/dl   - check bs up to 6 times per day due to fluctuating blood sugars  - foot exam done 7/18 by podiatry  - optho UTD 2/19  - microalbumin nl 2/12 and 5/12, up to 37 in 5/13, down to 23 in 6/16, up to 47 in 4/17 and 148 in 3/18 and 141 in 4/19, down to 63 in 5/20  - check A1c and cmp and microalbumin now (send to [facility])         2. HTN (hypertension) (401. 9AF) his BP was just above goal < 140/90 at last visit in 11/19. Will focus on weight loss. - cont norvasc 10 mg daily  - cont lisinopril 40 mg daily  - cont hctz 25 mg daily  - monitor home readings       3. Other and unspecified hyperlipidemia (272.4) Given DM and TIA, Goal LDL < 70, non-HDL < 100, and TG < 150. LDL 94 in November 2011 and 97 in February 2012. Up to 106 in May despite switching to lipitor but back to 97 in Oct 2012 and 65 in 1/13 but up to 131 in 5/13 due to diet. Had been off lipitor for 12 days in 9/13 and LDL was unable to be calculated due to TG of 565 and non-HDL of 228.  in 1/14. Couldn't be calculated in 4/15 due to high TG of 874 while off lipitor.  and  in 9/15.  and  in 2/16 and 175 and 231 and LDL 96 in 6/16.  and  in 11/16 due to being off lipitor for 1 week but down to TC of 204 and TG of 253 in 4/17.  and  in 9/17.  and  in 3/18.  and  in 10/18 so stopped lipitor and changed to crestor 20 mg but had myalgias so decreased to 1/2 tab in 1/19 and  and  in 4/19 with higher A1c.  and  in 10/19 with lower A1c.    and  in 5/20 with higher A1c.  Non- and  in 11/20 with not taking crestor regularly. - cont crestor 10 mg at night  - check lipids now prior to next visit     4. Vitamin D deficiency (268.9) His level was 17.2 in February and up to 29.9 in May and 39.4 in Oct 2012 and 45 in 1/13 and 36 in 5/13. Down to 25.6 in 9/13 but had been off vitamin D and up to 31.9 in 1/14. Currently off vitamin D and level 14.7 in 4/15 and 19.7 in 9/15. Back on since then and 43 in 2/16 and 41 in 6/16 and 39 in 4/17 and 33 in 3/18 and 36 in 4/19 and 39 in 5/20. Goal > 30.       - cont vitamin D 2000 units daily       - check Vitamin D 25-OH level now        Patient Instructions   1) I sent gabapentin 300 mg caps to 1 take 1 cap in the morning and at bedtime to your local [facility] for a 30 day supply and to [facility] for a 90 day supply. 2) I just e-mailed the lab slip. It will come from an e-mail [email or web address] and all the e-mail will have is just an attached image with the PDF. Please confirm you received this and check your junk mail just in case you don't see it in your regular e-mail. Thanks. Please fast for your labs. Don't eat anything after midnight. However, drink at least 6-8 oz of water the morning of the lab draw to avoid dehydration and to allow for the tech to find your vein easier. Be prepared to give a urine sample to test for any effect of the diabetes on your kidneys. 3) Please come for a follow up visit on 11/29/21 at 1:50pm in our [place] office. 4) I will e-mail you a lab slip 2 weeks before your next visit.           Follow-up and Dispositions    · Return 11/29/21 at 1:50pm.               Copy sent to:  Dr. Johana Cortés Or

## 2021-05-14 NOTE — PATIENT INSTRUCTIONS
1) I sent gabapentin 300 mg caps to 1 take 1 cap in the morning and at bedtime to your local [facility] for a 30 day supply and to [facility] for a 90 day supply. 2) I just e-mailed the lab slip. It will come from an e-mail [email or web address] and all the e-mail will have is just an attached image with the PDF. Please confirm you received this and check your junk mail just in case you don't see it in your regular e-mail. Thanks. Please fast for your labs. Don't eat anything after midnight. However, drink at least 6-8 oz of water the morning of the lab draw to avoid dehydration and to allow for the tech to find your vein easier. Be prepared to give a urine sample to test for any effect of the diabetes on your kidneys. 3) Please come for a follow up visit on 11/29/21 at 1:50pm in our [place] office. 4) I will e-mail you a lab slip 2 weeks before your next visit.

## 2021-09-07 ENCOUNTER — OFFICE VISIT (OUTPATIENT)
Dept: URGENT CARE | Age: 51
End: 2021-09-07
Payer: COMMERCIAL

## 2021-09-07 VITALS — RESPIRATION RATE: 16 BRPM | HEART RATE: 92 BPM | OXYGEN SATURATION: 98 % | TEMPERATURE: 97.9 F

## 2021-09-07 DIAGNOSIS — Z20.822 ENCOUNTER FOR LABORATORY TESTING FOR COVID-19 VIRUS: Primary | ICD-10-CM

## 2021-09-07 LAB — SARS-COV-2 POC: NEGATIVE

## 2021-09-07 PROCEDURE — 99203 OFFICE O/P NEW LOW 30 MIN: CPT | Performed by: EMERGENCY MEDICINE

## 2021-09-07 PROCEDURE — 87426 SARSCOV CORONAVIRUS AG IA: CPT | Performed by: EMERGENCY MEDICINE

## 2021-09-07 NOTE — PROGRESS NOTES
Pt here with known COVID exposures but no Sx. They are here for screening test. This patient was seen in [facility] at [address] [place] [address] Urgent Care while outdoors at their vehicle due to COVID-19 pandemic with PPE and focused examination in order to decrease community viral transmission        The history is provided by the patient. Past Medical History:   Diagnosis Date    Diabetes ([address]     Other and unspecified hyperlipidemia     Stroke [facility])     TIA September 2011    TIA (transient ischemic attack) Sept 2011    Type I (juvenile type) diabetes mellitus without mention of complication, uncontrolled Age 8    Unspecified essential hypertension     Unspecified vitamin D deficiency 2/27/2012        Past Surgical History:   Procedure Laterality Date    HX CATARACT REMOVAL Left 03/2019         Family History   Problem Relation Age of Onset    Cancer Father         Bone cancer    Heart Attack Maternal Grandmother     Heart Attack Maternal Grandfather     Stroke Maternal Grandfather     Heart Attack Paternal Grandmother     Heart Attack Paternal Grandfather     Diabetes Other         2 nieces with Type 1 diabetes    Heart Disease Neg Hx         Social History     Socioeconomic History    Marital status:      Spouse name: Not on file    Number of children: Not on file    Years of education: Not on file    Highest education level: Not on file   Occupational History    Not on file   Tobacco Use    Smoking status: Never Smoker    Smokeless tobacco: Never Used   Substance and Sexual Activity    Alcohol use: Yes     Alcohol/week: 1.0 standard drinks     Types: 1 Cans of beer per week     Comment: Rare beer    Drug use: No    Sexual activity: Not on file   Other Topics Concern    Not on file   Social History Narrative    Lives in [place] with wife of 13 years. Has a 24 yo son and 20 yo daughter from previous relationship.     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 Likes to dance             Social Determinants of Health     Financial Resource Strain:     Difficulty of Paying Living Expenses:    Food Insecurity:     Worried About Running Out of Food in the Last Year:     [address] in the Last Year:    Transportation Needs:     Lack of Transportation (Medical):  Lack of Transportation (Non-Medical):    Physical Activity:     Days of Exercise per Week:     Minutes of Exercise per Session:    Stress:     Feeling of Stress :    Social Connections:     Frequency of Communication with Friends and Family:     Frequency of Social Gatherings with Friends and Family:     Attends Christian Services:     Active Member of Clubs or Organizations:     Attends Club or Organization Meetings:     Marital Status:    Intimate Partner Violence:     Fear of Current or Ex-Partner:     Emotionally Abused:     Physically Abused:     Sexually Abused: ALLERGIES: Cymbalta [duloxetine]    Review of Systems   Constitutional: Negative for chills, fatigue and fever. HENT: Negative for congestion, rhinorrhea and sore throat. Respiratory: Negative for cough, shortness of breath and wheezing. Cardiovascular: Negative for chest pain. Gastrointestinal: Negative for abdominal pain, diarrhea, nausea and vomiting. Musculoskeletal: Negative for arthralgias and myalgias. Neurological: Negative for headaches. Vitals:    09/07/21 1148   Pulse: 92   Resp: 16   Temp: 97.9 °F (36.6 °C)   SpO2: 98%       Physical Exam  Vitals and nursing note reviewed. Constitutional:       General: He is not in acute distress. Appearance: Normal appearance. He is not ill-appearing or toxic-appearing. HENT:      Nose: No rhinorrhea. Mouth/Throat:      Mouth: Mucous membranes are moist.      Pharynx: Oropharynx is clear. No oropharyngeal exudate or posterior oropharyngeal erythema.    Cardiovascular:      Rate and Rhythm: Normal rate and regular rhythm. Heart sounds: Normal heart sounds. Pulmonary:      Effort: Pulmonary effort is normal. No respiratory distress. Breath sounds: Normal breath sounds. No stridor. No wheezing, rhonchi or rales. Comments: Conversational without cough or dyspnea    Abdominal:      General: Bowel sounds are normal.      Palpations: Abdomen is soft. Tenderness: There is no abdominal tenderness. Neurological:      Mental Status: He is alert and oriented to person, place, and time. Psychiatric:         Mood and Affect: Mood normal.         MDM       ICD-10-CM ICD-9-CM    1. Encounter for laboratory testing for COVID-19 virus  Z20.822 V01.79 AMB POC SARS-COV-2      NOVEL CORONAVIRUS (COVID-19)     No orders of the defined types were placed in this encounter. The patient's condition and possible alternative diagnoses were discussed with the patient and they verbalized understanding. The patient is to follow up with their primary care doctor for continued care. If signs and symptoms persist or become worse or new symptoms develop, the pt is to go immediately to the emergency department. Any new medications that may have been written for should be taken as directed but should always be discussed with the primary care physician and pharmacist. This was communicated to the patient. Pt instructed to quarantine until COVID testing results are back and then duration of quarantine will depend on result, current recommendations and symptoms. The patient is to get immediate re-evaluation for any new or worsening symptoms. They are to quarantine from other household members. It was recommended they stay hydrated and practice deep breathing exercises.        Results for orders placed or performed in visit on 09/07/21   AMB POC SARS-COV-2   Result Value Ref Range    SARS-COV-2 POC Negative Negative         Procedures

## 2021-09-09 LAB
SARS-COV-2, NAA 2 DAY TAT: NORMAL
SARS-COV-2, NAA: NOT DETECTED

## 2021-09-10 ENCOUNTER — OFFICE VISIT (OUTPATIENT)
Dept: URGENT CARE | Age: 51
End: 2021-09-10
Payer: COMMERCIAL

## 2021-09-10 VITALS — HEART RATE: 88 BPM | TEMPERATURE: 98 F | RESPIRATION RATE: 12 BRPM | OXYGEN SATURATION: 100 %

## 2021-09-10 DIAGNOSIS — R52 BODY ACHES: ICD-10-CM

## 2021-09-10 DIAGNOSIS — R05.9 COUGH: ICD-10-CM

## 2021-09-10 DIAGNOSIS — Z20.822 EXPOSURE TO COVID-19 VIRUS: Primary | ICD-10-CM

## 2021-09-10 PROCEDURE — 99212 OFFICE O/P EST SF 10 MIN: CPT | Performed by: FAMILY MEDICINE

## 2021-09-10 NOTE — PROGRESS NOTES
This patient was seen at [address] [place] [address] [facility]gent [facility] while in their vehicle due to COVID-19 pandemic with PPE and focused examination in order to decrease community viral transmission. The patient/guardian gave verbal consent to treat. Fadia Muñoz is a 44 y.o. male who presents with cough, body aches and fever since yesterday. Was exposed to COVID-19 by wife. Denies SOB, N/V/D. Eating/drinking well. The history is provided by the patient. Past Medical History:   Diagnosis Date    Diabetes ([address]     Other and unspecified hyperlipidemia     Stroke [facility])     TIA September 2011    TIA (transient ischemic attack) Sept 2011    Type I (juvenile type) diabetes mellitus without mention of complication, uncontrolled Age 8    Unspecified essential hypertension     Unspecified vitamin D deficiency 2/27/2012        Past Surgical History:   Procedure Laterality Date    HX CATARACT REMOVAL Left 03/2019         Family History   Problem Relation Age of Onset    Cancer Father         Bone cancer    Heart Attack Maternal Grandmother     Heart Attack Maternal Grandfather     Stroke Maternal Grandfather     Heart Attack Paternal Grandmother     Heart Attack Paternal Grandfather     Diabetes Other         2 nieces with Type 1 diabetes    Heart Disease Neg Hx         Social History     Socioeconomic History    Marital status:      Spouse name: Not on file    Number of children: Not on file    Years of education: Not on file    Highest education level: Not on file   Occupational History    Not on file   Tobacco Use    Smoking status: Never Smoker    Smokeless tobacco: Never Used   Substance and Sexual Activity    Alcohol use:  Yes     Alcohol/week: 1.0 standard drinks     Types: 1 Cans of beer per week     Comment: Rare beer    Drug use: No    Sexual activity: Not on file   Other Topics Concern    Not on file   Social History Narrative    Lives in [place] with wife of 15 years. Has a 24 yo son and 20 yo daughter from previous relationship. 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 Likes to dance             Social Determinants of Health     Financial Resource Strain:     Difficulty of Paying Living Expenses:    Food Insecurity:     Worried About Running Out of Food in the Last Year:     [address] in the Last Year:    Transportation Needs:     Lack of Transportation (Medical):  Lack of Transportation (Non-Medical):    Physical Activity:     Days of Exercise per Week:     Minutes of Exercise per Session:    Stress:     Feeling of Stress :    Social Connections:     Frequency of Communication with Friends and Family:     Frequency of Social Gatherings with Friends and Family:     Attends [employer] Services:     Active Member of Clubs or Organizations:     Attends Club or Organization Meetings:     Marital Status:    Intimate Partner Violence:     Fear of Current or Ex-Partner:     Emotionally Abused:     Physically Abused:     Sexually Abused: ALLERGIES: Cymbalta [duloxetine]    Review of Systems   Constitutional: Positive for fever. Negative for activity change and appetite change. Respiratory: Positive for cough. Negative for shortness of breath. Gastrointestinal: Negative for diarrhea, nausea and vomiting. Musculoskeletal: Positive for myalgias. Vitals:    09/10/21 0831   Pulse: 88   Resp: 12   Temp: 98 °F (36.7 °C)   SpO2: 100%       Physical Exam  Vitals and nursing note reviewed. Constitutional:       General: He is not in acute distress. Appearance: He is well-developed. He is not diaphoretic. Pulmonary:      Effort: Pulmonary effort is normal. No respiratory distress. Breath sounds: Normal breath sounds. No stridor. No wheezing, rhonchi or rales. Neurological:      Mental Status: He is alert.    Psychiatric:         Behavior: Behavior normal. Thought Content: Thought content normal.         Judgment: Judgment normal.         MDM    ICD-10-CM ICD-9-CM   1. Exposure to COVID-19 virus  Z20.822 V01.79   2. Cough  R05 786.2   3. Body aches  R52 780.96       Orders Placed This Encounter    NOVEL CORONAVIRUS (COVID-19)     Scheduling Instructions:      1) Due to current limited availability of the COVID-19 PCR test, tests will be prioritized and may not be completed.              2) Order only if the test result will change clinical management or necessary for a return to mission-critical employment decision.              3) Print and instruct patient to adhere to CDC home isolation program. (Link Above)              4) Set up or refer patient for a monitoring program.              5) Have patient sign up for and leverage MyChart (if not previously done). Order Specific Question:   Is this test for diagnosis or screening? Answer:   Diagnosis of ill patient     Order Specific Question:   Symptomatic for COVID-19 as defined by CDC? Answer:   Yes     Order Specific Question:   Date of Symptom Onset     Answer:   9/9/2021     Order Specific Question:   Hospitalized for COVID-19? Answer:   No     Order Specific Question:   Admitted to ICU for COVID-19? Answer:   No     Order Specific Question:   Employed in healthcare setting? Answer:   Unknown     Order Specific Question:   Resident in a congregate (group) care setting? Answer:   No     Order Specific Question:   Previously tested for COVID-19? Answer:   Yes        Quarantine x 10 days from sx onset, await PCR results  Deep breathing exercises, ambulation  Tylenol prn  Increase fluids with electrolytes if decreased PO intake    MyChart: active  Provider will call if PCR COVID-19 test is positive     If signs and symptoms become worse the pt is to go to the ER.          Procedures

## 2021-09-12 LAB
SARS-COV-2, NAA 2 DAY TAT: NORMAL
SARS-COV-2, NAA: DETECTED

## 2021-11-10 DIAGNOSIS — E10.65 UNCONTROLLED TYPE 1 DIABETES MELLITUS WITH HYPERGLYCEMIA (HCC): ICD-10-CM

## 2021-11-10 RX ORDER — INSULIN DEGLUDEC INJECTION 200 U/ML
INJECTION, SOLUTION SUBCUTANEOUS
Qty: 54 ML | Refills: 3 | Status: SHIPPED | OUTPATIENT
Start: 2021-11-10 | End: 2022-10-24

## 2021-11-10 RX ORDER — INSULIN ASPART 100 [IU]/ML
INJECTION, SOLUTION INTRAVENOUS; SUBCUTANEOUS
Qty: 90 ML | Refills: 3 | Status: SHIPPED | OUTPATIENT
Start: 2021-11-10

## 2021-11-10 RX ORDER — GABAPENTIN 300 MG/1
CAPSULE ORAL
Qty: 180 CAPSULE | Refills: 1 | Status: SHIPPED | OUTPATIENT
Start: 2021-11-10 | End: 2022-05-24

## 2021-11-23 LAB
25(OH)D3 SERPL-MCNC: 27 NG/ML (ref 30–100)
ALB/GLOBRATIO, 58C: 1.5 (CALC) (ref 1–2.5)
ALBUMIN SERPL-MCNC: 4.4 G/DL (ref 3.6–5.1)
ALKALINE PHOSPHATASE, TOTAL, 25002000: 115 U/L (ref 35–144)
ALT SERPL-CCNC: 15 U/L (ref 9–46)
AST SERPL W P-5'-P-CCNC: 19 U/L (ref 10–35)
BILIRUB SERPL-MCNC: 0.7 MG/DL (ref 0.2–1.2)
BUN SERPL-MCNC: 29 MG/DL (ref 7–25)
BUN/CREATININE RATIO,BUCR: 25 (CALC) (ref 6–22)
CALCIUM SERPL-MCNC: 9.9 MG/DL (ref 8.6–10.3)
CHLORIDE SERPL-SCNC: 94 MMOL/L (ref 98–110)
CHOL/HDL RATIO,CHHDX: 6 (CALC)
CHOLEST SERPL-MCNC: 228 MG/DL
CO2 SERPL-SCNC: 31 MMOL/L (ref 20–32)
CREAT SERPL-MCNC: 1.18 MG/DL (ref 0.7–1.33)
CREATININE URINE,9612018: 60 MG/DL (ref 20–320)
EAG (MG/DL),9916804: 243 (CALC)
EAG (MMOL/L),9916805: 13.5 (CALC)
GLOBULIN,GLOB: 2.9 G/DL (CALC) (ref 1.9–3.7)
GLUCOSE SERPL-MCNC: 258 MG/DL (ref 65–99)
HBA1C MFR BLD HPLC: 10.1 % OF TOTAL HGB
HDLC SERPL-MCNC: 38 MG/DL
LDL-CHOLESTEROL: 134 MG/DL (CALC)
MICROALBUMIN,URINE RANDOM 140054: 11.1 MG/DL
MICROALBUMIN/CREAT RATIO: 185 MCG/MG CREAT
NON-HDL CHOLESTEROL, 011976: 190 MG/DL (CALC)
POTASSIUM SERPL-SCNC: 4.4 MMOL/L (ref 3.5–5.3)
PROT SERPL-MCNC: 7.3 G/DL (ref 6.1–8.1)
SODIUM SERPL-SCNC: 134 MMOL/L (ref 135–146)
TRIGL SERPL-MCNC: 363 MG/DL (ref ?–150)

## 2021-11-29 ENCOUNTER — OFFICE VISIT (OUTPATIENT)
Dept: ENDOCRINOLOGY | Age: 51
End: 2021-11-29
Payer: COMMERCIAL

## 2021-11-29 VITALS
SYSTOLIC BLOOD PRESSURE: 159 MMHG | WEIGHT: 220.2 LBS | BODY MASS INDEX: 37.59 KG/M2 | HEIGHT: 64 IN | DIASTOLIC BLOOD PRESSURE: 78 MMHG | HEART RATE: 94 BPM

## 2021-11-29 DIAGNOSIS — E10.65 UNCONTROLLED TYPE 1 DIABETES MELLITUS WITH HYPERGLYCEMIA (HCC): Primary | ICD-10-CM

## 2021-11-29 DIAGNOSIS — E78.5 HYPERLIPIDEMIA LDL GOAL <70: ICD-10-CM

## 2021-11-29 DIAGNOSIS — I10 ESSENTIAL HYPERTENSION, BENIGN: ICD-10-CM

## 2021-11-29 DIAGNOSIS — E55.9 VITAMIN D DEFICIENCY: ICD-10-CM

## 2021-11-29 PROCEDURE — 99214 OFFICE O/P EST MOD 30 MIN: CPT | Performed by: INTERNAL MEDICINE

## 2021-11-29 RX ORDER — METOPROLOL SUCCINATE 50 MG/1
50 TABLET, EXTENDED RELEASE ORAL DAILY
Qty: 90 TABLET | Refills: 3 | Status: SHIPPED | OUTPATIENT
Start: 2021-11-29 | End: 2021-11-29 | Stop reason: SDUPTHER

## 2021-11-29 RX ORDER — ROSUVASTATIN CALCIUM 10 MG/1
TABLET, COATED ORAL
Qty: 90 TABLET | Refills: 3 | Status: SHIPPED | OUTPATIENT
Start: 2021-11-29

## 2021-11-29 RX ORDER — METOPROLOL SUCCINATE 50 MG/1
50 TABLET, EXTENDED RELEASE ORAL DAILY
Qty: 90 TABLET | Refills: 3 | Status: SHIPPED | OUTPATIENT
Start: 2021-11-29 | End: 2021-12-10

## 2021-11-29 RX ORDER — PEN NEEDLE, DIABETIC 31 GX3/16"
NEEDLE, DISPOSABLE MISCELLANEOUS
Qty: 300 PEN NEEDLE | Refills: 3 | Status: SHIPPED | OUTPATIENT
Start: 2021-11-29

## 2021-11-29 RX ORDER — LISINOPRIL 40 MG/1
TABLET ORAL
Qty: 90 TABLET | Refills: 3 | Status: SHIPPED | OUTPATIENT
Start: 2021-11-29

## 2021-11-29 RX ORDER — AMLODIPINE BESYLATE 10 MG/1
TABLET ORAL
Qty: 90 TABLET | Refills: 3 | Status: SHIPPED | OUTPATIENT
Start: 2021-11-29

## 2021-11-29 RX ORDER — HYDROCHLOROTHIAZIDE 25 MG/1
TABLET ORAL
Qty: 90 TABLET | Refills: 3 | Status: SHIPPED | OUTPATIENT
Start: 2021-11-29

## 2021-11-29 NOTE — PROGRESS NOTES
Chief Complaint   Patient presents with    Diabetes     pcp and pharmacy confirmed    Other     eye exam is due     History of Present Illness: Car Zaragoza is a 44 y.o. male here for follow up of diabetes. Neuropathy is still controlled on 1 cap bid of gabapentin. Still taking tresiba 112 units in the morning and novolog 16 units with meals. Was diagnosed with COVID in 9/21 and did take a course of prednisone for about 2-3 weeks. Doesn't recall what his sugars were while on the steroids. No issue with low sugars. Does sometimes take 20 of novolog if he's eating more carbs. Weight up 7 lbs since last visit in person in 11/19. Compliant with BP regimen and hasn't checked any home readings. May only be taking 1000 of D3 daily. Current Outpatient Medications   Medication Sig    gabapentin (NEURONTIN) 300 mg capsule TAKE 1 CAPSULE TWICE DAILY. MAXIMUM DAILY AMOUNT: 600MG    insulin aspart U-100 (NovoLOG Flexpen U-100 Insulin) 100 unit/mL (3 mL) inpn INJECT 10 UNITS SUBCUTANEOUSLY 3 TIMES A DAY PLUS 2 UNITS FOR EVERY 50MG/DECILITER ABOVE 150MG/DECILITER. MAXIMUM 100 UNITS PER DAY    Tresiba FlexTouch U-200 200 unit/mL (3 mL) inpn pen INJECT 110  UNITS IN THE MORNING    Insulin Needles, Disposable, (BD Ultra-Fine Mini Pen Needle) 31 gauge x 3/16\" ndle Use as directed 3 times daily    rosuvastatin (CRESTOR) 10 mg tablet Take 1 whole tab daily--note replaces prior prescription for 20 mg tabs    amLODIPine (NORVASC) 10 mg tablet TAKE 1 TABLET DAILY    lisinopriL (PRINIVIL, ZESTRIL) 40 mg tablet TAKE 1 TABLET DAILY    hydroCHLOROthiazide (HYDRODIURIL) 25 mg tablet TAKE 1 TABLET DAILY    metoprolol succinate (TOPROL-XL) 25 mg XL tablet Take 1 Tab by mouth daily.  b complex vitamins (B COMPLEX 1) tablet Take 1 Tab by mouth daily.  insulin syringe-needle U-100 1 mL 31 gauge x 15/64\" syrg 1 Syringe by SubCUTAneous route five (5) times daily.     aspirin 81 mg chewable tablet Take 81 mg by mouth daily.    ONETOUCH ULTRA TEST strip Test up to 6 times daily. Dx: 250.03    ONE TOUCH DELICA 33 gauge misc Use as directed up to 6 times daily. Dx: 250.03    cholecalciferol, vitamin D3, (VITAMIN D3) 2,000 unit Tab Take  by mouth daily.  MV-MN/FA/D3/LYCOPENE/LUT/COQ10 (DAILY MULTIVITAMIN PO) Take  by mouth. No current facility-administered medications for this visit. Allergies   Allergen Reactions    Cymbalta [Duloxetine] Other (comments)     Numbness in hands that resolved with stopping     Review of Systems: PER HPI    Physical Examination:  Blood pressure (!) 159/78, pulse 94, height 5' 4\" (1.626 m), weight 220 lb 3.2 oz (99.9 kg).   - General: pleasant, no distress, good eye contact   - Neck: (+) left carotid bruit  - Cardiovascular: regular, normal rate, nl s1 and s2, no m/r/g,   - Respiratory: clear bilaterally  - Integumentary: no edema,   - Psychiatric: normal mood and affect    Diabetic foot exam:     Left Foot:   Visual Exam: callous - mild   Pulse DP: 2+ (normal)   Filament test: reduced sensation    Vibratory sensation: diminished      Right Foot:   Visual Exam: callous - mild   Pulse DP: 2+ (normal)   Filament test: reduced sensation    Vibratory sensation: diminished        Data Reviewed:   Component      Latest Ref Rng & Units 11/22/2021   Glucose      65 - 99 mg/dL 258 (H)   BUN      7 - 25 mg/dL 29 (H)   Creatinine      0.70 - 1.33 mg/dL 1.18   GFR est non-AA      > OR = 60 mL/min/1.73m2 71   GFR est AA      > OR = 60 mL/min/1.73m2 82   BUN/Creatinine ratio      6 - 22 (calc) 25 (H)   Sodium      135 - 146 mmol/L 134 (L)   Potassium      3.5 - 5.3 mmol/L 4.4   Chloride      98 - 110 mmol/L 94 (L)   CO2      20 - 32 mmol/L 31   Calcium      8.6 - 10.3 mg/dL 9.9   Protein, total      6.1 - 8.1 g/dL 7.3   Albumin      3.6 - 5.1 g/dL 4.4   Globulin      1.9 - 3.7 g/dL (calc) 2.9   ALB/GLOBRATIO      1.0 - 2.5 (calc) 1.5   Bilirubin, total      0.2 - 1.2 mg/dL 0.7   Alkaline Phosphatase, total      35 - 144 U/L 115   AST      10 - 35 U/L 19   ALT      9 - 46 U/L 15   Cholesterol, total      <200 mg/dL 228 (H)   HDL Cholesterol      > OR = 40 mg/dL 38 (L)   Triglyceride      <150 mg/dL 363 (H)   LDL-CHOLESTEROL      mg/dL (calc) 134 (H)   Cholesterol/HDL ratio      <5.0 (calc) 6.0 (H)   Non-HDL Cholesterol      <130 mg/dL (calc) 190 (H)   Creatinine Urine      20 - 320 mg/dL 60   Microalbumin, urine      mg/dL 11.1   Microalbumin/Creat Ratio      <30 mcg/mg creat 185 (H)   Hemoglobin A1c, (calculated)      <5.7 % of total Hgb 10.1 (H)   eAG (mg/dL)      (calc) 243   eAG (mmol/L)      (calc) 13.5   VITAMIN D, 25-HYDROXY      30 - 100 ng/mL 27 (L)       Assessment/Plan:     1. DM w/o complication type I, uncontrolled (250.03) his most recent Hgb A1c was 10.1% in 11/21 down from 10.3% in 11/20 stable from 5/20 up from 8.1% in 10/19 down from 9.9% in 4/19 up from 7.7% in 10/18 down from 8.1% in 3/18 up from 7.4% in 9/17 down from 8.3% in 4/17 down from 8.8% in 11/16 up from 8.3% in 6/16 down from 8.6% in 2/16 up from 8.3% in 9/15 down from 9.4% in 4/15 up from 9.3% in 1/14 down from 10.4% in 9/13 up from 10.1% in 5/13 up from 8.1% in 1/13 down from 8.7% in Oct up from 8.3% in May up from 7.7% in February 2012 up from 6.8% in November 2011 down from 9% in September. A1c is above goal of <7.5% given hypoglycemia unawareness. His control is similar so will focus on after meal readings to see if he needs more novolog with food as he doesn't feel he'll be able to carb count successfully.   - cont gabapentin 300 mg bid  - cont tresiba 112 units in the morning  - cont novolog 16 units before each meal + 2 units for every 50 mg/dl above 150 mg/dl   - check bs up to 6 times per day due to fluctuating blood sugars  - foot exam done 11/21  - optho UTD 2/19  - microalbumin nl 2/12 and 5/12, up to 37 in 5/13, down to 23 in 6/16, up to 47 in 4/17 and 148 in 3/18 and 141 in 4/19, down to 63 in 5/20, up to 185 in 11/21  - check A1c and cmp and microalbumin prior to next visit (send to [id number] [facility] [place])         2. HTN (hypertension) (401. 9AF) his BP was above goal < 140/90 so increased toprol  - cont norvasc 10 mg daily  - cont lisinopril 40 mg daily  - cont hctz 25 mg daily  - increase toprol XL to 50 mg daily  - monitor home readings       3. Other and unspecified hyperlipidemia (272.4) Given DM and TIA, Goal LDL < 70, non-HDL < 100, and TG < 150. LDL 94 in November 2011 and 97 in February 2012. Up to 106 in May despite switching to lipitor but back to 97 in Oct 2012 and 65 in 1/13 but up to 131 in 5/13 due to diet. Had been off lipitor for 12 days in 9/13 and LDL was unable to be calculated due to TG of 565 and non-HDL of 228.  in 1/14. Couldn't be calculated in 4/15 due to high TG of 874 while off lipitor.  and  in 9/15.  and  in 2/16 and 175 and 231 and LDL 96 in 6/16.  and  in 11/16 due to being off lipitor for 1 week but down to TC of 204 and TG of 253 in 4/17.  and  in 9/17.  and  in 3/18.  and  in 10/18 so stopped lipitor and changed to crestor 20 mg but had myalgias so decreased to 1/2 tab in 1/19 and  and  in 4/19 with higher A1c.  and  in 10/19 with lower A1c.  and  in 5/20 with higher A1c. Non- and  in 11/20 with not taking crestor regularly.  and  and non- in 11/21.  - cont crestor 10 mg at night  - check lipids prior to next visit     4. Vitamin D deficiency (268.9) His level was 17.2 in February and up to 29.9 in May and 39.4 in Oct 2012 and 45 in 1/13 and 36 in 5/13. Down to 25.6 in 9/13 but had been off vitamin D and up to 31.9 in 1/14. Currently off vitamin D and level 14.7 in 4/15 and 19.7 in 9/15. Back on since then and 43 in 2/16 and 41 in 6/16 and 39 in 4/17 and 33 in 3/18 and 36 in 4/19 and 39 in 5/20 on 2000 units daily.   Down to 32 in 11/21 but may only be taking 1000 units daily. Goal > 30.       - cont vitamin D 2000 units daily       - check Vitamin D 25-OH level prior to next visit        Patient Instructions   1) Your vitamin D is 27 and goal is over 30. Check what dose you are on and if you are taking 1000 daily then go to 2000 daily but if on 2000 daily then go to 4000 daily. 2) Your A1c is still over 10% likely due to spikes after eating. Do your best to focus on the meals that are causing you to spike over 180 when checked 2 hours after a meal and limit your portions of these foods or proactively take 4-6 more units of novolog the next time you eat a similar meal.    3) Your cholesterol has come down with getting the crestor more regularly. 4) I increased your metoprolol to 50 mg daily and sent this to [facility]. Take 2 of the 25 mg tabs once daily until these run out and then take 1 of the 50 mg tabs once daily. Follow-up and Dispositions    · Return in about 6 months (around 5/29/2022).                Copy sent to:  Preston Sanders MD

## 2021-11-29 NOTE — PATIENT INSTRUCTIONS
1) Your vitamin D is 27 and goal is over 30. Check what dose you are on and if you are taking 1000 daily then go to 2000 daily but if on 2000 daily then go to 4000 daily. 2) Your A1c is still over 10% likely due to spikes after eating. Do your best to focus on the meals that are causing you to spike over 180 when checked 2 hours after a meal and limit your portions of these foods or proactively take 4-6 more units of novolog the next time you eat a similar meal.    3) Your cholesterol has come down with getting the crestor more regularly. 4) I increased your metoprolol to 50 mg daily and sent this to [facility]. Take 2 of the 25 mg tabs once daily until these run out and then take 1 of the 50 mg tabs once daily.

## 2021-12-10 RX ORDER — METOPROLOL SUCCINATE 50 MG/1
25 TABLET, EXTENDED RELEASE ORAL DAILY
Qty: 90 TABLET | Refills: 3
Start: 2021-12-10 | End: 2022-05-02

## 2022-03-18 PROBLEM — J18.9 BILATERAL PNEUMONIA: Status: ACTIVE | Noted: 2019-11-29

## 2022-03-18 PROBLEM — N17.9 AKI (ACUTE KIDNEY INJURY) (HCC): Status: ACTIVE | Noted: 2019-11-29

## 2022-03-19 PROBLEM — R65.20 SEVERE SEPSIS (HCC): Status: ACTIVE | Noted: 2019-11-29

## 2022-03-19 PROBLEM — E66.01 SEVERE OBESITY (HCC): Status: ACTIVE | Noted: 2018-10-30

## 2022-03-19 PROBLEM — A41.9 SEVERE SEPSIS (HCC): Status: ACTIVE | Noted: 2019-11-29

## 2022-04-28 LAB
25(OH)D3 SERPL-MCNC: 35 NG/ML (ref 30–100)
ALB/GLOBRATIO, 58C: 1.7 (CALC) (ref 1–2.5)
ALBUMIN SERPL-MCNC: 4.6 G/DL (ref 3.6–5.1)
ALKALINE PHOSPHATASE, TOTAL, 25002000: 102 U/L (ref 35–144)
ALT SERPL-CCNC: 13 U/L (ref 9–46)
AST SERPL W P-5'-P-CCNC: 23 U/L (ref 10–35)
BILIRUB SERPL-MCNC: 0.5 MG/DL (ref 0.2–1.2)
BUN SERPL-MCNC: 47 MG/DL (ref 7–25)
BUN/CREATININE RATIO,BUCR: 34 (CALC) (ref 6–22)
CALCIUM SERPL-MCNC: 10.1 MG/DL (ref 8.6–10.3)
CHLORIDE SERPL-SCNC: 98 MMOL/L (ref 98–110)
CHOL/HDL RATIO,CHHDX: 6.5 (CALC)
CHOLEST SERPL-MCNC: 248 MG/DL
CO2 SERPL-SCNC: 27 MMOL/L (ref 20–32)
CREAT SERPL-MCNC: 1.39 MG/DL (ref 0.7–1.33)
CREATININE URINE,9612018: 67 MG/DL (ref 20–320)
EAG (MG/DL),9916804: 214 MG/DL
EAG (MMOL/L),9916805: 11.9 MMOL/L
GLOBULIN,GLOB: 2.7 G/DL (CALC) (ref 1.9–3.7)
GLUCOSE SERPL-MCNC: 46 MG/DL (ref 65–99)
HBA1C MFR BLD HPLC: 9.1 % OF TOTAL HGB
HDLC SERPL-MCNC: 38 MG/DL
LDL-CHOLESTEROL: 164 MG/DL (CALC)
MICROALBUMIN,URINE RANDOM 140054: 11.3 MG/DL
MICROALBUMIN/CREAT RATIO: 169 MCG/MG CREAT
NON-HDL CHOLESTEROL, 011976: 210 MG/DL (CALC)
POTASSIUM SERPL-SCNC: 4.2 MMOL/L (ref 3.5–5.3)
PROT SERPL-MCNC: 7.3 G/DL (ref 6.1–8.1)
SODIUM SERPL-SCNC: 136 MMOL/L (ref 135–146)
TRIGL SERPL-MCNC: 280 MG/DL (ref ?–150)

## 2022-05-02 ENCOUNTER — OFFICE VISIT (OUTPATIENT)
Dept: ENDOCRINOLOGY | Age: 52
End: 2022-05-02
Payer: COMMERCIAL

## 2022-05-02 VITALS
SYSTOLIC BLOOD PRESSURE: 128 MMHG | HEIGHT: 64 IN | HEART RATE: 90 BPM | BODY MASS INDEX: 37.76 KG/M2 | WEIGHT: 221.2 LBS | DIASTOLIC BLOOD PRESSURE: 63 MMHG

## 2022-05-02 DIAGNOSIS — E78.5 HYPERLIPIDEMIA LDL GOAL <70: ICD-10-CM

## 2022-05-02 DIAGNOSIS — E55.9 VITAMIN D DEFICIENCY: ICD-10-CM

## 2022-05-02 DIAGNOSIS — E10.65 UNCONTROLLED TYPE 1 DIABETES MELLITUS WITH HYPERGLYCEMIA (HCC): Primary | ICD-10-CM

## 2022-05-02 DIAGNOSIS — I10 ESSENTIAL HYPERTENSION, BENIGN: ICD-10-CM

## 2022-05-02 PROCEDURE — 3046F HEMOGLOBIN A1C LEVEL >9.0%: CPT | Performed by: INTERNAL MEDICINE

## 2022-05-02 PROCEDURE — 99214 OFFICE O/P EST MOD 30 MIN: CPT | Performed by: INTERNAL MEDICINE

## 2022-05-02 NOTE — PATIENT INSTRUCTIONS
1) Your Hemoglobin A1c (3 month test of blood sugar) has dropped from 10.1% to 9.1% which is the best since 10/19. 2) If you continue a low carb diet, If you have 2 or more readings under 80 in a week, then decrease your tresiba to 100 units and if needed further decrease by 10 units every week to get to the lowest dose that keeps you .      3) Your blood pressure is controlled off the metoprolol so stay off this. 4) Your cholesterol was higher due to diet over the holidays but also due to taking 1/2 of the 10 mg tab and you should be on one whole tab daily. 5) Your vitamin D level is 35 which is normal.  Goal is over 30. Please continue to take your current dose of vitamin D 4000 units daily to keep your levels at goal.    6) Microalbumin/creatinine ratio is a marker of the amount of protein in your urine. Goal is less than 30. Your value is abnormal but improved. This indicates that your kidneys are being less affected by your uncontrolled diabetes and/or blood pressure. 7) ALT and AST are markers of liver function. Your values are normal.    8) BUN and creatinine are markers of kidney function. Your values are slightly abnormal. Your creatinine (kidney test) was slightly high due to mild dehydration. Drink at least 4 8oz glasses of water everyday to stay hydrated to prevent your creatinine level from going higher.

## 2022-05-02 NOTE — PROGRESS NOTES
Chief Complaint   Patient presents with    Diabetes     eye exam is due    Other     pcp and pharmacy confirmed     History of Present Illness: Marcus Sanz is a 46 y.o. male here for follow up of diabetes. Weight up 1 lbs since last visit in 11/21. Was in [place] for 2 weeks for his 25th anniversary earlier this month and he felt bad as he had eaten a lot and gained some weight so when he came on 4/23/22 he got more serious with his diet and over the past 10 days has been eating low carb and has lost 5 lbs. He wrote to me in December that it turns out he had not been taking the metoprolol prior to his last visit as when he start taking this, had more hand cramping and bloating so he stopped and his BP is controlled today off this. Also did double his vitamin D to 4000 units daily. Did have a low sugar the morning of the lab draw as he was under stress repairing a flat tire but has not had low sugars at home on the 112 units of tresiba and immediately ate after the lab draw. Has only been taking about 5 units if he has some fruit for the novolog. Had been taking as much as 20-22 units of novolog to cover his evening food that was higher in carbs. After last visit when I changed his crestor to 10 mg tabs, he thinks he has been taking 1/2 tab daily as he was used to taking 1/2 of the 20 mg tabs. Current Outpatient Medications   Medication Sig    Insulin Needles, Disposable, (BD Ultra-Fine Mini Pen Needle) 31 gauge x 3/16\" ndle Use as directed 3 times daily    rosuvastatin (CRESTOR) 10 mg tablet Take 1 whole tab daily--note replaces prior prescription for 20 mg tabs    amLODIPine (NORVASC) 10 mg tablet TAKE 1 TABLET DAILY    lisinopriL (PRINIVIL, ZESTRIL) 40 mg tablet TAKE 1 TABLET DAILY    hydroCHLOROthiazide (HYDRODIURIL) 25 mg tablet TAKE 1 TABLET DAILY    gabapentin (NEURONTIN) 300 mg capsule TAKE 1 CAPSULE TWICE DAILY. MAXIMUM DAILY AMOUNT: 600MG    insulin aspart U-100 (NovoLOG Flexpen U-100 Insulin) 100 unit/mL (3 mL) inpn INJECT 10 UNITS SUBCUTANEOUSLY 3 TIMES A DAY PLUS 2 UNITS FOR EVERY 50MG/DECILITER ABOVE 150MG/DECILITER. MAXIMUM 100 UNITS PER DAY    Tresiba FlexTouch U-200 200 unit/mL (3 mL) inpn pen INJECT 110  UNITS IN THE MORNING    b complex vitamins (B COMPLEX 1) tablet Take 1 Tab by mouth daily.  insulin syringe-needle U-100 1 mL 31 gauge x 15/64\" syrg 1 Syringe by SubCUTAneous route five (5) times daily.  aspirin 81 mg chewable tablet Take 81 mg by mouth daily.  ONETOUCH ULTRA TEST strip Test up to 6 times daily. Dx: 250.03    ONE TOUCH DELICA 33 gauge misc Use as directed up to 6 times daily. Dx: 250.03    cholecalciferol, vitamin D3, (VITAMIN D3) 2,000 unit Tab Take  by mouth daily.  MV-MN/FA/D3/LYCOPENE/LUT/COQ10 (DAILY MULTIVITAMIN PO) Take  by mouth.  metoprolol succinate (TOPROL-XL) 50 mg XL tablet Take 0.5 Tablets by mouth daily. Delete the 25 mg dose from profile--Dose change 12/10/21--updated med list--did not send prescription to the pharmacy (Patient not taking: Reported on 5/2/2022)     No current facility-administered medications for this visit. Allergies   Allergen Reactions    Cymbalta [Duloxetine] Other (comments)     Numbness in hands that resolved with stopping     Review of Systems: PER HPI    Physical Examination:  Blood pressure 128/63, pulse 90, height 5' 4\" (1.626 m), weight 221 lb 3.2 oz (100.3 kg).   - General: pleasant, no distress, good eye contact   - Neck: no carotid bruits  - Cardiovascular: regular, normal rate, nl s1 and s2, no m/r/g,   - Respiratory: clear bilaterally  - Integumentary: no edema,   - Psychiatric: normal mood and affect    Data Reviewed:   Component      Latest Ref Rng & Units 4/27/2022   Glucose      65 - 99 mg/dL 46 (L)   BUN      7 - 25 mg/dL 47 (H)   Creatinine      0.70 - 1.33 mg/dL 1.39 (H)   GFR est non-AA      > OR = 60 mL/min/1.73m2 57 (L)   GFR est AA      > OR = 60 mL/min/1.73m2 67   BUN/Creatinine ratio      6 - 22 (calc) 34 (H)   Sodium      135 - 146 mmol/L 136   Potassium      3.5 - 5.3 mmol/L 4.2   Chloride      98 - 110 mmol/L 98   CO2      20 - 32 mmol/L 27   Calcium      8.6 - 10.3 mg/dL 10.1   Protein, total      6.1 - 8.1 g/dL 7.3   Albumin      3.6 - 5.1 g/dL 4.6   Globulin      1.9 - 3.7 g/dL (calc) 2.7   ALB/GLOBRATIO      1.0 - 2.5 (calc) 1.7   Bilirubin, total      0.2 - 1.2 mg/dL 0.5   Alkaline Phosphatase, total      35 - 144 U/L 102   AST      10 - 35 U/L 23   ALT      9 - 46 U/L 13   Cholesterol, total      <200 mg/dL 248 (H)   HDL Cholesterol      > OR = 40 mg/dL 38 (L)   Triglyceride      <150 mg/dL 280 (H)   LDL-CHOLESTEROL      mg/dL (calc) 164 (H)   Cholesterol/HDL ratio      <5.0 (calc) 6.5 (H)   Non-HDL Cholesterol      <130 mg/dL (calc) 210 (H)   Creatinine Urine      20 - 320 mg/dL 67   Microalbumin, urine      mg/dL 11.3   Microalbumin/Creat Ratio      <30 mcg/mg creat 169 (H)   Hemoglobin A1c, (calculated)      <5.7 % of total Hgb 9.1 (H)   eAG (mg/dL)      mg/dL 214   eAG (mmol/L)      mmol/L 11.9   VITAMIN D, 25-HYDROXY      30 - 100 ng/mL 35     Assessment/Plan:     1. DM w/o complication type I, uncontrolled (250.03) his most recent Hgb A1c was 9.1% in 4/22 down from 10.1% in 11/21 down from 10.3% in 11/20 stable from 5/20 up from 8.1% in 10/19 down from 9.9% in 4/19 up from 7.7% in 10/18 down from 8.1% in 3/18 up from 7.4% in 9/17 down from 8.3% in 4/17 down from 8.8% in 11/16 up from 8.3% in 6/16 down from 8.6% in 2/16 up from 8.3% in 9/15 down from 9.4% in 4/15 up from 9.3% in 1/14 down from 10.4% in 9/13 up from 10.1% in 5/13 up from 8.1% in 1/13 down from 8.7% in Oct up from 8.3% in May up from 7.7% in February 2012 up from 6.8% in November 2011 down from 9% in September. A1c is above goal of <7.5% given hypoglycemia unawareness. His control is better and hopefully with following a low carb diet and losing weight, his next one will be even better.   - cont gabapentin 300 mg bid  - cont tresiba 112 units in the morning  - cont novolog 16 units before each meal + 2 units for every 50 mg/dl above 150 mg/dl   - check bs up to 6 times per day due to fluctuating blood sugars  - foot exam done 11/21  - optho UTD 2/19  - microalbumin nl 2/12 and 5/12, up to 37 in 5/13, down to 23 in 6/16, up to 47 in 4/17 and 148 in 3/18 and 141 in 4/19, down to 63 in 5/20, up to 185 in 11/21, down to 169 in 4/22  - check A1c and cmp and microalbumin prior to next visit (send to [address])         2. HTN (hypertension) (401. 9AF) his BP was at goal < 140/90  - cont norvasc 10 mg daily  - cont lisinopril 40 mg daily  - cont hctz 25 mg daily  - monitor home readings       3. Other and unspecified hyperlipidemia (272.4) Given DM and TIA, Goal LDL < 70, non-HDL < 100, and TG < 150. LDL 94 in November 2011 and 97 in February 2012. Up to 106 in May despite switching to lipitor but back to 97 in Oct 2012 and 65 in 1/13 but up to 131 in 5/13 due to diet. Had been off lipitor for 12 days in 9/13 and LDL was unable to be calculated due to TG of 565 and non-HDL of 228.  in 1/14. Couldn't be calculated in 4/15 due to high TG of 874 while off lipitor.  and  in 9/15.  and  in 2/16 and 175 and 231 and LDL 96 in 6/16.  and  in 11/16 due to being off lipitor for 1 week but down to TC of 204 and TG of 253 in 4/17.  and  in 9/17.  and  in 3/18.  and  in 10/18 so stopped lipitor and changed to crestor 20 mg but had myalgias so decreased to 1/2 tab in 1/19 and  and  in 4/19 with higher A1c.  and  in 10/19 with lower A1c.  and  in 5/20 with higher A1c. Non- and  in 11/20 with not taking crestor regularly.  and  and non- in 11/21.   ,  and non- in 4/22 with being off diet and accidentally taking 1/2 of the 10 mg tab  - cont crestor 10 mg at night  - check lipids prior to next visit       4. Vitamin D deficiency (268.9) His level was 17.2 in February and up to 29.9 in May and 39.4 in Oct 2012 and 45 in 1/13 and 36 in 5/13. Down to 25.6 in 9/13 but had been off vitamin D and up to 31.9 in 1/14. Currently off vitamin D and level 14.7 in 4/15 and 19.7 in 9/15. Back on since then and 43 in 2/16 and 41 in 6/16 and 39 in 4/17 and 33 in 3/18 and 36 in 4/19 and 39 in 5/20 on 2000 units daily. Down to 27 in 11/21 so double to 4000 units daily and up to 35 in 4/22. Goal > 30.       - cont vitamin D 4000 units daily       - check Vitamin D 25-OH level in 4/23    Patient Instructions   1) Your Hemoglobin A1c (3 month test of blood sugar) has dropped from 10.1% to 9.1% which is the best since 10/19. 2) If you continue a low carb diet, If you have 2 or more readings under 80 in a week, then decrease your tresiba to 100 units and if needed further decrease by 10 units every week to get to the lowest dose that keeps you .      3) Your blood pressure is controlled off the metoprolol so stay off this. 4) Your cholesterol was higher due to diet over the holidays but also due to taking 1/2 of the 10 mg tab and you should be on one whole tab daily. 5) Your vitamin D level is 35 which is normal.  Goal is over 30. Please continue to take your current dose of vitamin D 4000 units daily to keep your levels at goal.    6) Microalbumin/creatinine ratio is a marker of the amount of protein in your urine. Goal is less than 30. Your value is abnormal but improved. This indicates that your kidneys are being less affected by your uncontrolled diabetes and/or blood pressure. 7) ALT and AST are markers of liver function. Your values are normal.    8) BUN and creatinine are markers of kidney function. Your values are slightly abnormal. Your creatinine (kidney test) was slightly high due to mild dehydration.  Drink at least 4 8oz glasses of water everyday to stay hydrated to prevent your creatinine level from going higher. Follow-up and Dispositions    · Return in about 6 months (around 11/2/2022).                Copy sent to:  Juice Daniels MD

## 2022-05-15 DIAGNOSIS — E55.9 VITAMIN D DEFICIENCY: ICD-10-CM

## 2022-05-15 DIAGNOSIS — E10.65 UNCONTROLLED TYPE 1 DIABETES MELLITUS WITH HYPERGLYCEMIA (HCC): ICD-10-CM

## 2022-05-15 DIAGNOSIS — E78.5 HYPERLIPIDEMIA LDL GOAL <70: ICD-10-CM

## 2022-05-15 DIAGNOSIS — I10 ESSENTIAL HYPERTENSION, BENIGN: ICD-10-CM

## 2022-05-24 DIAGNOSIS — E10.65 UNCONTROLLED TYPE 1 DIABETES MELLITUS WITH HYPERGLYCEMIA (HCC): ICD-10-CM

## 2022-05-24 RX ORDER — GABAPENTIN 300 MG/1
CAPSULE ORAL
Qty: 180 CAPSULE | Refills: 1 | Status: SHIPPED | OUTPATIENT
Start: 2022-05-24 | End: 2022-10-24

## 2022-10-15 DIAGNOSIS — I10 ESSENTIAL HYPERTENSION, BENIGN: ICD-10-CM

## 2022-10-15 DIAGNOSIS — E10.65 UNCONTROLLED TYPE 1 DIABETES MELLITUS WITH HYPERGLYCEMIA (HCC): ICD-10-CM

## 2022-10-15 DIAGNOSIS — E55.9 VITAMIN D DEFICIENCY: ICD-10-CM

## 2022-10-15 DIAGNOSIS — E78.5 HYPERLIPIDEMIA LDL GOAL <70: ICD-10-CM

## 2022-10-24 DIAGNOSIS — E10.65 UNCONTROLLED TYPE 1 DIABETES MELLITUS WITH HYPERGLYCEMIA (HCC): ICD-10-CM

## 2022-10-24 RX ORDER — INSULIN DEGLUDEC INJECTION 200 U/ML
INJECTION, SOLUTION SUBCUTANEOUS
Qty: 54 ML | Refills: 3 | Status: SHIPPED | OUTPATIENT
Start: 2022-10-24

## 2022-10-24 RX ORDER — GABAPENTIN 300 MG/1
CAPSULE ORAL
Qty: 180 CAPSULE | Refills: 1 | Status: SHIPPED | OUTPATIENT
Start: 2022-10-24

## 2022-11-07 ENCOUNTER — OFFICE VISIT (OUTPATIENT)
Dept: ENDOCRINOLOGY | Age: 52
End: 2022-11-07
Payer: COMMERCIAL

## 2022-11-07 VITALS
DIASTOLIC BLOOD PRESSURE: 77 MMHG | HEART RATE: 92 BPM | BODY MASS INDEX: 36.64 KG/M2 | WEIGHT: 214.6 LBS | HEIGHT: 64 IN | SYSTOLIC BLOOD PRESSURE: 159 MMHG

## 2022-11-07 DIAGNOSIS — I10 ESSENTIAL HYPERTENSION, BENIGN: ICD-10-CM

## 2022-11-07 DIAGNOSIS — E10.65 UNCONTROLLED TYPE 1 DIABETES MELLITUS WITH HYPERGLYCEMIA (HCC): Primary | ICD-10-CM

## 2022-11-07 DIAGNOSIS — E55.9 VITAMIN D DEFICIENCY: ICD-10-CM

## 2022-11-07 DIAGNOSIS — E78.5 HYPERLIPIDEMIA LDL GOAL <70: ICD-10-CM

## 2022-11-07 PROCEDURE — 3046F HEMOGLOBIN A1C LEVEL >9.0%: CPT | Performed by: INTERNAL MEDICINE

## 2022-11-07 PROCEDURE — 3074F SYST BP LT 130 MM HG: CPT | Performed by: INTERNAL MEDICINE

## 2022-11-07 PROCEDURE — 99214 OFFICE O/P EST MOD 30 MIN: CPT | Performed by: INTERNAL MEDICINE

## 2022-11-07 PROCEDURE — 3078F DIAST BP <80 MM HG: CPT | Performed by: INTERNAL MEDICINE

## 2022-11-07 NOTE — PROGRESS NOTES
Chief Complaint   Patient presents with    Diabetes     PCP and pharmacy confirmed     History of Present Illness: Radha Spencer is a 46 y.o. male here for follow up of diabetes. Weight down 7 lbs since last visit in 5/22. Has still been trying to eat lower carbs. Has been working night shift at [address] [place] [address] from 7:30pm to 10:30pm since August and still works his normal job from [address] [place] [address] [facility] [employer] [facility] to have low sugars when he is more active at work and never decreased his tresiba as instructed so will do this now. May cheat once a week and have more carbs and will take novolog about 10 units for these meals but this may not be enough. Compliant with BP and lipid regimen. Hasn't checked home BP readings recently. Compliant with vitamin D. Was on vacation the week of his lab draw after having right eye cataract surgery. Current Outpatient Medications   Medication Sig    gabapentin (NEURONTIN) 300 mg capsule TAKE 1 CAPSULE TWICE DAILY. MAXIMUM DAILY AMOUNT: 600MG    Tresiba FlexTouch U-200 200 unit/mL (3 mL) inpn pen INJECT 110  UNITS IN THE MORNING    Insulin Needles, Disposable, (BD Ultra-Fine Mini Pen Needle) 31 gauge x 3/16\" ndle Use as directed 3 times daily    rosuvastatin (CRESTOR) 10 mg tablet Take 1 whole tab daily--note replaces prior prescription for 20 mg tabs    amLODIPine (NORVASC) 10 mg tablet TAKE 1 TABLET DAILY    lisinopriL (PRINIVIL, ZESTRIL) 40 mg tablet TAKE 1 TABLET DAILY    hydroCHLOROthiazide (HYDRODIURIL) 25 mg tablet TAKE 1 TABLET DAILY    insulin aspart U-100 (NovoLOG Flexpen U-100 Insulin) 100 unit/mL (3 mL) inpn INJECT 10 UNITS SUBCUTANEOUSLY 3 TIMES A DAY PLUS 2 UNITS FOR EVERY 50MG/DECILITER ABOVE 150MG/DECILITER. MAXIMUM 100 UNITS PER DAY    b complex vitamins tablet Take 1 Tab by mouth daily. insulin syringe-needle U-100 1 mL 31 gauge x 15/64\" syrg 1 Syringe by SubCUTAneous route five (5) times daily. aspirin 81 mg chewable tablet Take 81 mg by mouth daily.     Mallory Spangler ULTRA TEST strip Test up to 6 times daily. Dx: 065.80    ONE TOUCH DELICA 33 gauge misc Use as directed up to 6 times daily. Dx: 250.03    cholecalciferol, vitamin D3, 50 mcg (2,000 unit) tab Take 4,000 Units by mouth daily. MV-MN/FA/D3/LYCOPENE/LUT/COQ10 (DAILY MULTIVITAMIN PO) Take  by mouth. No current facility-administered medications for this visit. Allergies   Allergen Reactions    Cymbalta [Duloxetine] Other (comments)     Numbness in hands that resolved with stopping     Review of Systems: PER HPI    Physical Examination:  Blood pressure (!) 159/77, pulse 92, height 5' 4\" (1.626 m), weight 214 lb 9.6 oz (97.3 kg).   General: pleasant, no distress, good eye contact   Neck: no carotid bruits  Cardiovascular: regular, normal rate, nl s1 and s2, no m/r/g,   Respiratory: clear bilaterally  Integumentary: no edema,   Psychiatric: normal mood and affect    Diabetic foot exam:     Left Foot:   Visual Exam: callous - mild   Pulse DP: 2+ (normal)   Filament test: reduced sensation    Vibratory sensation: diminished      Right Foot:   Visual Exam: callous - mild   Pulse DP: 2+ (normal)   Filament test: reduced sensation    Vibratory sensation: diminished      Data Reviewed:   Component      Latest Ref Rng & Units 11/3/2022 11/3/2022 11/3/2022 11/3/2022           8:03 AM  8:03 AM  8:03 AM  8:03 AM   Glucose      65 - 99 mg/dL  130 (H)     BUN      7 - 25 mg/dL  26 (H)     Creatinine      0.70 - 1.30 mg/dL  1.21     eGFR      > OR = 60 mL/min/1.73m2  72     BUN/Creatinine ratio      6 - 22 (calc)  21     Sodium      135 - 146 mmol/L  136     Potassium      3.5 - 5.3 mmol/L  4.4     Chloride      98 - 110 mmol/L  96 (L)     CO2      20 - 32 mmol/L  31     Calcium      8.6 - 10.3 mg/dL  9.6     Protein, total      6.1 - 8.1 g/dL  7.4     Albumin      3.6 - 5.1 g/dL  4.4     Globulin      1.9 - 3.7 g/dL (calc)  3.0     ALB/GLOBRATIO      1.0 - 2.5 (calc)  1.5     Bilirubin, total      0.2 - 1.2 mg/dL  0.4 Alkaline Phosphatase, total      35 - 144 U/L  105     AST      10 - 35 U/L  19     ALT      9 - 46 U/L  11     Cholesterol, total      <200 mg/dL   217 (H)    HDL Cholesterol      > OR = 40 mg/dL   35 (L)    Triglyceride      <150 mg/dL   416 (H)    LDL-CHOLESTEROL      mg/dL (calc)       Cholesterol/HDL ratio      <5.0 (calc)   6.2 (H)    Non-HDL Cholesterol      <130 mg/dL (calc)   182 (H)    Creatinine Urine      20 - 320 mg/dL    69   Microalbumin, urine      mg/dL    8.6   Microalbumin/Creat Ratio      <30 mcg/mg creat    125 (H)   Hemoglobin A1c, (calculated)      <5.7 % of total Hgb 9.2 (H)      eAG (mg/dL)      mg/dL 217      eAG (mmol/L)      mmol/L 12.0          Assessment/Plan:     1. DM w/o complication type I, uncontrolled (250.03) his most recent Hgb A1c was 9.2% in 11/22 up from 9.1% in 4/22 down from 10.1% in 11/21 down from 10.3% in 11/20 stable from 5/20 up from 8.1% in 10/19 down from 9.9% in 4/19 up from 7.7% in 10/18 down from 8.1% in 3/18 up from 7.4% in 9/17 down from 8.3% in 4/17 down from 8.8% in 11/16 up from 8.3% in 6/16 down from 8.6% in 2/16 up from 8.3% in 9/15 down from 9.4% in 4/15 up from 9.3% in 1/14 down from 10.4% in 9/13 up from 10.1% in 5/13 up from 8.1% in 1/13 down from 8.7% in Oct up from 8.3% in May up from 7.7% in February 2012 up from 6.8% in November 2011 down from 9% in September. A1c is above goal of <7.5% given hypoglycemia unawareness. Needs less tresiba on working days to avoid lows.   - cont gabapentin 300 mg bid  - cont tresiba 112 units in the morning on non-working days but 100 units on working days  - cont novolog 16 units before each meal + 2 units for every 50 mg/dl above 150 mg/dl   - check bs up to 6 times per day due to fluctuating blood sugars  - foot exam done 11/22  - optho UTD 2/19  - microalbumin nl 2/12 and 5/12, up to 37 in 5/13, down to 23 in 6/16, up to 47 in 4/17 and 148 in 3/18 and 141 in 4/19, down to 63 in 5/20, up to 185 in 11/21, down to 169 in 4/22 and 125 in 11/22  - check A1c and cmp prior to next visit (send to [id number])         2. HTN (hypertension) (401. 9AF) his BP was above goal < 140/90 so will monitor some home readings  - cont norvasc 10 mg daily  - cont lisinopril 40 mg daily  - cont hctz 25 mg daily  - monitor home readings       3. Other and unspecified hyperlipidemia (272.4) Given DM and TIA, Goal LDL < 70, non-HDL < 100, and TG < 150. LDL 94 in November 2011 and 97 in February 2012. Up to 106 in May despite switching to lipitor but back to 97 in Oct 2012 and 65 in 1/13 but up to 131 in 5/13 due to diet. Had been off lipitor for 12 days in 9/13 and LDL was unable to be calculated due to TG of 565 and non-HDL of 228.  in 1/14. Couldn't be calculated in 4/15 due to high TG of 874 while off lipitor.  and  in 9/15.  and  in 2/16 and 175 and 231 and LDL 96 in 6/16.  and  in 11/16 due to being off lipitor for 1 week but down to TC of 204 and TG of 253 in 4/17.  and  in 9/17.  and  in 3/18.  and  in 10/18 so stopped lipitor and changed to crestor 20 mg but had myalgias so decreased to 1/2 tab in 1/19 and  and  in 4/19 with higher A1c.  and  in 10/19 with lower A1c.  and  in 5/20 with higher A1c. Non- and  in 11/20 with not taking crestor regularly.  and  and non- in 11/21. ,  and non- in 4/22 with being off diet and accidentally taking 1/2 of the 10 mg tab. Non- and  in 11/22  - cont crestor 10 mg at night  - check lipids prior to next visit       4. Vitamin D deficiency (268.9) His level was 17.2 in February and up to 29.9 in May and 39.4 in Oct 2012 and 45 in 1/13 and 36 in 5/13. Down to 25.6 in 9/13 but had been off vitamin D and up to 31.9 in 1/14. Currently off vitamin D and level 14.7 in 4/15 and 19.7 in 9/15.   Back on since then and 43 in 2/16 and 41 in 6/16 and 39 in 4/17 and 33 in 3/18 and 36 in 4/19 and 39 in 5/20 on 2000 units daily. Down to 27 in 11/21 so double to 4000 units daily and up to 35 in 4/22. Goal > 30.       - cont vitamin D 4000 units daily       - check Vitamin D 25-OH level prior to next visit        Patient Instructions   1) Try taking 100 units of tresiba prior to work days but stay on 112 on non-working days to avoid lows with activity. 2) You may need closer to 15-20 units of novolog for your cheat meals to prevent spikes that are likely keeping your A1c up. 3) BUN and creatinine are markers of kidney function. Your values have come back to normal since last visit. 4) ALT and AST are markers of liver function. Your values are normal.    5) Your urine protein is better. 6) Your total cholesterol has improved but your triglycerides (short term fats) were up likely from being on vacation. 7) Start monitoring blood pressure about 2-3 times per week at alternating times either in the morning or evening after resting for 5 minutes and sitting upright in a chair with your arm at heart level. Please let me know if you are having readings over 140 on the top number or 90 on the bottom number. Follow-up and Dispositions    Return in about 6 months (around 5/7/2023).                Copy sent to:  Singh Pisano MD

## 2022-11-07 NOTE — PATIENT INSTRUCTIONS
1) Try taking 100 units of tresiba prior to work days but stay on 112 on non-working days to avoid lows with activity. 2) You may need closer to 15-20 units of novolog for your cheat meals to prevent spikes that are likely keeping your A1c up. 3) BUN and creatinine are markers of kidney function. Your values have come back to normal since last visit. 4) ALT and AST are markers of liver function. Your values are normal.    5) Your urine protein is better. 6) Your total cholesterol has improved but your triglycerides (short term fats) were up likely from being on vacation. 7) Start monitoring blood pressure about 2-3 times per week at alternating times either in the morning or evening after resting for 5 minutes and sitting upright in a chair with your arm at heart level. Please let me know if you are having readings over 140 on the top number or 90 on the bottom number.

## 2023-01-05 RX ORDER — HYDROCHLOROTHIAZIDE 25 MG/1
TABLET ORAL
Qty: 90 TABLET | Refills: 3 | Status: SHIPPED | OUTPATIENT
Start: 2023-01-05

## 2023-01-05 RX ORDER — AMLODIPINE BESYLATE 10 MG/1
TABLET ORAL
Qty: 90 TABLET | Refills: 3 | Status: SHIPPED | OUTPATIENT
Start: 2023-01-05

## 2023-01-05 RX ORDER — LISINOPRIL 40 MG/1
TABLET ORAL
Qty: 90 TABLET | Refills: 3 | Status: SHIPPED | OUTPATIENT
Start: 2023-01-05

## 2023-01-21 DIAGNOSIS — E10.65 UNCONTROLLED TYPE 1 DIABETES MELLITUS WITH HYPERGLYCEMIA (HCC): ICD-10-CM

## 2023-01-21 RX ORDER — ROSUVASTATIN CALCIUM 10 MG/1
TABLET, COATED ORAL
Qty: 90 TABLET | Refills: 3 | Status: SHIPPED | OUTPATIENT
Start: 2023-01-21

## 2023-01-22 RX ORDER — GABAPENTIN 300 MG/1
CAPSULE ORAL
Qty: 180 CAPSULE | Refills: 1 | Status: SHIPPED | OUTPATIENT
Start: 2023-01-22

## 2023-03-29 DIAGNOSIS — E10.65 UNCONTROLLED TYPE 1 DIABETES MELLITUS WITH HYPERGLYCEMIA (HCC): Primary | ICD-10-CM

## 2023-03-29 RX ORDER — BLOOD-GLUCOSE SENSOR
EACH MISCELLANEOUS
Qty: 2 EACH | Refills: 11 | Status: SHIPPED | OUTPATIENT
Start: 2023-03-29

## 2023-05-15 ENCOUNTER — OFFICE VISIT (OUTPATIENT)
Age: 53
End: 2023-05-15
Payer: COMMERCIAL

## 2023-05-15 VITALS
BODY MASS INDEX: 36.57 KG/M2 | DIASTOLIC BLOOD PRESSURE: 62 MMHG | WEIGHT: 214.2 LBS | HEIGHT: 64 IN | SYSTOLIC BLOOD PRESSURE: 166 MMHG | HEART RATE: 95 BPM

## 2023-05-15 DIAGNOSIS — E10.65 TYPE 1 DIABETES MELLITUS WITH HYPERGLYCEMIA (HCC): Primary | ICD-10-CM

## 2023-05-15 DIAGNOSIS — E55.9 VITAMIN D DEFICIENCY, UNSPECIFIED: ICD-10-CM

## 2023-05-15 DIAGNOSIS — I10 ESSENTIAL (PRIMARY) HYPERTENSION: ICD-10-CM

## 2023-05-15 DIAGNOSIS — E78.5 HYPERLIPIDEMIA LDL GOAL <70: ICD-10-CM

## 2023-05-15 PROCEDURE — 3077F SYST BP >= 140 MM HG: CPT | Performed by: INTERNAL MEDICINE

## 2023-05-15 PROCEDURE — 3051F HG A1C>EQUAL 7.0%<8.0%: CPT | Performed by: INTERNAL MEDICINE

## 2023-05-15 PROCEDURE — 3078F DIAST BP <80 MM HG: CPT | Performed by: INTERNAL MEDICINE

## 2023-05-15 PROCEDURE — 99214 OFFICE O/P EST MOD 30 MIN: CPT | Performed by: INTERNAL MEDICINE

## 2023-05-15 RX ORDER — GABAPENTIN 300 MG/1
CAPSULE ORAL
Qty: 540 CAPSULE | Refills: 1 | Status: SHIPPED | OUTPATIENT
Start: 2023-05-15 | End: 2023-11-15

## 2023-05-15 RX ORDER — BLOOD-GLUCOSE SENSOR
EACH MISCELLANEOUS
COMMUNITY
Start: 2023-04-25

## 2023-05-15 NOTE — PATIENT INSTRUCTIONS
1) Your Hemoglobin A1c (3 month test of blood sugar) is much better at 7.8% down from 9.2% in 11/22 and is the best it's been in 4 years. 2) Still try to fine tune the novolog dose to prevent spikes over 180 when checked 2 hours after you eat. If your sugar is under 90 going into the meal, then eat first but be sure to take the novolog right after you finish eating. 3) BUN and creatinine are markers of kidney function. Your values were slightly higher due to mild dehydration from having a sugar of 294 on the day of the lab draw. 4) Your total and LDL (bad cholesterol) and triglycerides (short term fats) are all much better with lower A1c.    5) Your vitamin D is normal.    6) I will change your gabapentin to 300 mg caps to take 2 caps twice daily but up to 6 caps/day as needed and sent this to [facility].

## 2023-05-15 NOTE — PROGRESS NOTES
level in 5/24      Patient Instructions   1) Your Hemoglobin A1c (3 month test of blood sugar) is much better at 7.8% down from 9.2% in 11/22 and is the best it's been in 4 years. 2) Still try to fine tune the novolog dose to prevent spikes over 180 when checked 2 hours after you eat. If your sugar is under 90 going into the meal, then eat first but be sure to take the novolog right after you finish eating. 3) BUN and creatinine are markers of kidney function. Your values were slightly higher due to mild dehydration from having a sugar of 294 on the day of the lab draw. 4) Your total and LDL (bad cholesterol) and triglycerides (short term fats) are all much better with lower A1c.    5) Your vitamin D is normal.    6) I will change your gabapentin to 300 mg caps to take 2 caps twice daily but up to 6 caps/day as needed and sent this to [facility]. Return in about 6 months (around 11/15/2023).         Copy sent to:  Jorge Luis Serrato MD

## 2023-11-07 LAB
ALB/GLOBRATIO, 58C: 1.7 (CALC) (ref 1–2.5)
ALBUMIN SERPL-MCNC: 4.4 G/DL (ref 3.6–5.1)
ALKALINE PHOSPHATASE, TOTAL, 25002000: 89 U/L (ref 35–144)
ALT SERPL-CCNC: 11 U/L (ref 9–46)
AST SERPL W P-5'-P-CCNC: 17 U/L (ref 10–35)
BILIRUB SERPL-MCNC: 0.5 MG/DL (ref 0.2–1.2)
BUN SERPL-MCNC: 30 MG/DL (ref 7–25)
BUN/CREATININE RATIO,BUCR: 24 (CALC) (ref 6–22)
CALCIUM SERPL-MCNC: 10 MG/DL (ref 8.6–10.3)
CHLORIDE SERPL-SCNC: 100 MMOL/L (ref 98–110)
CHOL/HDL RATIO,CHHDX: 4.7 (CALC)
CHOLEST SERPL-MCNC: 198 MG/DL
CO2 SERPL-SCNC: 30 MMOL/L (ref 20–32)
CREAT SERPL-MCNC: 1.26 MG/DL (ref 0.7–1.3)
CREATININE URINE,9612018: 78 MG/DL (ref 20–320)
EGFR: 68 ML/MIN/1.73M2
GLOBULIN,GLOB: 2.6 G/DL (CALC) (ref 1.9–3.7)
GLUCOSE SERPL-MCNC: 149 MG/DL (ref 65–99)
HBA1C MFR BLD HPLC: 9.1 %
HBA1C MFR BLD HPLC: 9.1 % OF TOTAL HGB
HDLC SERPL-MCNC: 42 MG/DL
LDL CHOLESTEROL, EXTERNAL: 119
LDL-CHOLESTEROL: 119 MG/DL (CALC)
MICROALBUMIN,URINE RANDOM 140054: 6.6 MG/DL
MICROALBUMIN/CREAT RATIO: 85 MCG/MG CREAT
MICROALBUMIN/CREATININE RATIO, EXTERNAL: 6.6
NON-HDL CHOLESTEROL, 011976: 156 MG/DL (CALC)
POTASSIUM SERPL-SCNC: 4.9 MMOL/L (ref 3.5–5.3)
PROT SERPL-MCNC: 7 G/DL (ref 6.1–8.1)
SODIUM SERPL-SCNC: 139 MMOL/L (ref 135–146)
TRIGL SERPL-MCNC: 262 MG/DL (ref ?–150)

## 2023-11-13 ENCOUNTER — OFFICE VISIT (OUTPATIENT)
Age: 53
End: 2023-11-13
Payer: COMMERCIAL

## 2023-11-13 VITALS
BODY MASS INDEX: 36.12 KG/M2 | HEART RATE: 90 BPM | WEIGHT: 211.6 LBS | DIASTOLIC BLOOD PRESSURE: 60 MMHG | HEIGHT: 64 IN | SYSTOLIC BLOOD PRESSURE: 130 MMHG

## 2023-11-13 DIAGNOSIS — E78.5 HYPERLIPIDEMIA LDL GOAL <70: ICD-10-CM

## 2023-11-13 DIAGNOSIS — E55.9 VITAMIN D DEFICIENCY, UNSPECIFIED: ICD-10-CM

## 2023-11-13 DIAGNOSIS — E10.65 TYPE 1 DIABETES MELLITUS WITH HYPERGLYCEMIA (HCC): Primary | ICD-10-CM

## 2023-11-13 DIAGNOSIS — I10 ESSENTIAL (PRIMARY) HYPERTENSION: ICD-10-CM

## 2023-11-13 PROCEDURE — 99214 OFFICE O/P EST MOD 30 MIN: CPT | Performed by: INTERNAL MEDICINE

## 2023-11-13 PROCEDURE — 3051F HG A1C>EQUAL 7.0%<8.0%: CPT | Performed by: INTERNAL MEDICINE

## 2023-11-13 PROCEDURE — 3075F SYST BP GE 130 - 139MM HG: CPT | Performed by: INTERNAL MEDICINE

## 2023-11-13 PROCEDURE — 3078F DIAST BP <80 MM HG: CPT | Performed by: INTERNAL MEDICINE

## 2023-11-13 RX ORDER — ACYCLOVIR 400 MG/1
TABLET ORAL
COMMUNITY

## 2023-11-13 RX ORDER — FLURBIPROFEN SODIUM 0.3 MG/ML
SOLUTION/ DROPS OPHTHALMIC
Qty: 400 EACH | Refills: 3 | Status: SHIPPED | OUTPATIENT
Start: 2023-11-13

## 2023-11-13 RX ORDER — PREDNISONE 5 MG/1
5 TABLET ORAL DAILY
COMMUNITY

## 2023-11-13 NOTE — PROGRESS NOTES
as below. - cont gabapentin 300 mg 2 caps bid and up to 6 caps/day as needed  - cont tresiba 112 units in the morning on non-working days but 100 units on working days (while on prednisone--may be able to go back to 90 once off prednisone)  - cont novolog 10 units before each meal + 2 units for every 50 mg/dl above 150 mg/dl   - check bs up to 6 times per day due to fluctuating blood sugars  - foot exam done 11/23  - optho UTD 2/19  - microalbumin nl 2/12 and 5/12, up to 37 in 5/13, down to 23 in 6/16, up to 47 in 4/17 and 148 in 3/18 and 141 in 4/19, down to 63 in 5/20, up to 185 in 11/21, down to 169 in 4/22 and 125 in 11/22, down to 85 in 11/23  - check A1c and cmp prior to next visit (send to [address] [facility])         2. HTN (hypertension) (401. 9AF) his BP was at goal < 140/90   - cont norvasc 10 mg daily  - cont lisinopril 40 mg daily  - cont hctz 25 mg daily  - monitor home readings       3. Other and unspecified hyperlipidemia (272.4) Given DM and TIA, Goal LDL < 70, non-HDL < 100, and TG < 150. LDL 94 in November 2011 and 97 in February 2012. Up to 106 in May despite switching to lipitor but back to 97 in Oct 2012 and 65 in 1/13 but up to 131 in 5/13 due to diet. Had been off lipitor for 12 days in 9/13 and LDL was unable to be calculated due to TG of 565 and non-HDL of 228.  in 1/14. Couldn't be calculated in 4/15 due to high TG of 874 while off lipitor.  and  in 9/15.  and  in 2/16 and 175 and 231 and LDL 96 in 6/16.  and  in 11/16 due to being off lipitor for 1 week but down to TC of 204 and TG of 253 in 4/17.  and  in 9/17.  and  in 3/18.  and  in 10/18 so stopped lipitor and changed to crestor 20 mg but had myalgias so decreased to 1/2 tab in 1/19 and  and  in 4/19 with higher A1c.  and  in 10/19 with lower A1c.  and  in 5/20 with higher A1c.   Non- and  in 11/20 with

## 2023-11-13 NOTE — PATIENT INSTRUCTIONS
1) Please accept the invite I sent to your e-mail so you can share your dexcom data with me and I can see if there are any trends that warrant changes to your insulin regimen. Once you have done this, please let me know over mychart. Thanks. 2) Your Hemoglobin A1c (3 month test of blood sugar) jatinder from 7.8% to 9.1% likely due to diet and the steroids. If your shoulder is doing better you can try to taper off as follows:  - take a whole 5 mg tabs on Mon, Wed, and Fri and 1/2 tab the other 4 days for 2 weeks, then  - Take 1/2 tab 7 days a week for 2 weeks, then  - take 1/2 tab every other day for 2 weeks, then stop    If you have increase in pain while doing this taper, then try to stay on the lowest dose that gives you relief. 3) BUN and creatinine are markers of kidney function. Your values are improved and your urine protein was better. 4) Your cholesterol was better.

## 2024-02-23 DIAGNOSIS — E10.65 TYPE 1 DIABETES MELLITUS WITH HYPERGLYCEMIA (HCC): ICD-10-CM

## 2024-02-23 RX ORDER — GABAPENTIN 300 MG/1
CAPSULE ORAL
Qty: 540 CAPSULE | Refills: 1 | Status: CANCELLED | OUTPATIENT
Start: 2024-02-23 | End: 2024-08-25

## 2024-02-23 RX ORDER — INSULIN DEGLUDEC 200 U/ML
INJECTION, SOLUTION SUBCUTANEOUS
Qty: 54 ML | Refills: 3 | Status: CANCELLED | OUTPATIENT
Start: 2024-02-23

## 2024-02-23 RX ORDER — INSULIN DEGLUDEC 200 U/ML
INJECTION, SOLUTION SUBCUTANEOUS
Qty: 54 ML | Refills: 3 | Status: SHIPPED | OUTPATIENT
Start: 2024-02-23

## 2024-02-23 RX ORDER — GABAPENTIN 300 MG/1
CAPSULE ORAL
Qty: 540 CAPSULE | Refills: 1 | Status: SHIPPED | OUTPATIENT
Start: 2024-02-23 | End: 2024-08-25

## 2024-03-08 RX ORDER — ROSUVASTATIN CALCIUM 10 MG/1
TABLET, COATED ORAL
Qty: 90 TABLET | Refills: 3 | Status: SHIPPED | OUTPATIENT
Start: 2024-03-08

## 2024-03-10 RX ORDER — LISINOPRIL 40 MG/1
TABLET ORAL
Qty: 90 TABLET | Refills: 3 | Status: SHIPPED | OUTPATIENT
Start: 2024-03-10

## 2024-03-10 RX ORDER — HYDROCHLOROTHIAZIDE 25 MG/1
TABLET ORAL
Qty: 90 TABLET | Refills: 3 | Status: SHIPPED | OUTPATIENT
Start: 2024-03-10

## 2024-03-10 RX ORDER — AMLODIPINE BESYLATE 10 MG/1
TABLET ORAL
Qty: 90 TABLET | Refills: 3 | Status: SHIPPED | OUTPATIENT
Start: 2024-03-10

## 2024-03-21 RX ORDER — ACYCLOVIR 400 MG/1
TABLET ORAL
Qty: 9 EACH | Refills: 3 | Status: SHIPPED | OUTPATIENT
Start: 2024-03-21

## 2024-05-07 LAB — HBA1C MFR BLD HPLC: 7 %

## 2024-05-20 ENCOUNTER — OFFICE VISIT (OUTPATIENT)
Age: 54
End: 2024-05-20
Payer: COMMERCIAL

## 2024-05-20 VITALS
HEART RATE: 89 BPM | HEIGHT: 64 IN | BODY MASS INDEX: 36.77 KG/M2 | WEIGHT: 215.4 LBS | DIASTOLIC BLOOD PRESSURE: 64 MMHG | SYSTOLIC BLOOD PRESSURE: 132 MMHG

## 2024-05-20 DIAGNOSIS — E78.5 HYPERLIPIDEMIA LDL GOAL <70: ICD-10-CM

## 2024-05-20 DIAGNOSIS — E55.9 VITAMIN D DEFICIENCY, UNSPECIFIED: ICD-10-CM

## 2024-05-20 DIAGNOSIS — I10 ESSENTIAL (PRIMARY) HYPERTENSION: ICD-10-CM

## 2024-05-20 DIAGNOSIS — E10.65 TYPE 1 DIABETES MELLITUS WITH HYPERGLYCEMIA (HCC): Primary | ICD-10-CM

## 2024-05-20 PROCEDURE — 3075F SYST BP GE 130 - 139MM HG: CPT | Performed by: INTERNAL MEDICINE

## 2024-05-20 PROCEDURE — 3078F DIAST BP <80 MM HG: CPT | Performed by: INTERNAL MEDICINE

## 2024-05-20 PROCEDURE — 99214 OFFICE O/P EST MOD 30 MIN: CPT | Performed by: INTERNAL MEDICINE

## 2024-05-20 RX ORDER — GLUCAGON INJECTION, SOLUTION 1 MG/.2ML
INJECTION, SOLUTION SUBCUTANEOUS
Qty: 1 EACH | Refills: 11 | Status: SHIPPED | OUTPATIENT
Start: 2024-05-20

## 2024-05-20 RX ORDER — LANCETS 30 GAUGE
EACH MISCELLANEOUS
Qty: 100 EACH | Refills: 3 | Status: SHIPPED | OUTPATIENT
Start: 2024-05-20

## 2024-05-20 RX ORDER — GLUCOSAMINE HCL/CHONDROITIN SU 500-400 MG
CAPSULE ORAL
Qty: 100 STRIP | Refills: 3 | Status: SHIPPED | OUTPATIENT
Start: 2024-05-20

## 2024-05-20 NOTE — PROGRESS NOTES
252 in 5/20 with higher A1c.  Non- and  in 11/20 with not taking crestor regularly.   and  and non- in 11/21.  ,  and non- in 4/22 with being off diet and accidentally taking 1/2 of the 10 mg tab.  Non- and  in 11/22.  LDL 97,  and non- in 5/23.   and  in 11/23.  LDL 90 and  in 5/24.  - cont crestor 10 mg at night  - check lipids prior to next visit       4.  Vitamin D deficiency (268.9) His level was 17.2 in February and up to 29.9 in May and 39.4 in Oct 2012 and 45 in 1/13 and 36 in 5/13.  Down to 25.6 in 9/13 but had been off vitamin D and up to 31.9 in 1/14.  Currently off vitamin D and level 14.7 in 4/15 and 19.7 in 9/15.  Back on since then and 43 in 2/16 and 41 in 6/16 and 39 in 4/17 and 33 in 3/18 and 36 in 4/19 and 39 in 5/20 on 2000 units daily.  Down to 27 in 11/21 so double to 4000 units daily and up to 35 in 4/22 and 46 in 5/23 and 54 in 5/24.  Goal > 30.       - cont vitamin D 4000 units daily       - check Vitamin D 25-OH level in 5/25      Patient Instructions   1) [facility] invites you to share your continuous glucose monitoring (CGM) data using Dexcom Clarity.    Dexcom Clarity displays your CGM data in graphs so you and your clinic can view patterns, trends, and statistics anytime.    To start sharing data with this clinic, enter the clinic code in your Clarity reed, [employer], or at [email or web address]    Clinic code: [email or web address]    When you have done this, let me know over Swapferit so I can see your data.    2) Your Hemoglobin A1c (3 month test of blood sugar) is the best since 2011.  Keep up the good work!    3) Your creatinine (kidney test) was normal.  ALT and AST are markers of liver function.  Your values are normal.    4) Your cholesterol and blood pressure and vitamin D are controlled.    5) I sent gvoke glucagon pens to [facility] to have if needed for low sugar.

## 2024-05-20 NOTE — PATIENT INSTRUCTIONS
1) [facility] invites you to share your continuous glucose monitoring (CGM) data using Dexcom Clarity.    Dexcom [employer] displays your CGM data in graphs so you and your clinic can view patterns, trends, and statistics anytime.    To start sharing data with this clinic, enter the clinic code in your Clarity reed, Clarity web, or at [email or web address]    Clinic code: [email or web address]    When you have done this, let me know over Hytlehart so I can see your data.    2) Your Hemoglobin A1c (3 month test of blood sugar) is the best since 2011.  Keep up the good work!    3) Your creatinine (kidney test) was normal.  ALT and AST are markers of liver function.  Your values are normal.    4) Your cholesterol and blood pressure and vitamin D are controlled.    5) I sent gvoke glucagon pens to [facility] to have if needed for low sugar.  This will raise your sugar 15-30 points in 10-15 minutes. If your sugar is closer to 70, be sure to check on your finger to get a more accurate reading.      6) I sent a blood glucose meter, test strips, and lancets to your pharmacy to dispense whatever meter is covered by your insurance.    7) On the prep day when drinking clear liquids, plan on taking 80 units of tresiba in the morning.  You should not need much novolog that day but if your sugar is over 200, you can take just 5 units as needed.    On the morning of the colonoscopy I would also take 80 units of tresiba in the morning.

## 2024-07-21 RX ORDER — FLURBIPROFEN SODIUM 0.3 MG/ML
SOLUTION/ DROPS OPHTHALMIC
Qty: 300 EACH | Refills: 3 | Status: SHIPPED | OUTPATIENT
Start: 2024-07-21

## 2024-07-21 RX ORDER — INSULIN ASPART 100 [IU]/ML
INJECTION, SOLUTION INTRAVENOUS; SUBCUTANEOUS
Qty: 90 ML | Refills: 3 | Status: SHIPPED | OUTPATIENT
Start: 2024-07-21

## 2024-07-31 RX ORDER — BLOOD-GLUCOSE METER
EACH MISCELLANEOUS
Qty: 1 KIT | Refills: 0 | Status: SHIPPED | OUTPATIENT
Start: 2024-07-31

## 2024-10-10 RX ORDER — BLOOD-GLUCOSE METER
EACH MISCELLANEOUS
Qty: 1 KIT | Refills: 0 | Status: SHIPPED | OUTPATIENT
Start: 2024-10-10

## 2024-10-10 RX ORDER — INSULIN DEGLUDEC 200 U/ML
INJECTION, SOLUTION SUBCUTANEOUS
Qty: 54 ML | Refills: 3 | Status: SHIPPED | OUTPATIENT
Start: 2024-10-10 | End: 2024-10-10

## 2024-10-10 RX ORDER — GLUCOSAMINE HCL/CHONDROITIN SU 500-400 MG
CAPSULE ORAL
Qty: 100 STRIP | Refills: 3 | Status: SHIPPED | OUTPATIENT
Start: 2024-10-10

## 2024-10-10 RX ORDER — INSULIN DEGLUDEC 200 U/ML
INJECTION, SOLUTION SUBCUTANEOUS
Qty: 54 ML | Refills: 3 | Status: SHIPPED | OUTPATIENT
Start: 2024-10-10

## 2024-11-03 DIAGNOSIS — E10.65 TYPE 1 DIABETES MELLITUS WITH HYPERGLYCEMIA (HCC): ICD-10-CM

## 2024-11-04 RX ORDER — GABAPENTIN 300 MG/1
CAPSULE ORAL
Qty: 540 CAPSULE | Refills: 1 | Status: SHIPPED | OUTPATIENT
Start: 2024-11-04 | End: 2025-05-06

## 2024-11-19 LAB — HBA1C MFR BLD HPLC: 7.4 %

## 2024-11-22 ENCOUNTER — OFFICE VISIT (OUTPATIENT)
Age: 54
End: 2024-11-22

## 2024-11-22 VITALS
HEIGHT: 64 IN | DIASTOLIC BLOOD PRESSURE: 70 MMHG | BODY MASS INDEX: 37.01 KG/M2 | HEART RATE: 89 BPM | SYSTOLIC BLOOD PRESSURE: 148 MMHG | WEIGHT: 216.8 LBS

## 2024-11-22 DIAGNOSIS — E78.5 HYPERLIPIDEMIA LDL GOAL <70: ICD-10-CM

## 2024-11-22 DIAGNOSIS — E10.65 TYPE 1 DIABETES MELLITUS WITH HYPERGLYCEMIA (HCC): Primary | ICD-10-CM

## 2024-11-22 DIAGNOSIS — E55.9 VITAMIN D DEFICIENCY, UNSPECIFIED: ICD-10-CM

## 2024-11-22 DIAGNOSIS — I10 ESSENTIAL (PRIMARY) HYPERTENSION: ICD-10-CM

## 2024-11-22 NOTE — PATIENT INSTRUCTIONS
1) Your Hemoglobin A1c (3 month test of blood sugar) was 7.4% up from 7% which is not too bad considering the stress you have been under.    2) Your LDL (bad cholesterol) was 109 and goal is under 100 and ideally close to 70 or less so this was likely higher due to diet.    3) Your creatinine (kidney test) and liver was normal.    4) Your urine protein was slightly higher but still lower than the past 2-3 years.    5) Your blood pressure was just up slightly.    6) Try taking magnesium citrate 3x/week to help with constipation.

## 2024-11-22 NOTE — PROGRESS NOTES
Chief Complaint   Patient presents with    Diabetes     PCP and pharmacy confirmed     History of Present Illness: Slade Antoine is a 53 y.o. male here for follow up of diabetes.  Weight up 1 lbs since last visit in 5/24.  He has been under more stress the past 3 months as his 71 yo mother (she had him when she was 16) was diagnosed with cancer in 5/24 and declined over the summer and he and wife went to [place] for a month and he took FMLA so that he could care for her and she passed away in 9/24.  He was eating out more while with her.  Review of his dexcom data over the past 90 days shows 51% in range, 31% high, 16% very high, 2% low and <1% very low.  He will tend to spike after higher carb meals when he doesn't take enough novolog.  Compliant with BP and lipid regimen and vitamin D.  Has had more issues with constipation and does find that mag citrate helps when he takes as needed so advised him to take this 3x/week to see if this helps.  Did have a colonoscopy over the summer that didn't show any polyps.    he has the following indications to continue treatment with Dexcom:  1) he has type 1 diabetes (E10.65) and is on an intensive insulin regimen with 4 injections per day  2) he tests his blood sugar 4 times per day and makes treatment decisions off his blood sugar readings and dexcom sensor readings  3) he requires frequent adjustments to his insulin injection doses based on his dexcom sensor readings  4) he has benefited from therapeutic continuous glucose monitoring and I recommend that he continue with this  5) he is seen in my office every 6 months          Current Outpatient Medications   Medication Sig    gabapentin (NEURONTIN) 300 MG capsule Take 2 caps twice daily but may take up to 6 caps per day as max dose for neuropathy    blood glucose monitor strips Test once daily--dispense whatever is brand is covered by insurance    Blood Glucose Monitoring Suppl (ONETOUCH VERIO FLEX SYSTEM) w/Device KIT

## 2024-12-23 RX ORDER — LISINOPRIL 40 MG/1
TABLET ORAL
Qty: 90 TABLET | Refills: 3 | Status: SHIPPED | OUTPATIENT
Start: 2024-12-23

## 2024-12-23 RX ORDER — HYDROCHLOROTHIAZIDE 25 MG/1
TABLET ORAL
Qty: 90 TABLET | Refills: 3 | Status: SHIPPED | OUTPATIENT
Start: 2024-12-23

## 2024-12-24 RX ORDER — AMLODIPINE BESYLATE 10 MG/1
TABLET ORAL
Qty: 90 TABLET | Refills: 3 | Status: SHIPPED | OUTPATIENT
Start: 2024-12-24

## 2024-12-24 RX ORDER — ROSUVASTATIN CALCIUM 10 MG/1
TABLET, COATED ORAL
Qty: 90 TABLET | Refills: 3 | Status: SHIPPED | OUTPATIENT
Start: 2024-12-24

## 2024-12-24 RX ORDER — BLOOD-GLUCOSE METER
EACH MISCELLANEOUS
Qty: 1 KIT | Refills: 0 | Status: SHIPPED | OUTPATIENT
Start: 2024-12-24

## 2025-03-05 DIAGNOSIS — E10.65 TYPE 1 DIABETES MELLITUS WITH HYPERGLYCEMIA (HCC): ICD-10-CM

## 2025-03-06 RX ORDER — FLURBIPROFEN SODIUM 0.3 MG/ML
SOLUTION/ DROPS OPHTHALMIC
Qty: 400 EACH | Refills: 3 | Status: SHIPPED | OUTPATIENT
Start: 2025-03-06

## 2025-03-06 RX ORDER — GABAPENTIN 300 MG/1
CAPSULE ORAL
Qty: 540 CAPSULE | Refills: 1 | Status: SHIPPED | OUTPATIENT
Start: 2025-03-06 | End: 2025-09-06

## 2025-03-12 RX ORDER — ACYCLOVIR 400 MG/1
TABLET ORAL
Qty: 9 EACH | Refills: 3 | Status: SHIPPED | OUTPATIENT
Start: 2025-03-12

## 2025-03-16 RX ORDER — BLOOD-GLUCOSE METER
EACH MISCELLANEOUS
Qty: 1 KIT | Refills: 0 | Status: SHIPPED | OUTPATIENT
Start: 2025-03-16

## 2025-04-08 RX ORDER — LANCETS 33 GAUGE
EACH MISCELLANEOUS
Qty: 100 EACH | Refills: 3 | Status: SHIPPED | OUTPATIENT
Start: 2025-04-08

## 2025-04-14 DIAGNOSIS — E10.65 TYPE 1 DIABETES MELLITUS WITH HYPERGLYCEMIA (HCC): ICD-10-CM

## 2025-04-14 DIAGNOSIS — E78.5 HYPERLIPIDEMIA LDL GOAL <70: ICD-10-CM

## 2025-04-14 DIAGNOSIS — E55.9 VITAMIN D DEFICIENCY, UNSPECIFIED: ICD-10-CM

## 2025-04-14 DIAGNOSIS — I10 ESSENTIAL (PRIMARY) HYPERTENSION: ICD-10-CM

## 2025-05-20 LAB — HBA1C MFR BLD HPLC: 8 %

## 2025-05-23 ENCOUNTER — OFFICE VISIT (OUTPATIENT)
Age: 55
End: 2025-05-23

## 2025-05-23 VITALS
DIASTOLIC BLOOD PRESSURE: 64 MMHG | SYSTOLIC BLOOD PRESSURE: 132 MMHG | HEART RATE: 83 BPM | BODY MASS INDEX: 37.22 KG/M2 | WEIGHT: 218 LBS | HEIGHT: 64 IN

## 2025-05-23 DIAGNOSIS — E55.9 VITAMIN D DEFICIENCY, UNSPECIFIED: ICD-10-CM

## 2025-05-23 DIAGNOSIS — E78.5 HYPERLIPIDEMIA LDL GOAL <70: ICD-10-CM

## 2025-05-23 DIAGNOSIS — E10.65 TYPE 1 DIABETES MELLITUS WITH HYPERGLYCEMIA (HCC): Primary | ICD-10-CM

## 2025-05-23 DIAGNOSIS — I10 ESSENTIAL (PRIMARY) HYPERTENSION: ICD-10-CM

## 2025-05-23 NOTE — PATIENT INSTRUCTIONS
1) I would plan on taking 90 units of tresiba on work days and when you are getting ready to eat, I would just take 10 units of novolog for the morning and evening meals to see if this helps with spikes and prevent lows while working.    2) Your Hemoglobin A1c (3 month test of blood sugar) was 8% up from 7.4%.    3) Your creatinine (kidney test) was 1.3 up from 1.19 likely due to higher A1c.    4) ALT and AST are markers of liver function.  Your values are normal.    5) Your triglycerides (short term fats) were higher from higher A1c.    6) Your vitamin D was normal and blood pressure is controlled.

## 2025-05-23 NOTE — PROGRESS NOTES
Chief Complaint   Patient presents with    Medication Refill     No refills are needed at this time.       Diabetes    Other     Patient has given his consent for his provider to use ATLA AI during today's visit       History of Present Illness: Slade Antoine is a 54 y.o. male here for follow up of diabetes.  Weight up 2 lbs since last visit in 11/24.  Review of his dexcom data over the past 90 days shows 50% in range, 21% high, 26% very high, 3% low and <1% very low.      he has the following indications to continue treatment with Dexcom:  1) he has type 1 diabetes (E10.65) and is on an intensive insulin regimen with 4 injections per day  2) he tests his blood sugar 4 times per day and makes treatment decisions off his blood sugar readings and dexcom sensor readings  3) he requires frequent adjustments to his insulin injection doses based on his dexcom sensor readings  4) he has benefited from therapeutic continuous glucose monitoring and I recommend that he continue with this  5) he is seen in my office every 6 months          History of Present Illness  The patient is a 54-year-old male here for a follow-up of diabetes.    He acknowledges a lack of physical activity due to his work schedule and admits to not adhering strictly to his dietary guidelines. His current medication regimen includes Tresiba, with a dosage of 112 units even on working days, which he reports does not induce hypoglycemia but on looking at his data, it does causes lows. He also takes NovoLog, the dosage of which varies depending on his meal composition. He has been experiencing late-night eating habits due to his work schedule, which often results in elevated blood glucose levels. He experienced a hypoglycemic episode at work last night, during which he attempted to eat while driving. He typically consumes a small meal before starting his workday around 4 AM but did not administer NovoLog this morning due to concerns of potential

## 2025-05-28 RX ORDER — BLOOD-GLUCOSE METER
EACH MISCELLANEOUS
Qty: 1 KIT | Refills: 0 | Status: SHIPPED | OUTPATIENT
Start: 2025-05-28

## 2025-06-09 RX ORDER — INSULIN ASPART 100 [IU]/ML
INJECTION, SOLUTION INTRAVENOUS; SUBCUTANEOUS
Qty: 90 ML | Refills: 3 | Status: SHIPPED | OUTPATIENT
Start: 2025-06-09

## 2025-06-21 NOTE — PROGRESS NOTES
Hospitalist Progress Note    NAME: Miles Santos   :  1970   MRN:  [id number]       Assessment / Plan:  Acute Hypoxic Respiratory Failure POA, improving 2/2 PNA and ARDS  -B/L PNA POA  -Leucocytosis, improving  -Initially admitted with PNA, developed ? ARDS/Pulm edema  -Continue Abx   -Continues to be on 5 L NC  --On p.o. steroids  -Appreciate pulmonary help  -continue Nebs  -wean O2 as tolerated  -Awaiting sputum culture  -Respiratory PCR negative for viral panel  -Hopefully will turn around soon      Diabetes, Type II: uncontrolled in patient with hyperglycemia:   -Episodes of Hypoglycemia initially  -Lantus 60 Daily, continue  Premeal insulin  SSI    GABY: resolved  -IVF's no discontinued, monitor renal function closely while diuresing    Obesity: BMI 36.73     Code Status: Full code  Surrogate Decision Maker: Wife Jodee      DVT Prophylaxis: SQ heparin  GI Prophylaxis: not indicated     Baseline: Patient is ambulatory at baseline       Subjective:     Chief Complaint / Reason for Physician Visit: follow-up pneumonia  Patient seen for follow-up    Continues to be on 5 l NC. Feels better overall. Denies CP. Has PETERSEN. Review of Systems:  Symptom Y/N Comments  Symptom Y/N Comments   Fever/Chills    Chest Pain n    Poor Appetite    Edema n    Cough    Abdominal Pain n    Sputum    Joint Pain     SOB/PETERSEN y   Pruritis/Rash     Nausea/vomit    Tolerating PT/OT     Diarrhea    Tolerating Diet y    Constipation    Other       Could NOT obtain due to:      Objective:     VITALS:   Last 24hrs VS reviewed since prior progress note.  Most recent are:  Patient Vitals for the past 24 hrs:   Temp Pulse Resp BP SpO2   19 0822     92 %   19 0815 97.8 °F (36.6 °C) 88 22 149/70 92 %   19 0448 97.4 °F (36.3 °C) 89 20 156/75 93 %   19 0209     95 %   19 0029 97.5 °F (36.4 °C) 85 20 151/73 94 %   19 1950     92 %   19 1939 98.4 °F (36.9 °C) 92 20 Pt to CT w/ this RN and respiratory. Pt in stable condition.   140/54 96 %   12/03/19 1452 97.5 °F (36.4 °C) 92 20 153/71 90 %   12/03/19 1339     95 %   12/03/19 1302 97.7 °F (36.5 °C) 93 20 144/70 91 %       Intake/Output Summary (Last 24 hours) at 12/4/2019 1103  Last data filed at 12/3/2019 1856  Gross per 24 hour   Intake    Output 1400 ml   Net -1400 ml        PHYSICAL EXAM:  General: WD, WN. Alert, cooperative, no acute distress    EENT:  EOMI. Anicteric sclerae. MMM  Resp:  Coarse breath sounds with rales b/l, mild wheezing b/l  CV:  Regular  rhythm,  No edema  GI:  Soft, Non distended, Non tender.  +Bowel sounds  Neurologic:  Alert and oriented X 3, normal speech,   Psych:   Good insight. Not anxious nor agitated  Skin:  No rashes. No jaundice    Reviewed most current lab test results and cultures  YES  Reviewed most current radiology test results   YES  Review and summation of old records today    NO  Reviewed patient's current orders and MAR    YES  PMH/SH reviewed - no change compared to H&P  ________________________________________________________________________  Care Plan discussed with:    Comments   Patient x    Family      RN x    Care Manager x    Consultant                       x Multidiciplinary team rounds were held today with , nursing, pharmacist and clinical coordinator. Patient's plan of care was discussed; medications were reviewed and discharge planning was addressed. ________________________________________________________________________  Total NON critical care TIME:  30   Minutes    Total CRITICAL CARE TIME Spent:   Minutes non procedure based      Comments   >50% of visit spent in counseling and coordination of care     ________________________________________________________________________  Alyssa Whalen MD     Procedures: see electronic medical records for all procedures/Xrays and details which were not copied into this note but were reviewed prior to creation of Plan.       LABS:  I reviewed today's most current labs and imaging studies.   Pertinent labs include:  Recent Labs     12/04/19  0442 12/02/19  1648   WBC 17.4* 27.0*   HGB 12.5 12.5   HCT 37.5 35.9*    376     Recent Labs     12/04/19  0442 12/02/19  1319    133*   K 4.0 4.4    106   CO2 30 19*   * 178*   BUN 33* 38*   CREA 1.04 1.23   CA 8.1* 7.8*   MG 2.5*  --        Signed: Fadi Verdugo MD

## 2025-06-25 RX ORDER — BLOOD SUGAR DIAGNOSTIC
STRIP MISCELLANEOUS
Qty: 100 STRIP | Refills: 3 | Status: SHIPPED | OUTPATIENT
Start: 2025-06-25